# Patient Record
Sex: MALE | Race: WHITE | Employment: UNEMPLOYED | URBAN - METROPOLITAN AREA
[De-identification: names, ages, dates, MRNs, and addresses within clinical notes are randomized per-mention and may not be internally consistent; named-entity substitution may affect disease eponyms.]

---

## 2018-03-20 ENCOUNTER — OFFICE VISIT (OUTPATIENT)
Dept: INTERNAL MEDICINE CLINIC | Age: 63
End: 2018-03-20

## 2018-03-20 VITALS
WEIGHT: 146 LBS | HEART RATE: 68 BPM | RESPIRATION RATE: 19 BRPM | SYSTOLIC BLOOD PRESSURE: 107 MMHG | TEMPERATURE: 97.7 F | HEIGHT: 66 IN | BODY MASS INDEX: 23.46 KG/M2 | DIASTOLIC BLOOD PRESSURE: 63 MMHG | OXYGEN SATURATION: 95 %

## 2018-03-20 DIAGNOSIS — H91.93 HEARING PROBLEM OF BOTH EARS: ICD-10-CM

## 2018-03-20 DIAGNOSIS — F71 MODERATE MENTAL RETARDATION: Primary | ICD-10-CM

## 2018-03-20 DIAGNOSIS — Z76.89 ENCOUNTER TO ESTABLISH CARE WITH NEW DOCTOR: ICD-10-CM

## 2018-03-20 DIAGNOSIS — E78.5 HYPERLIPIDEMIA, UNSPECIFIED HYPERLIPIDEMIA TYPE: ICD-10-CM

## 2018-03-20 DIAGNOSIS — F81.89 DEVELOPMENTAL NON-VERBAL DISORDER: ICD-10-CM

## 2018-03-20 RX ORDER — ACETAMINOPHEN 500 MG
500 TABLET ORAL
COMMUNITY
End: 2018-09-26 | Stop reason: SDUPTHER

## 2018-03-20 RX ORDER — ALBUTEROL SULFATE 90 UG/1
2 AEROSOL, METERED RESPIRATORY (INHALATION)
COMMUNITY

## 2018-03-20 RX ORDER — AZELASTINE HYDROCHLORIDE 0.5 MG/ML
1 SOLUTION/ DROPS OPHTHALMIC 2 TIMES DAILY
COMMUNITY

## 2018-03-20 RX ORDER — FLUTICASONE PROPIONATE 110 UG/1
2 AEROSOL, METERED RESPIRATORY (INHALATION) EVERY 12 HOURS
COMMUNITY

## 2018-03-20 RX ORDER — SODIUM FLUORIDE 5 MG/G
PASTE, DENTIFRICE DENTAL
COMMUNITY
End: 2018-09-20 | Stop reason: SDUPTHER

## 2018-03-20 RX ORDER — CETIRIZINE HCL 10 MG
10 TABLET ORAL DAILY
COMMUNITY

## 2018-03-20 RX ORDER — LOPERAMIDE HYDROCHLORIDE 2 MG/1
2 CAPSULE ORAL
COMMUNITY
End: 2019-10-25 | Stop reason: SDUPTHER

## 2018-03-20 RX ORDER — BUPROPION HYDROCHLORIDE 300 MG/1
300 TABLET ORAL
COMMUNITY
End: 2018-08-27 | Stop reason: SDUPTHER

## 2018-03-20 RX ORDER — HYDROCORTISONE 25 MG/G
CREAM TOPICAL 2 TIMES DAILY
COMMUNITY
End: 2019-06-05 | Stop reason: SDUPTHER

## 2018-03-20 RX ORDER — OLANZAPINE 10 MG/1
10 TABLET ORAL
COMMUNITY
End: 2018-08-27 | Stop reason: SDUPTHER

## 2018-03-20 RX ORDER — FLUTICASONE PROPIONATE 50 MCG
2 SPRAY, SUSPENSION (ML) NASAL DAILY
COMMUNITY

## 2018-03-20 RX ORDER — MONTELUKAST SODIUM 10 MG/1
10 TABLET ORAL
COMMUNITY

## 2018-03-20 RX ORDER — ALBUTEROL SULFATE 2.5 MG/.5ML
2.5 SOLUTION RESPIRATORY (INHALATION)
COMMUNITY
End: 2019-10-25 | Stop reason: ALTCHOICE

## 2018-03-20 NOTE — MR AVS SNAPSHOT
[address] [place] [address] [address] [place] [address] 
[phone] Patient: Ashely Shetty MRN: [id number] [id number] Visit Information Date & Time Provider Department Dept. Phone Encounter #  
 3/20/2018  2:30 PM Conchis Gil MD [facility] Internal Medicine [phone] [id number] Follow-up Instructions Return for medicare and fasting blood work. Levorn Harper Upcoming Health Maintenance Date Due Hepatitis C Screening 1955 DTaP/Tdap/Td series (1 - Tdap) 2/14/1976 FOBT Q 1 YEAR AGE 50-75 2/14/2005 ZOSTER VACCINE AGE 60> 12/14/2014 Influenza Age 5 to Adult 8/1/2017 MEDICARE YEARLY EXAM 3/14/2018 Allergies as of 3/20/2018  Review Complete On: 3/20/2018 By: Conchis Gil MD  
 No Known Allergies Current Immunizations  Reviewed on 3/20/2018 No immunizations on file. Reviewed by Conchis Gil MD on 3/20/2018 at  3:04 PM  
You Were Diagnosed With   
  
 Codes Comments Moderate mental retardation    -  Primary ICD-10-CM: F71 
ICD-9-CM: 318.0 Developmental non-verbal disorder     ICD-10-CM: F81.89 ICD-9-CM: 315.2 Hyperlipidemia, unspecified hyperlipidemia type     ICD-10-CM: E78.5 ICD-9-CM: 272.4 Hearing problem of both ears     ICD-10-CM: H91.93 
ICD-9-CM: V41.2 Vitals BP Pulse Temp Resp Height(growth percentile) 107/63 (BP 1 Location: Left arm, BP Patient Position: Sitting) 68 97.7 °F (36.5 °C) (Temporal) 19 5' 6\" (1.676 m) Weight(growth percentile) SpO2 BMI Smoking Status 146 lb (66.2 kg) 95% 23.57 kg/m2 Never Smoker Vitals History BMI and BSA Data Body Mass Index Body Surface Area  
 23.57 kg/m 2 1.76 m 2 Preferred Pharmacy Pharmacy Name Phone [facility], [address] [phone] Your Updated Medication List  
  
   
This list is accurate as of 3/20/18  3:05 PM.  Always use your most recent med list.  
 acetaminophen 500 mg tablet Commonly known as:  TYLENOL Take 500 mg by mouth every six (6) hours as needed for Pain. Anti-Diarrheal (Loperamide) 2 mg capsule Generic drug:  loperamide Take 2 mg by mouth two (2) times daily as needed for Diarrhea. atorvastatin 10 mg tablet Commonly known as:  LIPITOR Take 10 mg by mouth daily (after dinner). azelastine 0.05 % ophthalmic solution Commonly known as:  OPTIVAR Administer 1 Drop to both eyes two (2) times a day. Use in affected eye(s) buPROPion  mg XL tablet Commonly known as:  Tod Mutters Take 300 mg by mouth every morning. DENTA 5000 PLUS 1.1 % Crea Generic drug:  fluoride (sodium) by Dental route every morning. finasteride 5 mg tablet Commonly known as:  PROSCAR Take 5 mg by mouth daily. FISH OIL 1,000 mg Cap Generic drug:  omega-3 fatty acids-vitamin e Take 1 Cap by mouth three (3) times daily. FLUoxetine 20 mg capsule Commonly known as:  PROzac Take 20 mg by mouth daily. * fluticasone 50 mcg/actuation nasal spray Commonly known as:  Robinson Lover 2 Sprays by Both Nostrils route daily. * FLOVENT  mcg/actuation inhaler Generic drug:  fluticasone Take 2 Puffs by inhalation every twelve (12) hours. hydrocortisone 2.5 % topical cream  
Commonly known as:  HYTONE Apply  to affected area two (2) times a day. use thin layer NEUTROGENA SENSITIVE SKN MOIST lotion Generic drug:  glycerin-dimethicone Apply  to affected area daily. * OLANZapine 10 mg tablet Commonly known as:  ZyPREXA Take 10 mg by mouth nightly. * ZyPREXA 10 mg tablet Generic drug:  OLANZapine Take 10 mg by mouth nightly. omeprazole 20 mg capsule Commonly known as:  PRILOSEC Take 20 mg by mouth daily. SINGULAIR 10 mg tablet Generic drug:  montelukast  
Take 10 mg by mouth nightly. THERA tablet Generic drug:  therapeutic multivitamin Take 1 Tab by mouth daily. * VENTOLIN HFA 90 mcg/actuation inhaler Generic drug:  albuterol Take 2 Puffs by inhalation every four (4) hours as needed for Wheezing. * albuterol sulfate 2.5 mg/0.5 mL Nebu nebulizer solution Commonly known as:  PROVENTIL;VENTOLIN  
2.5 mg by Nebulization route every four (4) hours as needed for Wheezing. ZyrTEC 10 mg tablet Generic drug:  cetirizine Take 10 mg by mouth daily. * Notice: This list has 6 medication(s) that are the same as other medications prescribed for you. Read the directions carefully, and ask your doctor or other care provider to review them with you. Follow-up Instructions Return for medicare and fasting blood work. Leonardo Orozco To-Do List   
 03/27/2018 12:15 PM  
  Appointment with [facility] MRI 2 at [facility] ([phone]) 1. Please bring a list or a bag of your current medications to your appointment 2. Please be sure to remove ALL hair clips, pins, extensions, etc., prior to arriving for your MRI procedure. 3. If you have any medical implants or devices, please bring associated medical card with you. 4. Bring any non [facility] films or CDs pertaining to the area being imaged with you on the day of appointment. 5. A written order with a valid diagnosis and Physicians  signature is required for all scheduled tests. 6. Check in at registration 30min before your appointment time unless you were instructed to do otherwise. Introducing [facility]! Kylie Simpson introduces [employer] patient portal. Now you can access parts of your medical record, email your doctor's office, and request medication refills online. 1. In your internet browser, go to [email or web address] 2. Click on the First Time User? Click Here link in the Sign In box. You will see the New Member Sign Up page. 3. Enter your [employer] Access Code exactly as it appears below. You will not need to use this code after youve completed the sign-up process. If you do not sign up before the expiration date, you must request a new code. · [employer] Access Code: [email or web address] Expires: 5/30/2018  1:50 PM 
 
4. Enter the last four digits of your Social Security Number (xxxx) and Date of Birth (mm/dd/yyyy) as indicated and click Submit. You will be taken to the next sign-up page. 5. Create a [employer] ID. This will be your [employer] login ID and cannot be changed, so think of one that is secure and easy to remember. 6. Create a [employer] password. You can change your password at any time. 7. Enter your Password Reset Question and Answer. This can be used at a later time if you forget your password. 8. Enter your e-mail address. You will receive e-mail notification when new information is available in [address]. 9. Click Sign Up. You can now view and download portions of your medical record. 10. Click the Download Summary menu link to download a portable copy of your medical information. If you have questions, please visit the Frequently Asked Questions section of the [employer] website. Remember, [employer] is NOT to be used for urgent needs. For medical emergencies, dial 911. Now available from your iPhone and Android! Please provide this summary of care documentation to your next provider. Your primary care clinician is listed as EDUIN Laguerre . If you have any questions after today's visit, please call [phone].

## 2018-03-20 NOTE — PROGRESS NOTES
Subjective:     Chief Complaint   Patient presents with   [facility].        He  is a 61y.o. year old male who presents with his group home staff member as a new patient to establish care. His past medical history is significant for moderate mental retardation, non verbal, HLD. 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 He has an appointment for MRI for his sudden loss of hearing. All history is gathered from the caregiver from group [place]. No other concerns toady. Doing well. .     Review of systems not obtained due to patient factors. Objective:     Vitals:    03/20/18 1436   BP: 107/63   Pulse: 68   Resp: 19   Temp: 97.7 °F (36.5 °C)   TempSrc: Temporal   SpO2: 95%   Weight: 146 lb (66.2 kg)   Height: 5' 6\" (1.676 m)       Physical Examination: General appearance - alert, well appearing, and in no distress, normal weight. Mental status - alert,  normal mood, behavior,  dress, motor activity, non verbal.  Patient was following my verbal direction.  Does not seems like he has any hearing difficulty during exam.  Ears - bilateral TM's and external ear canals normal  Nose - normal and patent, no erythema, discharge or polyps  Mouth - mucous membranes moist, pharynx normal without lesions  Neck - supple, no significant adenopathy  Chest - clear to auscultation, no wheezes, rales or rhonchi, symmetric air entry  Heart - normal rate, regular rhythm, normal S1, S2, no murmurs, rubs, clicks or gallops  Abdomen - soft,  nondistended, no masses or organomegaly  Extremities - peripheral pulses normal, no pedal edema, no clubbing or cyanosis    No Known Allergies   Social History     Social History    Marital status: UNKNOWN     Spouse name: N/A    Number of children: N/A    Years of education: N/A     Social History Main Topics    Smoking status: Never Smoker    Smokeless tobacco: Never Used    Alcohol use No    Drug use: No    Sexual activity: Not Asked     Other Topics Concern    None     Social History Narrative      No family history on file. Past Surgical History:   Procedure Laterality Date    COLONOSCOPY,DIAGNOSTIC  11/25/2014         [place] EGD TRANSORAL BIOPSY SINGLE/MULTIPLE  8/1/2013           Past Medical History:   Diagnosis Date    Other ill-defined conditions(799.89)     nonverbal,uses sign language some,mental retardation    Other ill-defined conditions(799.89)     elevated cholesterol    Psychiatric disorder     MR,depression anxiety      Current Outpatient Prescriptions   Medication Sig Dispense Refill    albuterol (VENTOLIN HFA) 90 mcg/actuation inhaler Take 2 Puffs by inhalation every four (4) hours as needed for Wheezing.  hydrocortisone (HYTONE) 2.5 % topical cream Apply  to affected area two (2) times a day. use thin layer      loperamide (ANTI-DIARRHEAL, LOPERAMIDE,) 2 mg capsule Take 2 mg by mouth two (2) times daily as needed for Diarrhea.  acetaminophen (TYLENOL) 500 mg tablet Take 500 mg by mouth every six (6) hours as needed for Pain.  albuterol sulfate (PROVENTIL;VENTOLIN) 2.5 mg/0.5 mL nebu nebulizer solution 2.5 mg by Nebulization route every four (4) hours as needed for Wheezing.  buPROPion XL (WELLBUTRIN XL) 300 mg XL tablet Take 300 mg by mouth every morning.  cetirizine (ZYRTEC) 10 mg tablet Take 10 mg by mouth daily.  fluticasone (FLONASE) 50 mcg/actuation nasal spray 2 Sprays by Both Nostrils route daily.  glycerin-dimethicone (NEUTROGENA SENSITIVE SKN MOIST) lotion Apply  to affected area daily.  azelastine (OPTIVAR) 0.05 % ophthalmic solution Administer 1 Drop to both eyes two (2) times a day. Use in affected eye(s)      fluticasone (FLOVENT HFA) 110 mcg/actuation inhaler Take 2 Puffs by inhalation every twelve (12) hours.  fluoride, sodium, (DENTA 5000 PLUS) 1.1 % crea by Dental route every morning.  OLANZapine (ZYPREXA) 10 mg tablet Take 10 mg by mouth nightly.       montelukast (SINGULAIR) 10 mg tablet Take 10 mg by mouth nightly.  omeprazole (PRILOSEC) 20 mg capsule Take 20 mg by mouth daily.  finasteride (PROSCAR) 5 mg tablet Take 5 mg by mouth daily.  FLUoxetine (PROZAC) 20 mg capsule Take 20 mg by mouth daily.  therapeutic multivitamin (THERA) tablet Take 1 Tab by mouth daily.  atorvastatin (LIPITOR) 10 mg tablet Take 10 mg by mouth daily (after dinner).  omega-3 fatty acids-vitamin e (FISH OIL) 1,000 mg cap Take 1 Cap by mouth three (3) times daily.  OLANZapine (ZYPREXA) 10 mg tablet Take 10 mg by mouth nightly. Assessment/ Plan:   Diagnoses and all orders for this visit:    1. Moderate mental retardation      - stable. Continue conservative management. 2. Developmental non-verbal disorder     - stable. 3. Hyperlipidemia, unspecified hyperlipidemia type       - continue lipittor 10 mg. Will do lab in next visit. 4. Hearing problem of both ears       - he was following my verbal direction in office today. Continue follow up with ENT. 5. Encounter to establish care with new doctor           Medication risks/benefits/costs/interactions/alternatives discussed with patient. Advised patient to call back or return to office if symptoms worsen/change/persist. If patient cannot reach us or should anything more severe/urgent arise he/she should proceed directly to the nearest emergency department. Discussed expected course/resolution/complications of diagnosis in detail with patient. Patient given a written after visit summary which includes her diagnoses, current medications and vitals. Patient expressed understanding with the diagnosis and plan. Follow-up Disposition:  Return for medicare and fasting blood work. Charisse Hopper

## 2018-03-26 RX ORDER — GLUCOSAM/CHONDRO/HERB 149/HYAL 750-100 MG
1 TABLET ORAL 3 TIMES DAILY
Qty: 90 CAP | Refills: 1 | Status: SHIPPED | OUTPATIENT
Start: 2018-03-26 | End: 2019-10-25 | Stop reason: ALTCHOICE

## 2018-03-27 ENCOUNTER — HOSPITAL ENCOUNTER (OUTPATIENT)
Dept: MRI IMAGING | Age: 63
Discharge: HOME | End: 2018-03-27
Attending: SPECIALIST
Payer: MEDICARE

## 2018-03-27 DIAGNOSIS — H90.3 SENSORINEURAL HEARING LOSS, BILATERAL: ICD-10-CM

## 2018-03-27 PROCEDURE — A9576 INJ PROHANCE MULTIPACK: HCPCS | Performed by: SPECIALIST

## 2018-03-27 PROCEDURE — 74011250636 HC RX REV CODE- 250/636: Performed by: SPECIALIST

## 2018-03-27 PROCEDURE — 70553 MRI BRAIN STEM W/O & W/DYE: CPT

## 2018-03-27 RX ADMIN — GADOTERIDOL 15 ML: 279.3 INJECTION, SOLUTION INTRAVENOUS at 13:19

## 2018-03-30 ENCOUNTER — LAB ONLY (OUTPATIENT)
Dept: INTERNAL MEDICINE CLINIC | Age: 63
End: 2018-03-30

## 2018-05-11 ENCOUNTER — OFFICE VISIT (OUTPATIENT)
Dept: INTERNAL MEDICINE CLINIC | Age: 63
End: 2018-05-11

## 2018-05-17 ENCOUNTER — HOSPITAL ENCOUNTER (OUTPATIENT)
Dept: CT IMAGING | Age: 63
Discharge: HOME | End: 2018-05-17
Attending: SPECIALIST
Payer: MEDICARE

## 2018-05-17 DIAGNOSIS — D33.3 BENIGN NEOPLASM OF CRANIAL NERVES (HCC): ICD-10-CM

## 2018-05-17 DIAGNOSIS — D18.09 HEMANGIOMA OF OTHER SITES: ICD-10-CM

## 2018-05-17 DIAGNOSIS — H90.3 SENSORINEURAL HEARING LOSS, BILATERAL: ICD-10-CM

## 2018-05-17 PROCEDURE — 74011636320 HC RX REV CODE- 636/320: Performed by: SPECIALIST

## 2018-05-17 PROCEDURE — 70481 CT ORBIT/EAR/FOSSA W/DYE: CPT

## 2018-05-17 PROCEDURE — 74011000258 HC RX REV CODE- 258: Performed by: SPECIALIST

## 2018-05-17 RX ORDER — SODIUM CHLORIDE 0.9 % (FLUSH) 0.9 %
10 SYRINGE (ML) INJECTION
Status: COMPLETED | OUTPATIENT
Start: 2018-05-17 | End: 2018-05-17

## 2018-05-17 RX ADMIN — IOPAMIDOL 100 ML: 612 INJECTION, SOLUTION INTRAVENOUS at 10:00

## 2018-05-17 RX ADMIN — Medication 10 ML: at 10:00

## 2018-05-17 RX ADMIN — SODIUM CHLORIDE 100 ML: 900 INJECTION, SOLUTION INTRAVENOUS at 10:00

## 2018-05-24 ENCOUNTER — OFFICE VISIT (OUTPATIENT)
Dept: INTERNAL MEDICINE CLINIC | Age: 63
End: 2018-05-24

## 2018-05-24 ENCOUNTER — HOSPITAL ENCOUNTER (OUTPATIENT)
Dept: LAB | Age: 63
Discharge: HOME | End: 2018-05-24
Payer: MEDICARE

## 2018-05-24 VITALS
HEIGHT: 66 IN | BODY MASS INDEX: 23.27 KG/M2 | RESPIRATION RATE: 18 BRPM | SYSTOLIC BLOOD PRESSURE: 135 MMHG | TEMPERATURE: 96.2 F | HEART RATE: 70 BPM | OXYGEN SATURATION: 99 % | WEIGHT: 144.8 LBS | DIASTOLIC BLOOD PRESSURE: 70 MMHG

## 2018-05-24 DIAGNOSIS — Z11.1 SCREENING-PULMONARY TB: ICD-10-CM

## 2018-05-24 DIAGNOSIS — E78.5 HYPERLIPIDEMIA, UNSPECIFIED HYPERLIPIDEMIA TYPE: ICD-10-CM

## 2018-05-24 DIAGNOSIS — F71 MODERATE MENTAL RETARDATION: ICD-10-CM

## 2018-05-24 DIAGNOSIS — Z12.11 SCREEN FOR COLON CANCER: ICD-10-CM

## 2018-05-24 DIAGNOSIS — F81.89 DEVELOPMENTAL NON-VERBAL DISORDER: ICD-10-CM

## 2018-05-24 DIAGNOSIS — Z12.5 SPECIAL SCREENING FOR MALIGNANT NEOPLASM OF PROSTATE: ICD-10-CM

## 2018-05-24 DIAGNOSIS — Z00.00 MEDICARE ANNUAL WELLNESS VISIT, SUBSEQUENT: Primary | ICD-10-CM

## 2018-05-24 PROCEDURE — 86480 TB TEST CELL IMMUN MEASURE: CPT

## 2018-05-24 PROCEDURE — 80053 COMPREHEN METABOLIC PANEL: CPT

## 2018-05-24 PROCEDURE — 86803 HEPATITIS C AB TEST: CPT

## 2018-05-24 PROCEDURE — 80061 LIPID PANEL: CPT

## 2018-05-24 PROCEDURE — 85025 COMPLETE CBC W/AUTO DIFF WBC: CPT

## 2018-05-24 PROCEDURE — 84443 ASSAY THYROID STIM HORMONE: CPT

## 2018-05-24 PROCEDURE — 83036 HEMOGLOBIN GLYCOSYLATED A1C: CPT

## 2018-05-24 NOTE — MR AVS SNAPSHOT
[address] [place] [address] Shea Mena 26 350 [facility] 
[phone] Patient: Raymond Fong MRN: [id number] KXP:[id number] Visit Information Date & Time Provider Department Dept. Phone Encounter #  
 5/24/2018 10:45 AM Duke Chairez MD [facility] Internal Medicine [phone] [id number] Follow-up Instructions Return in about 6 months (around 11/24/2018), or if symptoms worsen or fail to improve. Your Appointments 8/27/2018 10:00 AM  
New Patient with Louann Ritchie MD  
[facility] [address] [facility]) Appt Note: NP moderate mental retardation [facility] [address] [facility]ly [facility] [address]  
  
   
 [address] [place] [address] [address] [phone] Upcoming Health Maintenance Date Due Hepatitis C Screening 1955 DTaP/Tdap/Td series (1 - Tdap) 2/14/1976 FOBT Q 1 YEAR AGE 50-75 2/14/2005 ZOSTER VACCINE AGE 60> 12/14/2014 MEDICARE YEARLY EXAM 3/14/2018 Influenza Age 5 to Adult 8/1/2018 Allergies as of 5/24/2018  Review Complete On: 5/24/2018 By: Anupama Dillon MD  
 No Known Allergies Current Immunizations  Reviewed on 5/24/2018 No immunizations on file. Reviewed by Anupama Dillon MD on 5/24/2018 at 11:08 AM  
 Reviewed by Anupama Dillon MD on 5/24/2018 at 11:16 AM  
You Were Diagnosed With   
  
 Codes Comments Medicare annual wellness visit, subsequent    -  Primary ICD-10-CM: Z00.00 ICD-9-CM: V70.0 Moderate mental retardation     ICD-10-CM: F71 
ICD-9-CM: 318.0 Hyperlipidemia, unspecified hyperlipidemia type     ICD-10-CM: E78.5 ICD-9-CM: 272.4 Special screening for malignant neoplasm of prostate     ICD-10-CM: Z12.5 ICD-9-CM: V76.44 Developmental non-verbal disorder     ICD-10-CM: F81.89 ICD-9-CM: 315.2 Screening-pulmonary TB     ICD-10-CM: Z11.1 ICD-9-CM: V74.1 Screen for colon cancer     ICD-10-CM: Z12.11 ICD-9-CM: V76.51   
 Vitals BP Pulse Temp Resp Height(growth percentile) 135/70 (BP 1 Location: Left arm, BP Patient Position: Sitting) 70 96.2 °F (35.7 °C) (Temporal) 18 5' 6\" (1.676 m) Weight(growth percentile) SpO2 BMI Smoking Status 144 lb 12.8 oz (65.7 kg) 99% 23.37 kg/m2 Never Smoker Vitals History BMI and BSA Data Body Mass Index Body Surface Area  
 23.37 kg/m 2 1.75 m 2 Preferred Pharmacy Pharmacy Name Phone [facility], [place] [address] [phone] Your Updated Medication List  
  
   
This list is accurate as of 5/24/18 11:17 AM.  Always use your most recent med list.  
  
  
  
  
 acetaminophen 500 mg tablet Commonly known as:  TYLENOL Take 500 mg by mouth every six (6) hours as needed for Pain. Anti-Diarrheal (Loperamide) 2 mg capsule Generic drug:  loperamide Take 2 mg by mouth two (2) times daily as needed for Diarrhea. atorvastatin 10 mg tablet Commonly known as:  LIPITOR  
TAKE 1 TABLET BY MOUTH ONCE DAILY IN THE EVENING  
  
 azelastine 0.05 % ophthalmic solution Commonly known as:  OPTIVAR Administer 1 Drop to both eyes two (2) times a day. Use in affected eye(s) buPROPion  mg XL tablet Commonly known as:  Gavino Mon Take 300 mg by mouth every morning. DENTA 5000 PLUS 1.1 % Crea Generic drug:  fluoride (sodium) by Dental route every morning. finasteride 5 mg tablet Commonly known as:  PROSCAR  
TAKE 1 TABLET DAILY *SPECIAL HANDLING OF TABLETS* FISH OIL 1,000 mg Cap Generic drug:  omega-3 fatty acids-vitamin e Take 1 Cap by mouth three (3) times daily. FISH -1,000 mg capsule Generic drug:  fish oil-omega-3 fatty acids TAKE (1) CAPSULE BY MOUTH THREE TIMES DAILY. FLUoxetine 20 mg capsule Commonly known as:  PROzac Take 20 mg by mouth daily. * fluticasone 50 mcg/actuation nasal spray Commonly known as:  Nain Vargas 2 Sprays by Both Nostrils route daily. * FLOVENT  mcg/actuation inhaler Generic drug:  fluticasone Take 2 Puffs by inhalation every twelve (12) hours. hydrocortisone 2.5 % topical cream  
Commonly known as:  HYTONE Apply  to affected area two (2) times a day. use thin layer NEUTROGENA SENSITIVE SKN MOIST lotion Generic drug:  glycerin-dimethicone Apply  to affected area daily. * OLANZapine 10 mg tablet Commonly known as:  ZyPREXA Take 10 mg by mouth nightly. * ZyPREXA 10 mg tablet Generic drug:  OLANZapine Take 10 mg by mouth nightly. omega 3-DHA-EPA-fish oil 1,000 mg (120 mg-180 mg) capsule Commonly known as:  FISH OIL Take 1 Cap by mouth three (3) times daily. omeprazole 20 mg capsule Commonly known as:  PRILOSEC  
TAKE (1) CAPSULE DAILY 30-60 MINUTES BEFORE BREAKFAST. SINGULAIR 10 mg tablet Generic drug:  montelukast  
Take 10 mg by mouth nightly. therapeutic multivitamin tablet Commonly known as:  THERAGRAN  
TAKE 1 TABLET BY MOUTH DAILY * VENTOLIN HFA 90 mcg/actuation inhaler Generic drug:  albuterol Take 2 Puffs by inhalation every four (4) hours as needed for Wheezing. * albuterol sulfate 2.5 mg/0.5 mL Nebu nebulizer solution Commonly known as:  PROVENTIL;VENTOLIN  
2.5 mg by Nebulization route every four (4) hours as needed for Wheezing. ZyrTEC 10 mg tablet Generic drug:  cetirizine Take 10 mg by mouth daily. * Notice: This list has 6 medication(s) that are the same as other medications prescribed for you. Read the directions carefully, and ask your doctor or other care provider to review them with you. We Performed the Following CBC WITH AUTOMATED DIFF [90135 CPT(R)] COLOGUARD TEST (FECAL DNA COLORECTAL CANCER SCREENING) [56458 CPT(R)] HEMOGLOBIN A1C WITH EAG [37514 CPT(R)] HEPATITIS C AB [63506 CPT(R)] LIPID PANEL [35304 CPT(R)] METABOLIC PANEL, COMPREHENSIVE [55701 CPT(R)] QUANTIFERON TB GOLD [[id number] Custom] TSH AND FREE T4 [34851 CPT(R)] Follow-up Instructions Return in about 6 months (around 11/24/2018), or if symptoms worsen or fail to improve. To-Do List   
 05/24/2018 Lab:  PSA SCREENING (SCREENING) Patient Instructions Medicare Wellness Visit, Male The best way to live healthy is to have a lifestyle where you eat a well-balanced diet, exercise regularly, limit alcohol use, and quit all forms of tobacco/nicotine, if applicable. Regular preventive services are another way to keep healthy. Preventive services (vaccines, screening tests, monitoring & exams) can help personalize your care plan, which helps you manage your own care. Screening tests can find health problems at the earliest stages, when they are easiest to treat. [address] follows the current, evidence-based guidelines published by the [facility] Russ Prince ([facility]) when recommending preventive services for our patients. Because we follow these guidelines, sometimes recommendations change over time as research supports it. (For example, a prostate screening blood test is no longer routinely recommended for men with no symptoms.) Of course, you and your provider may decide to screen more often for some diseases, based on your risk and co-morbidities (chronic disease you are already diagnosed with). Preventive services for you include: - Medicare offers their members a free annual wellness visit, which is time for you and your primary care provider to discuss and plan for your preventive service needs. Take advantage of this benefit every year! 
 
-All people over age 72 should receive the recommended pneumonia vaccines. Current USPSTF guidelines recommend a series of two vaccines for the best pneumonia protection. -All adults should have a yearly flu vaccine and a tetanus vaccine every 10 years. All adults age 61 years should receive a shingles vaccine once in their lifetime.   
 
-All adults age 38-68 years who are overweight should have a diabetes screening test once every three years.  
 
-Other screening tests & preventive services for persons with diabetes include: an eye exam to screen for diabetic retinopathy, a kidney function test, a foot exam, and stricter control over your cholesterol.  
 
-Cardiovascular screening for adults with routine risk involves an electrocardiogram (ECG) at intervals determined by the provider.  
 
-Colorectal cancer screenings should be done for adults age 54-65 years with normal risk. There are a number of acceptable methods of screening for this type of cancer. Each test has its own benefits and drawbacks. Discuss with your provider what is most appropriate for you during your annual wellness visit. The different tests include: colonoscopy (considered the best screening method), a fecal occult blood test, a fecal DNA test, and sigmoidoscopy. 
 
-All adults born between [facility] should be screened once for Hepatitis C. 
 
-An Abdominal Aortic Aneurysm (AAA) Screening is recommended for men age 73-68 who has ever smoked in their lifetime. Here is a list of your current Health Maintenance items (your personalized list of preventive services) with a due date: 
Health Maintenance Due Topic Date Due  
 Hepatitis C Test  1955  DTaP/Tdap/Td  (1 - Tdap) 02/14/1976  Stool testing for trace blood  02/14/2005  Shingles Vaccine  12/14/2014 [facility] Annual Well Visit  03/14/2018 Well Visit, Men 48 to 72: Care Instructions Your Care Instructions Physical exams can help you stay healthy. Your doctor has checked your overall health and may have suggested ways to take good care of yourself. He or she also may have recommended tests.  At home, you can help prevent illness with healthy eating, regular exercise, and other steps. Follow-up care is a key part of your treatment and safety. Be sure to make and go to all appointments, and call your doctor if you are having problems. It's also a good idea to know your test results and keep a list of the medicines you take. How can you care for yourself at home? · Reach and stay at a healthy weight. This will lower your risk for many problems, such as obesity, diabetes, heart disease, and high blood pressure. · Get at least 30 minutes of exercise on most days of the week. Walking is a good choice. You also may want to do other activities, such as running, swimming, cycling, or playing tennis or team sports. · Do not smoke. Smoking can make health problems worse. If you need help quitting, talk to your doctor about stop-smoking programs and medicines. These can increase your chances of quitting for good. · Protect your skin from too much sun. When you're outdoors from 10 a.m. to 4 p.m., stay in the shade or cover up with clothing and a hat with a wide brim. Wear sunglasses that block UV rays. Even when it's cloudy, put broad-spectrum sunscreen (SPF 30 or higher) on any exposed skin. · See a dentist one or two times a year for checkups and to have your teeth cleaned. · Wear a seat belt in the car. · Limit alcohol to 2 drinks a day. Too much alcohol can cause health problems. Follow your doctor's advice about when to have certain tests. These tests can spot problems early. · Cholesterol. Your doctor will tell you how often to have this done based on your overall health and other things that can increase your risk for heart attack and stroke. · Blood pressure. Have your blood pressure checked during a routine doctor visit. Your doctor will tell you how often to check your blood pressure based on your age, your blood pressure results, and other factors.  
· Prostate exam. Talk to your doctor about whether you should have a blood test (called a PSA test) for prostate cancer. Experts disagree on whether men should have this test. Some experts recommend that you discuss the benefits and risks of the test with your doctor. · Diabetes. Ask your doctor whether you should have tests for diabetes. · Vision. Some experts recommend that you have yearly exams for glaucoma and other age-related eye problems starting at age 48. · Hearing. Tell your doctor if you notice any change in your hearing. You can have tests to find out how well you hear. · Colon cancer. You should begin tests for colon cancer at age 48. You may have one of several tests. Your doctor will tell you how often to have tests based on your age and risk. Risks include whether you already had a precancerous polyp removed from your colon or whether your parent, brother, sister, or child has had colon cancer. · Heart attack and stroke risk. At least every 4 to 6 years, you should have your risk for heart attack and stroke assessed. Your doctor uses factors such as your age, blood pressure, cholesterol, and whether you smoke or have diabetes to show what your risk for a heart attack or stroke is over the next 10 years. · Abdominal aortic aneurysm. Ask your doctor whether you should have a test to check for an aneurysm. You may need a test if you ever smoked or if your parent, brother, sister, or child has had an aneurysm. When should you call for help? Watch closely for changes in your health, and be sure to contact your doctor if you have any problems or symptoms that concern you. Where can you learn more? Go to http://niyah-corine.info/. Enter V786 in the search box to learn more about \"Well Visit, Men 48 to 72: Care Instructions. \" Current as of: May 12, 2017 Content Version: 11.4 © 2549-3590 [employer].  Care instructions adapted under license by [employer] (which disclaims liability or warranty for this information). If you have questions about a medical condition or this instruction, always ask your healthcare professional. [address] any warranty or liability for your use of this information. Introducing [facility] SERVICES! [employer] introduces [employer] patient portal. Now you can access parts of your medical record, email your doctor's office, and request medication refills online. 1. In your internet browser, go to [email or web address] 2. Click on the First Time User? Click Here link in the Sign In box. You will see the New Member Sign Up page. 3. Enter your [employer] Access Code exactly as it appears below. You will not need to use this code after youve completed the sign-up process. If you do not sign up before the expiration date, you must request a new code. · [employer] Access Code: [email or web address] Expires: 5/30/2018  1:50 PM 
 
4. Enter the last four digits of your Social Security Number (xxxx) and Date of Birth (mm/dd/yyyy) as indicated and click Submit. You will be taken to the next sign-up page. 5. Create a [employer] ID. This will be your [employer] login ID and cannot be changed, so think of one that is secure and easy to remember. 6. Create a [employer] password. You can change your password at any time. 7. Enter your Password Reset Question and Answer. This can be used at a later time if you forget your password. 8. Enter your e-mail address. You will receive e-mail notification when new information is available in [address] 9. Click Sign Up. You can now view and download portions of your medical record. 10. Click the Download Summary menu link to download a portable copy of your medical information. If you have questions, please visit the Frequently Asked Questions section of the [employer] website. Remember, [employer] is NOT to be used for urgent needs. For medical emergencies, dial 911. Now available from your iPhone and Android! Please provide this summary of care documentation to your next provider. Your primary care clinician is listed as Duke QIU Asad. If you have any questions after today's visit, please call [phone].

## 2018-05-24 NOTE — PATIENT INSTRUCTIONS
Medicare Wellness Visit, Male    The best way to live healthy is to have a lifestyle where you eat a well-balanced diet, exercise regularly, limit alcohol use, and quit all forms of tobacco/nicotine, if applicable. Regular preventive services are another way to keep healthy. Preventive services (vaccines, screening tests, monitoring & exams) can help personalize your care plan, which helps you manage your own care. Screening tests can find health problems at the earliest stages, when they are easiest to treat. [address] follows the current, evidence-based guidelines published by the [facility] Russ Prince ([facility]) when recommending preventive services for our patients. Because we follow these guidelines, sometimes recommendations change over time as research supports it. (For example, a prostate screening blood test is no longer routinely recommended for men with no symptoms.)    Of course, you and your provider may decide to screen more often for some diseases, based on your risk and co-morbidities (chronic disease you are already diagnosed with). Preventive services for you include:    - Medicare offers their members a free annual wellness visit, which is time for you and your primary care provider to discuss and plan for your preventive service needs. Take advantage of this benefit every year!    -All people over age 72 should receive the recommended pneumonia vaccines. Current USPSTF guidelines recommend a series of two vaccines for the best pneumonia protection.     -All adults should have a yearly flu vaccine and a tetanus vaccine every 10 years.  All adults age 61 years should receive a shingles vaccine once in their lifetime.      -All adults age 38-68 years who are overweight should have a diabetes screening test once every three years.     -Other screening tests & preventive services for persons with diabetes include: an eye exam to screen for diabetic retinopathy, a kidney function test, a foot exam, and stricter control over your cholesterol.     -Cardiovascular screening for adults with routine risk involves an electrocardiogram (ECG) at intervals determined by the provider.     -Colorectal cancer screenings should be done for adults age 54-65 years with normal risk. There are a number of acceptable methods of screening for this type of cancer. Each test has its own benefits and drawbacks. Discuss with your provider what is most appropriate for you during your annual wellness visit. The different tests include: colonoscopy (considered the best screening method), a fecal occult blood test, a fecal DNA test, and sigmoidoscopy.    -All adults born between [facility] should be screened once for Hepatitis C.    -An Abdominal Aortic Aneurysm (AAA) Screening is recommended for men age 73-68 who has ever smoked in their lifetime. Here is a list of your current Health Maintenance items (your personalized list of preventive services) with a due date:  Health Maintenance Due   Topic Date Due    Hepatitis C Test  1955    DTaP/Tdap/Td  (1 - Tdap) 02/14/1976    Stool testing for trace blood  02/14/2005    Shingles Vaccine  12/14/2014    Annual Well Visit  03/14/2018        Well Visit, Men 48 to 72: Care Instructions  Your Care Instructions    Physical exams can help you stay healthy. Your doctor has checked your overall health and may have suggested ways to take good care of yourself. He or she also may have recommended tests. At home, you can help prevent illness with healthy eating, regular exercise, and other steps. Follow-up care is a key part of your treatment and safety. Be sure to make and go to all appointments, and call your doctor if you are having problems. It's also a good idea to know your test results and keep a list of the medicines you take. How can you care for yourself at home? · Reach and stay at a healthy weight.  This will lower your risk for many problems, such as obesity, diabetes, heart disease, and high blood pressure. · Get at least 30 minutes of exercise on most days of the week. Walking is a good choice. You also may want to do other activities, such as running, swimming, cycling, or playing tennis or team sports. · Do not smoke. Smoking can make health problems worse. If you need help quitting, talk to your doctor about stop-smoking programs and medicines. These can increase your chances of quitting for good. · Protect your skin from too much sun. When you're outdoors from 10 a.m. to 4 p.m., stay in the shade or cover up with clothing and a hat with a wide brim. Wear sunglasses that block UV rays. Even when it's cloudy, put broad-spectrum sunscreen (SPF 30 or higher) on any exposed skin. · See a dentist one or two times a year for checkups and to have your teeth cleaned. · Wear a seat belt in the car. · Limit alcohol to 2 drinks a day. Too much alcohol can cause health problems. Follow your doctor's advice about when to have certain tests. These tests can spot problems early. · Cholesterol. Your doctor will tell you how often to have this done based on your overall health and other things that can increase your risk for heart attack and stroke. · Blood pressure. Have your blood pressure checked during a routine doctor visit. Your doctor will tell you how often to check your blood pressure based on your age, your blood pressure results, and other factors. · Prostate exam. Talk to your doctor about whether you should have a blood test (called a PSA test) for prostate cancer. Experts disagree on whether men should have this test. Some experts recommend that you discuss the benefits and risks of the test with your doctor. · Diabetes. Ask your doctor whether you should have tests for diabetes. · Vision. Some experts recommend that you have yearly exams for glaucoma and other age-related eye problems starting at age 48. · Hearing. Tell your doctor if you notice any change in your hearing. You can have tests to find out how well you hear. · Colon cancer. You should begin tests for colon cancer at age 48. You may have one of several tests. Your doctor will tell you how often to have tests based on your age and risk. Risks include whether you already had a precancerous polyp removed from your colon or whether your parent, brother, sister, or child has had colon cancer. · Heart attack and stroke risk. At least every 4 to 6 years, you should have your risk for heart attack and stroke assessed. Your doctor uses factors such as your age, blood pressure, cholesterol, and whether you smoke or have diabetes to show what your risk for a heart attack or stroke is over the next 10 years. · Abdominal aortic aneurysm. Ask your doctor whether you should have a test to check for an aneurysm. You may need a test if you ever smoked or if your parent, brother, sister, or child has had an aneurysm. When should you call for help? Watch closely for changes in your health, and be sure to contact your doctor if you have any problems or symptoms that concern you. Where can you learn more? Go to http://niyah-corine.info/. Enter J536 in the search box to learn more about \"Well Visit, Men 48 to 72: Care Instructions. \"  Current as of: May 12, 2017  Content Version: 11.4  © 1737-7688 [employer]. Care instructions adapted under license by [employer] (which disclaims liability or warranty for this information). If you have questions about a medical condition or this instruction, always ask your healthcare professional. [place] [address] any warranty or liability for your use of this information.

## 2018-05-24 NOTE — PROGRESS NOTES
This is the Subsequent Medicare Annual Wellness Exam, performed 12 months or more after the Initial AWV or the last Subsequent AWV    I have reviewed the patient's medical history in detail and updated the computerized patient record. He  is a 61y.o. year old male who presents with his group home staff member . His past medical history is significant for moderate mental retardation, non verbal, HLD, urinary incontinence. He is taking Lipitor 10 mg daily. Has been taking Finasteride. 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 Had MRI done in 3/3018 by ENT specialist. Wanted to know the result in detail. I reviewed the result with caregiver. Dicussed that there are age related change in his brain otherwise normal     MRI:   IMPRESSION   impression: Mild atrophy and periventricular white matter disease. No other  significant findings with special attention to the temporal bones.     All history is gathered from the caregiver from [facility].      No other concerns toady. Doing well. .      Review of systems not obtained due to patient factors.       History     Past Medical History:   Diagnosis Date    Other ill-defined conditions(799.89)     nonverbal,uses sign language some,mental retardation    Other ill-defined conditions(799.89)     elevated cholesterol    Psychiatric disorder     MR,depression anxiety      Past Surgical History:   Procedure Laterality Date    COLONOSCOPY,DIAGNOSTIC  11/25/2014         [place] EGD TRANSORAL BIOPSY SINGLE/MULTIPLE  8/1/2013          Current Outpatient Prescriptions   Medication Sig Dispense Refill    finasteride (PROSCAR) 5 mg tablet TAKE 1 TABLET DAILY *SPECIAL HANDLING OF TABLETS* 31 Tab 2    therapeutic multivitamin (THERAGRAN) tablet TAKE 1 TABLET BY MOUTH DAILY 31 Tab 3    atorvastatin (LIPITOR) 10 mg tablet TAKE 1 TABLET BY MOUTH ONCE DAILY IN THE EVENING 31 Tab 3    omeprazole (PRILOSEC) 20 mg capsule TAKE (1) CAPSULE DAILY 30-60 MINUTES BEFORE BREAKFAST. 31 Cap 3    FISH -1,000 mg capsule TAKE (1) CAPSULE BY MOUTH THREE TIMES DAILY. 93 Cap 1    omega 3-DHA-EPA-fish oil (FISH OIL) 1,000 mg (120 mg-180 mg) capsule Take 1 Cap by mouth three (3) times daily. 90 Cap 1    albuterol (VENTOLIN HFA) 90 mcg/actuation inhaler Take 2 Puffs by inhalation every four (4) hours as needed for Wheezing.  hydrocortisone (HYTONE) 2.5 % topical cream Apply  to affected area two (2) times a day. use thin layer      loperamide (ANTI-DIARRHEAL, LOPERAMIDE,) 2 mg capsule Take 2 mg by mouth two (2) times daily as needed for Diarrhea.  acetaminophen (TYLENOL) 500 mg tablet Take 500 mg by mouth every six (6) hours as needed for Pain.  albuterol sulfate (PROVENTIL;VENTOLIN) 2.5 mg/0.5 mL nebu nebulizer solution 2.5 mg by Nebulization route every four (4) hours as needed for Wheezing.  buPROPion XL (WELLBUTRIN XL) 300 mg XL tablet Take 300 mg by mouth every morning.  cetirizine (ZYRTEC) 10 mg tablet Take 10 mg by mouth daily.  fluticasone (FLONASE) 50 mcg/actuation nasal spray 2 Sprays by Both Nostrils route daily.  glycerin-dimethicone (NEUTROGENA SENSITIVE SKN MOIST) lotion Apply  to affected area daily.  azelastine (OPTIVAR) 0.05 % ophthalmic solution Administer 1 Drop to both eyes two (2) times a day. Use in affected eye(s)      fluticasone (FLOVENT HFA) 110 mcg/actuation inhaler Take 2 Puffs by inhalation every twelve (12) hours.  fluoride, sodium, (DENTA 5000 PLUS) 1.1 % crea by Dental route every morning.  OLANZapine (ZYPREXA) 10 mg tablet Take 10 mg by mouth nightly.  montelukast (SINGULAIR) 10 mg tablet Take 10 mg by mouth nightly.  FLUoxetine (PROZAC) 20 mg capsule Take 20 mg by mouth daily.  omega-3 fatty acids-vitamin e (FISH OIL) 1,000 mg cap Take 1 Cap by mouth three (3) times daily.  OLANZapine (ZYPREXA) 10 mg tablet Take 10 mg by mouth nightly.        No Known Allergies  Family History   Problem Relation Age of Onset    Family history unknown: Yes     Social History   Substance Use Topics    Smoking status: Never Smoker    Smokeless tobacco: Never Used    Alcohol use No     Patient Active Problem List   Diagnosis Code    Moderate mental retardation F71    Developmental non-verbal disorder F81.89    Hyperlipidemia E78.5       Depression Risk Factor Screening:   No flowsheet data found. Alcohol Risk Factor Screening: You do not drink alcohol . Functional Ability and Level of Safety:   Hearing Loss  Pt followed my command mostly. Activities of Daily Living  The home contains: lives in group home. Patient needs help with:  transportation, shopping, preparing meals, laundry, housework, managing medications, hygiene and bathroom needs    Fall Risk  No flowsheet data found. Abuse Screen  Patient is not abused    Cognitive Screening   Evaluation of Cognitive Function:  Has your family/caregiver stated any concerns about your memory: N/A  N/A. Physical Examination: General appearance - alert, well appearing, and in no distress, normal weight. Mental status - alert,  normal mood, behavior,  dress, motor activity, non verbal.  Patient was following my verbal direction.  Does not seems like he has any hearing difficulty during exam.  Ears - bilateral TM's and external ear canals normal  Nose - normal and patent, no erythema, discharge or polyps  Mouth - mucous membranes moist, pharynx normal without lesions  Neck - supple, no significant adenopathy  Chest - clear to auscultation, no wheezes, rales or rhonchi, symmetric air entry  Heart - normal rate, regular rhythm, normal S1, S2, no murmurs, rubs, clicks or gallops  Abdomen - soft,  nondistended, no masses or organomegaly  Extremities - peripheral pulses normal, no pedal edema, no clubbing or cyanosis    Patient Care Team   Patient Care Team:  Sheron Goodell, MD as PCP - General (Family Practice)  Joleen Copeland RN (Psychiatry)    Assessment/Plan   Education and counseling provided:  Are appropriate based on today's review and evaluation  Prostate cancer screening tests (PSA, covered annually)  Colorectal cancer screening tests    Diagnoses and all orders for this visit:    1. Medicare annual wellness visit, subsequent  -     HEPATITIS C AB  -     PSA Screening ([id number]); Future  -     CBC WITH AUTOMATED DIFF  -     LIPID PANEL  -     METABOLIC PANEL, COMPREHENSIVE  -     HEMOGLOBIN A1C WITH EAG  -     TSH AND FREE T4    2. Moderate mental retardation        - stable. Continue follow up with psychiatrist.   3. Hyperlipidemia, unspecified hyperlipidemia type  -    Continue current med. -    LIPID PANEL    4. Special screening for malignant neoplasm of prostate  -     PSA Screening ([id number]); Future    5. Developmental non-verbal disorder    6. Screening-pulmonary TB  -     QUANTIFERON TB GOLD    7.  Screen for colon cancer  -     COLOGUARD TEST (FECAL DNA COLORECTAL CANCER SCREENING)        Health Maintenance Due   Topic Date Due    Hepatitis C Screening  1955    DTaP/Tdap/Td series (1 - Tdap) 02/14/1976    FOBT Q 1 YEAR AGE 50-75  02/14/2005    ZOSTER VACCINE AGE 60>  12/14/2014    MEDICARE YEARLY EXAM  03/14/2018

## 2018-05-29 LAB
ALBUMIN SERPL-MCNC: 4.5 G/DL (ref 3.6–4.8)
ALBUMIN/GLOB SERPL: 1.8 {RATIO} (ref 1.2–2.2)
ALP SERPL-CCNC: 80 IU/L (ref 39–117)
ALT SERPL-CCNC: 15 IU/L (ref 0–44)
ANNOTATION COMMENT IMP: NORMAL
AST SERPL-CCNC: 16 IU/L (ref 0–40)
BASOPHILS # BLD AUTO: 0 X10E3/UL (ref 0–0.2)
BASOPHILS NFR BLD AUTO: 0 %
BILIRUB SERPL-MCNC: 0.4 MG/DL (ref 0–1.2)
BUN SERPL-MCNC: 14 MG/DL (ref 8–27)
BUN/CREAT SERPL: 16 (ref 10–24)
CALCIUM SERPL-MCNC: 9.5 MG/DL (ref 8.6–10.2)
CHLORIDE SERPL-SCNC: 102 MMOL/L (ref 96–106)
CHOLEST SERPL-MCNC: 125 MG/DL (ref 100–199)
CO2 SERPL-SCNC: 29 MMOL/L (ref 18–29)
CREAT SERPL-MCNC: 0.87 MG/DL (ref 0.76–1.27)
EOSINOPHIL # BLD AUTO: 0 X10E3/UL (ref 0–0.4)
EOSINOPHIL NFR BLD AUTO: 1 %
ERYTHROCYTE [DISTWIDTH] IN BLOOD BY AUTOMATED COUNT: 14.2 % (ref 12.3–15.4)
EST. AVERAGE GLUCOSE BLD GHB EST-MCNC: 103 MG/DL
GAMMA INTERFERON BACKGROUND BLD IA-ACNC: 0.04 IU/ML
GFR SERPLBLD CREATININE-BSD FMLA CKD-EPI: 106 ML/MIN/1.73
GFR SERPLBLD CREATININE-BSD FMLA CKD-EPI: 92 ML/MIN/1.73
GLOBULIN SER CALC-MCNC: 2.5 G/DL (ref 1.5–4.5)
GLUCOSE SERPL-MCNC: 101 MG/DL (ref 65–99)
HBA1C MFR BLD: 5.2 % (ref 4.8–5.6)
HCT VFR BLD AUTO: 45 % (ref 37.5–51)
HCV AB S/CO SERPL IA: <0.1 S/CO RATIO (ref 0–0.9)
HDLC SERPL-MCNC: 40 MG/DL
HGB BLD-MCNC: 15.1 G/DL (ref 13–17.7)
IMM GRANULOCYTES # BLD: 0 X10E3/UL (ref 0–0.1)
IMM GRANULOCYTES NFR BLD: 0 %
INTERPRETATION, 910389: NORMAL
LDLC SERPL CALC-MCNC: 66 MG/DL (ref 0–99)
LYMPHOCYTES # BLD AUTO: 1.9 X10E3/UL (ref 0.7–3.1)
LYMPHOCYTES NFR BLD AUTO: 34 %
M TB IFN-G BLD-IMP: NEGATIVE
M TB IFN-G CD4+ BCKGRND COR BLD-ACNC: 0.05 IU/ML
M TB IFN-G CD4+ T-CELLS BLD-ACNC: 0.09 IU/ML
MCH RBC QN AUTO: 31.8 PG (ref 26.6–33)
MCHC RBC AUTO-ENTMCNC: 33.6 G/DL (ref 31.5–35.7)
MCV RBC AUTO: 95 FL (ref 79–97)
MITOGEN IGNF BLD-ACNC: 6.7 IU/ML
MONOCYTES # BLD AUTO: 0.5 X10E3/UL (ref 0.1–0.9)
MONOCYTES NFR BLD AUTO: 8 %
NEUTROPHILS # BLD AUTO: 3.3 X10E3/UL (ref 1.4–7)
NEUTROPHILS NFR BLD AUTO: 57 %
PLATELET # BLD AUTO: 156 X10E3/UL (ref 150–379)
POTASSIUM SERPL-SCNC: 4.4 MMOL/L (ref 3.5–5.2)
PROT SERPL-MCNC: 7 G/DL (ref 6–8.5)
QUANTIFERON INCUBATION: NORMAL
RBC # BLD AUTO: 4.75 X10E6/UL (ref 4.14–5.8)
SERVICE CMNT-IMP: NORMAL
SODIUM SERPL-SCNC: 146 MMOL/L (ref 134–144)
T4 FREE SERPL-MCNC: 1.3 NG/DL (ref 0.82–1.77)
TRIGL SERPL-MCNC: 94 MG/DL (ref 0–149)
TSH SERPL DL<=0.005 MIU/L-ACNC: 1.07 UIU/ML (ref 0.45–4.5)
VLDLC SERPL CALC-MCNC: 19 MG/DL (ref 5–40)
WBC # BLD AUTO: 5.7 X10E3/UL (ref 3.4–10.8)

## 2018-05-29 NOTE — PROGRESS NOTES
Please mail letter:    1. Thyroid, cholesterol, kidney, liver, CBC level is normal.    2. No diabetes. 3. TB test came back negative. 4. Hep C is negative.

## 2018-07-11 RX ORDER — OMEGA-3 FATTY ACIDS/FISH OIL 340-1000MG
CAPSULE ORAL
Qty: 93 CAP | Status: SHIPPED | OUTPATIENT
Start: 2018-07-11 | End: 2018-08-27 | Stop reason: SDUPTHER

## 2018-08-27 ENCOUNTER — TELEPHONE (OUTPATIENT)
Dept: INTERNAL MEDICINE CLINIC | Age: 63
End: 2018-08-27

## 2018-08-27 RX ORDER — FLUOXETINE HYDROCHLORIDE 20 MG/1
20 CAPSULE ORAL DAILY
Qty: 30 CAP | Refills: 3 | Status: SHIPPED | OUTPATIENT
Start: 2018-08-27

## 2018-08-27 RX ORDER — OLANZAPINE 10 MG/1
10 TABLET ORAL
Qty: 30 TAB | Refills: 3 | Status: SHIPPED | OUTPATIENT
Start: 2018-08-27 | End: 2020-01-17 | Stop reason: ALTCHOICE

## 2018-08-27 RX ORDER — BUPROPION HYDROCHLORIDE 300 MG/1
300 TABLET ORAL
Qty: 30 TAB | Refills: 3 | Status: SHIPPED | OUTPATIENT
Start: 2018-08-27

## 2018-08-27 NOTE — TELEPHONE ENCOUNTER
Kristy woods. Stated that Mr. Toro old psych doctor is no longer in practice. He cannot be seen with his new psych until December. She wanted to know if Dr. Miguel Angel Valdez can refill his current medications for psych unti his appointment.  Please advise

## 2018-08-27 NOTE — TELEPHONE ENCOUNTER
Pt has a psych appointment on Sept 5, and pt will be out of meds on the 1st, would like to know if pt can get enough to last him until he see's that doctor.     Meds:  Bupropion hcl xl 300 mg tab  Fluoxetine hcl 20 mg capsul  Olanzapine 10 mg tab

## 2018-09-26 ENCOUNTER — OFFICE VISIT (OUTPATIENT)
Dept: PRIMARY CARE CLINIC | Age: 63
End: 2018-09-26

## 2018-09-26 VITALS
HEIGHT: 66 IN | OXYGEN SATURATION: 97 % | TEMPERATURE: 98.3 F | RESPIRATION RATE: 16 BRPM | BODY MASS INDEX: 25.58 KG/M2 | DIASTOLIC BLOOD PRESSURE: 83 MMHG | SYSTOLIC BLOOD PRESSURE: 121 MMHG | HEART RATE: 67 BPM | WEIGHT: 159.2 LBS

## 2018-09-26 DIAGNOSIS — R19.7 DIARRHEA, UNSPECIFIED TYPE: Primary | ICD-10-CM

## 2018-09-26 DIAGNOSIS — F81.89 DEVELOPMENTAL NON-VERBAL DISORDER: ICD-10-CM

## 2018-09-26 DIAGNOSIS — Z23 ENCOUNTER FOR IMMUNIZATION: ICD-10-CM

## 2018-09-26 RX ORDER — ACETAMINOPHEN 500 MG/1
TABLET, FILM COATED ORAL
Qty: 60 TAB | Refills: 11 | Status: SHIPPED | OUTPATIENT
Start: 2018-09-26 | End: 2020-03-11

## 2018-09-26 NOTE — PROGRESS NOTES
Subjective:     Chief Complaint   Patient presents with    GI Problem     upset stomach x3 days    Fatigue    Diarrhea        He  is a 61y.o. year old male  medical history is significant for moderate mental retardation, non verbal, HLD, urinary incontinence is here  with his group home staff member with a concern about upset stomach for the past three days. The staff member states that they have noticed him having loose stool 3-4 times/day for the past two days but no diarrhea  today. He looks tired and his appetite is decreased per staff member. Denies any fever, vomiting. No sick contacts, recent travel or eating restaurant. History obtained from staff member. Review of systems not obtained due to patient factors. Objective:     Vitals:    09/26/18 1416   BP: 121/83   Pulse: 67   Resp: 16   Temp: 98.3 °F (36.8 °C)   TempSrc: Oral   SpO2: 97%   Weight: 159 lb 3.2 oz (72.2 kg)   Height: 5' 6\" (1.676 m)       Physical Examination: General appearance - alert, well appearing, and in no distress and normal appearing weight  Mental status - alert,  normal mood, behavior, dress, motor activity, non verbal.  Mouth - mucous membranes moist, pharynx normal without lesions  Chest - clear to auscultation, no wheezes, rales or rhonchi, symmetric air entry  Heart - normal rate, regular rhythm, normal S1, S2, no murmurs, rubs, clicks or gallops  Abdomen - soft, nontender, nondistended, no masses or organomegaly. Pt is non verbal. Did not show any grimace or any signs of pain.      No Known Allergies   Social History     Social History    Marital status: UNKNOWN     Spouse name: N/A    Number of children: N/A    Years of education: N/A     Social History Main Topics    Smoking status: Never Smoker    Smokeless tobacco: Never Used    Alcohol use No    Drug use: No    Sexual activity: Not Asked     Other Topics Concern    None     Social History Narrative      Family History   Problem Relation Age of Onset  Family history unknown: Yes      Past Surgical History:   Procedure Laterality Date    COLONOSCOPY,DIAGNOSTIC  11/25/2014         [place] EGD TRANSORAL BIOPSY SINGLE/MULTIPLE  8/1/2013           Past Medical History:   Diagnosis Date    Other ill-defined conditions(799.89)     nonverbal,uses sign language some,mental retardation    Other ill-defined conditions(799.89)     elevated cholesterol    Psychiatric disorder     MR,depression anxiety      Current Outpatient Prescriptions   Medication Sig Dispense Refill    DENTA 5000 PLUS 1.1 % crea BRUSH TEETH TWICE A DAY DO NOT EAT OR DRINK FOR 30 MINUTES AFTER USING 102 g 1    therapeutic multivitamin (THERAGRAN) tablet TAKE 1 TABLET BY MOUTH DAILY 30 Tab 3    atorvastatin (LIPITOR) 10 mg tablet TAKE 1 TABLET BY MOUTH ONCE DAILY IN THE EVENING 90 Tab 1    omeprazole (PRILOSEC) 20 mg capsule Take 1 Cap by mouth daily as needed. 30 Cap 2    buPROPion XL (WELLBUTRIN XL) 300 mg XL tablet Take 1 Tab by mouth every morning. 30 Tab 3    FLUoxetine (PROZAC) 20 mg capsule Take 1 Cap by mouth daily. 30 Cap 3    OLANZapine (ZYPREXA) 10 mg tablet Take 1 Tab by mouth nightly. 30 Tab 3    finasteride (PROSCAR) 5 mg tablet TAKE 1 TABLET DAILY *SPECIAL HANDLING OF TABLETS* 31 Tab 6    omega 3-DHA-EPA-fish oil (FISH OIL) 1,000 mg (120 mg-180 mg) capsule Take 1 Cap by mouth three (3) times daily. 90 Cap 1    albuterol (VENTOLIN HFA) 90 mcg/actuation inhaler Take 2 Puffs by inhalation every four (4) hours as needed for Wheezing.  albuterol sulfate (PROVENTIL;VENTOLIN) 2.5 mg/0.5 mL nebu nebulizer solution 2.5 mg by Nebulization route every four (4) hours as needed for Wheezing (2.5/3ml).  cetirizine (ZYRTEC) 10 mg tablet Take 10 mg by mouth daily.  fluticasone (FLONASE) 50 mcg/actuation nasal spray 2 Sprays by Both Nostrils route daily.  glycerin-dimethicone (NEUTROGENA SENSITIVE SKN MOIST) lotion Apply  to affected area daily.       azelastine (OPTIVAR) 0.05 % ophthalmic solution Administer 1 Drop to both eyes two (2) times a day. Use in affected eye(s)      fluticasone (FLOVENT HFA) 110 mcg/actuation inhaler Take 2 Puffs by inhalation every twelve (12) hours.  montelukast (SINGULAIR) 10 mg tablet Take 10 mg by mouth nightly.  MAPAP EXTRA STRENGTH 500 mg tablet TAKE 1 TABLET EVERY 6 HOURS AS NEEDED FOR HEADACHE OR FEVER (NOT TO EXCEED 06 TABS/DAY) 60 Tab 11    hydrocortisone (HYTONE) 2.5 % topical cream Apply  to affected area two (2) times a day. use thin layer      loperamide (ANTI-DIARRHEAL, LOPERAMIDE,) 2 mg capsule Take 2 mg by mouth two (2) times daily as needed for Diarrhea. Assessment/ Plan:   Diagnoses and all orders for this visit:    1. Diarrhea, unspecified type   -   Likely viral aetiology. No diarrhea today. Advised for soft food for the next 24 hrs followed by regular diet as tolerated. avoid any fatty, fox food       Increase water intake. 2. Developmental non-verbal disorder    3. Encounter for immunization  -     INFLUENZA VIRUS VACCINE QUADRIVALENT, PRESERVATIVE FREE SYRINGE (35200)           Medication risks/benefits/costs/interactions/alternatives discussed with patient. Advised patient to call back or return to office if symptoms worsen/change/persist. If patient cannot reach us or should anything more severe/urgent arise he/she should proceed directly to the nearest emergency department. Discussed expected course/resolution/complications of diagnosis in detail with patient. Patient given a written after visit summary which includes her diagnoses, current medications and vitals. Patient expressed understanding with the diagnosis and plan.        Follow-up Disposition: Not on File

## 2018-09-26 NOTE — PROGRESS NOTES
Chief Complaint   Patient presents with    GI Problem     upset stomach x3 days    Fatigue    Diarrhea     1. Have you been to the ER, urgent care clinic since your last visit? Hospitalized since your last visit? No    2. Have you seen or consulted any other health care providers outside of the [facility] since your last visit? Include any pap smears or colon screening.  Allergist, dermatologist, psychiatrist.

## 2018-09-26 NOTE — MR AVS SNAPSHOT
[facility] 
 
 
 [address] [facility] [address] 
[phone] Patient: Kaitlin Lung MRN: [id number] CDR:[id number]/23/5789 Visit Information Date & Time Provider Department Dept. Phone Encounter #  
 9/26/2018  2:15 PM Duke Espinal MD [facility] [phone] [id number] Follow-up Instructions Return if symptoms worsen or fail to improve. Your Appointments 11/26/2018 10:30 AM  
ROUTINE CARE with Bina Jean Baptiste MD  
[facility] ([address] [place] [address]) Appt Note: follow up [address] [facility] [address]  
[phone] [place] [address]  
  
    
 12/18/2018  3:00 PM  
New Patient with Alejandro Hayward MD  
[address]) Appt Note: Group Home reschedule NP appt. told to arrive at 230pm 8/27/18 [address] [place] [address] Suite [facility] [phone]  
[address] [place] [address] Upcoming Health Maintenance Date Due DTaP/Tdap/Td series (1 - Tdap) 2/14/1976 Shingrix Vaccine Age 50> (1 of 2) 2/14/2005 Influenza Age 5 to Adult 8/1/2018 MEDICARE YEARLY EXAM 5/25/2019 Cologuard Q 3 Years 7/15/2021 Allergies as of 9/26/2018  Review Complete On: 9/26/2018 By: Bina Jean Baptiste MD  
 No Known Allergies Current Immunizations  Reviewed on 5/24/2018 Name Date Influenza Vaccine (Quad) PF  Incomplete Not reviewed this visit You Were Diagnosed With   
  
 Codes Comments Diarrhea, unspecified type    -  Primary ICD-10-CM: R19.7 ICD-9-CM: 787.91 Encounter for immunization     ICD-10-CM: H24 ICD-9-CM: V03.89 Vitals BP Pulse Temp Resp Height(growth percentile) Weight(growth percentile)  121/83 (BP 1 Location: Left arm, BP Patient Position: Sitting) 67 98.3 °F (36.8 °C) (Oral) 16 5' 6\" (1.676 m) 159 lb 3.2 oz (72.2 kg) SpO2 BMI Smoking Status 97% 25.7 kg/m2 Never Smoker Vitals History BMI and BSA Data Body Mass Index Body Surface Area 25.7 kg/m 2 1.83 m 2 Preferred Pharmacy Pharmacy Name Phone [facility], [place] [address] [phone] Your Updated Medication List  
  
   
This list is accurate as of 9/26/18  2:40 PM.  Always use your most recent med list.  
  
  
  
  
 Anti-Diarrheal (Loperamide) 2 mg capsule Generic drug:  loperamide Take 2 mg by mouth two (2) times daily as needed for Diarrhea. atorvastatin 10 mg tablet Commonly known as:  LIPITOR  
TAKE 1 TABLET BY MOUTH ONCE DAILY IN THE EVENING  
  
 azelastine 0.05 % ophthalmic solution Commonly known as:  OPTIVAR Administer 1 Drop to both eyes two (2) times a day. Use in affected eye(s) buPROPion  mg XL tablet Commonly known as:  Johnice Hughes Take 1 Tab by mouth every morning. DENTA 5000 PLUS 1.1 % Crea Generic drug:  fluoride (sodium) BRUSH TEETH TWICE A DAY DO NOT EAT OR DRINK FOR 30 MINUTES AFTER USING  
  
 finasteride 5 mg tablet Commonly known as:  PROSCAR  
TAKE 1 TABLET DAILY *SPECIAL HANDLING OF TABLETS* FLUoxetine 20 mg capsule Commonly known as:  PROzac Take 1 Cap by mouth daily. * fluticasone 50 mcg/actuation nasal spray Commonly known as:  Caitlyn Jumper 2 Sprays by Both Nostrils route daily. * FLOVENT  mcg/actuation inhaler Generic drug:  fluticasone Take 2 Puffs by inhalation every twelve (12) hours. hydrocortisone 2.5 % topical cream  
Commonly known as:  HYTONE Apply  to affected area two (2) times a day. use thin layer MAPAP EXTRA STRENGTH 500 mg tablet Generic drug:  acetaminophen TAKE 1 TABLET EVERY 6 HOURS AS NEEDED FOR HEADACHE OR FEVER (NOT TO EXCEED 06 TABS/DAY) NEUTROGENA SENSITIVE SKN MOIST lotion Generic drug:  glycerin-dimethicone Apply  to affected area daily. OLANZapine 10 mg tablet Commonly known as:  ZyPREXA Take 1 Tab by mouth nightly. omega 3-DHA-EPA-fish oil 1,000 mg (120 mg-180 mg) capsule Commonly known as:  FISH OIL Take 1 Cap by mouth three (3) times daily. omeprazole 20 mg capsule Commonly known as:  PRILOSEC Take 1 Cap by mouth daily as needed. SINGULAIR 10 mg tablet Generic drug:  montelukast  
Take 10 mg by mouth nightly. therapeutic multivitamin tablet Commonly known as:  THERAGRAN  
TAKE 1 TABLET BY MOUTH DAILY * VENTOLIN HFA 90 mcg/actuation inhaler Generic drug:  albuterol Take 2 Puffs by inhalation every four (4) hours as needed for Wheezing. * albuterol sulfate 2.5 mg/0.5 mL Nebu nebulizer solution Commonly known as:  PROVENTIL;VENTOLIN  
2.5 mg by Nebulization route every four (4) hours as needed for Wheezing (2.5/3ml). ZyrTEC 10 mg tablet Generic drug:  cetirizine Take 10 mg by mouth daily. * Notice: This list has 4 medication(s) that are the same as other medications prescribed for you. Read the directions carefully, and ask your doctor or other care provider to review them with you. We Performed the Following INFLUENZA VIRUS VAC QUAD,SPLIT,PRESV FREE SYRINGE IM [id number] CPT(R)] Follow-up Instructions Return if symptoms worsen or fail to improve. Introducing [facility]! [employer] introduces [employer] patient portal. Now you can access parts of your medical record, email your doctor's office, and request medication refills online. 1. In your internet browser, go to [email or web address] 2. Click on the First Time User? Click Here link in the Sign In box. You will see the New Member Sign Up page. 3. Enter your Yot[employer] Access Code exactly as it appears below.  You will not need to use this code after youve completed the sign-up process. If you do not sign up before the expiration date, you must request a new code. · [employer] Access Code: [email or web address] Expires: 12/25/2018  2:25 PM 
 
4. Enter the last four digits of your Social Security Number (xxxx) and Date of Birth (mm/dd/yyyy) as indicated and click Submit. You will be taken to the next sign-up page. 5. Create a [employer] ID. This will be your [employer] login ID and cannot be changed, so think of one that is secure and easy to remember. 6. Create a [employer] password. You can change your password at any time. 7. Enter your Password Reset Question and Answer. This can be used at a later time if you forget your password. 8. Enter your e-mail address. You will receive e-mail notification when new information is available in [address] 9. Click Sign Up. You can now view and download portions of your medical record. 10. Click the Download Summary menu link to download a portable copy of your medical information. If you have questions, please visit the Frequently Asked Questions section of the [employer] website. Remember, [employer] is NOT to be used for urgent needs. For medical emergencies, dial 911. Now available from your iPhone and Android! Please provide this summary of care documentation to your next provider. Your primary care clinician is listed as Duke Lloyd. If you have any questions after today's visit, please call [phone].

## 2018-10-03 RX ORDER — OMEPRAZOLE 20 MG/1
20 CAPSULE, DELAYED RELEASE ORAL
Qty: 30 CAP | Refills: 2 | Status: CANCELLED | OUTPATIENT
Start: 2018-10-03

## 2018-10-03 NOTE — TELEPHONE ENCOUNTER
I just spoke with Honey Urban( caregiver) and clarified the Omeprazole prescription. Advised that since he has been taking the omeprazole daily for years  he have a chance of having side efefct such as bone loss, C. difff according to research. Advised to monitor his symptoms, avoid triggering factors and provide med as needed.

## 2018-10-03 NOTE — TELEPHONE ENCOUNTER
Deepa Dago' provider is requesting us to send another prescription for him that is for daily, not PRN. This is for his omeprazole. She states it was never PRN.

## 2018-11-20 ENCOUNTER — HOSPITAL ENCOUNTER (OUTPATIENT)
Dept: CT IMAGING | Age: 63
Discharge: HOME | End: 2018-11-20
Attending: SPECIALIST
Payer: MEDICARE

## 2018-11-20 DIAGNOSIS — H65.23 BILATERAL CHRONIC SEROUS OTITIS MEDIA: ICD-10-CM

## 2018-11-20 DIAGNOSIS — H90.3 SENSORY HEARING LOSS, BILATERAL: ICD-10-CM

## 2018-11-20 PROCEDURE — 70480 CT ORBIT/EAR/FOSSA W/O DYE: CPT

## 2018-11-26 ENCOUNTER — OFFICE VISIT (OUTPATIENT)
Dept: PRIMARY CARE CLINIC | Age: 63
End: 2018-11-26

## 2018-11-26 VITALS
HEIGHT: 66 IN | HEART RATE: 63 BPM | SYSTOLIC BLOOD PRESSURE: 120 MMHG | DIASTOLIC BLOOD PRESSURE: 82 MMHG | BODY MASS INDEX: 25.23 KG/M2 | WEIGHT: 157 LBS | RESPIRATION RATE: 17 BRPM | OXYGEN SATURATION: 95 % | TEMPERATURE: 98.4 F

## 2018-11-26 DIAGNOSIS — E78.5 HYPERLIPIDEMIA, UNSPECIFIED HYPERLIPIDEMIA TYPE: Primary | ICD-10-CM

## 2018-11-26 DIAGNOSIS — J45.20 MILD INTERMITTENT ASTHMA WITHOUT COMPLICATION: ICD-10-CM

## 2018-11-26 DIAGNOSIS — F81.89 DEVELOPMENTAL NON-VERBAL DISORDER: ICD-10-CM

## 2018-11-26 DIAGNOSIS — Z23 NEED FOR DIPHTHERIA-TETANUS-PERTUSSIS (TDAP) VACCINE: ICD-10-CM

## 2018-11-26 DIAGNOSIS — F71 MODERATE MENTAL RETARDATION: ICD-10-CM

## 2018-11-26 NOTE — PROGRESS NOTES
Subjective:     Chief Complaint   Patient presents with    Other     6 month f/u        He  is a 61y.o. year old male  who presents with his group home staff member . His past medical history is significant for moderate mental retardation, non verbal, HLD, asthma, urinary incontinence. No new concerns today. Doing well.      HLD; He is taking Lipitor 10 mg daily.     BPH related urinary incontinence: Has been taking Finasteride for urinary incontinence. Asthma: well controlled with Flovent. Has been following with ENT for ? Hearing loss. Review of systems not obtained due to patient factors. Objective:     Vitals:    11/26/18 1020   BP: 120/82   Pulse: 63   Resp: 17   Temp: 98.4 °F (36.9 °C)   TempSrc: Oral   SpO2: 95%   Weight: 157 lb (71.2 kg)   Height: 5' 6\" (1.676 m)       Physical Examination: General appearance - alert, well appearing, and in no distress, oriented to person,  and normal appearing weight  Mental status - alert, oriented to person, normal mood, behavior,  dress, motor activity, non verbal, pleasant.   Ears - bilateral TM's and external ear canals normal  Mouth - mucous membranes moist, pharynx normal without lesions  Neck - supple, no significant adenopathy  Chest - clear to auscultation, no wheezes, rales or rhonchi, symmetric air entry  Heart - normal rate, regular rhythm, normal S1, S2, no murmurs, rubs, clicks or gallops  Extremities - no pedal edema noted    No Known Allergies   Social History     Socioeconomic History    Marital status: UNKNOWN     Spouse name: Not on file    Number of children: Not on file    Years of education: Not on file    Highest education level: Not on file   Tobacco Use    Smoking status: Never Smoker    Smokeless tobacco: Never Used   Substance and Sexual Activity    Alcohol use: No    Drug use: No      Family History   Family history unknown: Yes      Past Surgical History:   Procedure Laterality Date    COLONOSCOPY,DIAGNOSTIC  11/25/2014  [place] EGD TRANSORAL BIOPSY SINGLE/MULTIPLE  8/1/2013           Past Medical History:   Diagnosis Date    Other ill-defined conditions(799.89)     nonverbal,uses sign language some,mental retardation    Other ill-defined conditions(799.89)     elevated cholesterol    Psychiatric disorder     MR,depression anxiety      Current Outpatient Medications   Medication Sig Dispense Refill    MAPAP EXTRA STRENGTH 500 mg tablet TAKE 1 TABLET EVERY 6 HOURS AS NEEDED FOR HEADACHE OR FEVER (NOT TO EXCEED 06 TABS/DAY) 60 Tab 11    DENTA 5000 PLUS 1.1 % crea BRUSH TEETH TWICE A DAY DO NOT EAT OR DRINK FOR 30 MINUTES AFTER USING 102 g 1    therapeutic multivitamin (THERAGRAN) tablet TAKE 1 TABLET BY MOUTH DAILY 30 Tab 3    atorvastatin (LIPITOR) 10 mg tablet TAKE 1 TABLET BY MOUTH ONCE DAILY IN THE EVENING 90 Tab 1    omeprazole (PRILOSEC) 20 mg capsule Take 1 Cap by mouth daily as needed. 30 Cap 2    buPROPion XL (WELLBUTRIN XL) 300 mg XL tablet Take 1 Tab by mouth every morning. 30 Tab 3    FLUoxetine (PROZAC) 20 mg capsule Take 1 Cap by mouth daily. 30 Cap 3    OLANZapine (ZYPREXA) 10 mg tablet Take 1 Tab by mouth nightly. 30 Tab 3    finasteride (PROSCAR) 5 mg tablet TAKE 1 TABLET DAILY *SPECIAL HANDLING OF TABLETS* 31 Tab 6    omega 3-DHA-EPA-fish oil (FISH OIL) 1,000 mg (120 mg-180 mg) capsule Take 1 Cap by mouth three (3) times daily. 90 Cap 1    albuterol (VENTOLIN HFA) 90 mcg/actuation inhaler Take 2 Puffs by inhalation every four (4) hours as needed for Wheezing.  hydrocortisone (HYTONE) 2.5 % topical cream Apply  to affected area two (2) times a day. use thin layer      loperamide (ANTI-DIARRHEAL, LOPERAMIDE,) 2 mg capsule Take 2 mg by mouth two (2) times daily as needed for Diarrhea.  albuterol sulfate (PROVENTIL;VENTOLIN) 2.5 mg/0.5 mL nebu nebulizer solution 2.5 mg by Nebulization route every four (4) hours as needed for Wheezing (2.5/3ml).       cetirizine (ZYRTEC) 10 mg tablet Take 10 mg by mouth daily.  fluticasone (FLONASE) 50 mcg/actuation nasal spray 2 Sprays by Both Nostrils route daily.  glycerin-dimethicone (NEUTROGENA SENSITIVE SKN MOIST) lotion Apply  to affected area daily.  azelastine (OPTIVAR) 0.05 % ophthalmic solution Administer 1 Drop to both eyes two (2) times a day. Use in affected eye(s)      fluticasone (FLOVENT HFA) 110 mcg/actuation inhaler Take 2 Puffs by inhalation every twelve (12) hours.  montelukast (SINGULAIR) 10 mg tablet Take 10 mg by mouth nightly. Assessment/ Plan:   Diagnoses and all orders for this visit:    1. Hyperlipidemia, unspecified hyperlipidemia type  -     METABOLIC PANEL, COMPREHENSIVE  -     Continue Lipitor 10 mg. Lab Results   Component Value Date/Time    Cholesterol, total 125 05/24/2018 11:17 AM    HDL Cholesterol 40 05/24/2018 11:17 AM    LDL, calculated 66 05/24/2018 11:17 AM    VLDL, calculated 19 05/24/2018 11:17 AM    Triglyceride 94 05/24/2018 11:17 AM       2. Need for diphtheria-tetanus-pertussis (Tdap) vaccine  -     TETANUS, DIPHTHERIA TOXOIDS AND ACELLULAR PERTUSSIS VACCINE (TDAP), IN INDIVIDS. >=7, IM    3. Moderate mental retardation      - stable. 4. Mild intermittent asthma without complication      - well controlled with Flovent, Singulair. 5. Developmental non-verbal disorder           Medication risks/benefits/costs/interactions/alternatives discussed with patient. Advised patient to call back or return to office if symptoms worsen/change/persist. If patient cannot reach us or should anything more severe/urgent arise he/she should proceed directly to the nearest emergency department. Discussed expected course/resolution/complications of diagnosis in detail with patient. Patient given a written after visit summary which includes her diagnoses, current medications and vitals. Patient expressed understanding with the diagnosis and plan.        Follow-up Disposition:  Return in about 6 months (around 5/26/2019) for medicare and fasting blood work. Linda Mejias

## 2018-11-27 LAB
ALBUMIN SERPL-MCNC: 4.5 G/DL (ref 3.6–4.8)
ALBUMIN/GLOB SERPL: 1.8 {RATIO} (ref 1.2–2.2)
ALP SERPL-CCNC: 87 IU/L (ref 39–117)
ALT SERPL-CCNC: 16 IU/L (ref 0–44)
AST SERPL-CCNC: 21 IU/L (ref 0–40)
BILIRUB SERPL-MCNC: 0.6 MG/DL (ref 0–1.2)
BUN SERPL-MCNC: 21 MG/DL (ref 8–27)
BUN/CREAT SERPL: 16 (ref 10–24)
CALCIUM SERPL-MCNC: 9.1 MG/DL (ref 8.6–10.2)
CHLORIDE SERPL-SCNC: 101 MMOL/L (ref 96–106)
CO2 SERPL-SCNC: 27 MMOL/L (ref 20–29)
CREAT SERPL-MCNC: 1.29 MG/DL (ref 0.76–1.27)
GLOBULIN SER CALC-MCNC: 2.5 G/DL (ref 1.5–4.5)
GLUCOSE SERPL-MCNC: 84 MG/DL (ref 65–99)
POTASSIUM SERPL-SCNC: 4.4 MMOL/L (ref 3.5–5.2)
PROT SERPL-MCNC: 7 G/DL (ref 6–8.5)
SODIUM SERPL-SCNC: 142 MMOL/L (ref 134–144)

## 2018-11-28 NOTE — PROGRESS NOTES
Please call patient:    1. There are some abnormality with his kidney function. Not very concerning. Need a follow up BMP in two months. Increase water intake.

## 2019-04-03 RX ORDER — SODIUM FLUORIDE 1.1 G/100G
GEL, DENTIFRICE ORAL
Qty: 102 G | Refills: 0 | Status: SHIPPED | OUTPATIENT
Start: 2019-04-03 | End: 2019-11-06 | Stop reason: SDUPTHER

## 2019-05-20 ENCOUNTER — TELEPHONE (OUTPATIENT)
Dept: PRIMARY CARE CLINIC | Age: 64
End: 2019-05-20

## 2019-05-20 NOTE — TELEPHONE ENCOUNTER
Advised that we did not receive any order form for incontinence supplies. Fax number provided. They will resend and expect form to be completed and faxed back today.

## 2019-05-20 NOTE — TELEPHONE ENCOUNTER
[facility] called to make sure an order was received for pt.  Please follow up [phone]

## 2019-05-21 NOTE — TELEPHONE ENCOUNTER
[facility] called and wanted to see if we received incontinence form, would like a call back

## 2019-05-30 ENCOUNTER — OFFICE VISIT (OUTPATIENT)
Dept: PRIMARY CARE CLINIC | Age: 64
End: 2019-05-30

## 2019-05-30 VITALS
HEIGHT: 66 IN | BODY MASS INDEX: 26.36 KG/M2 | TEMPERATURE: 98.8 F | RESPIRATION RATE: 17 BRPM | OXYGEN SATURATION: 95 % | HEART RATE: 69 BPM | WEIGHT: 164 LBS | DIASTOLIC BLOOD PRESSURE: 80 MMHG | SYSTOLIC BLOOD PRESSURE: 124 MMHG

## 2019-05-30 DIAGNOSIS — Z00.00 MEDICARE ANNUAL WELLNESS VISIT, SUBSEQUENT: Primary | ICD-10-CM

## 2019-05-30 DIAGNOSIS — Z11.1 SCREENING-PULMONARY TB: ICD-10-CM

## 2019-05-30 DIAGNOSIS — Z13.39 SCREENING FOR ALCOHOLISM: ICD-10-CM

## 2019-05-30 DIAGNOSIS — N39.41 BENIGN PROSTATIC HYPERPLASIA (BPH) WITH URINARY URGE INCONTINENCE: ICD-10-CM

## 2019-05-30 DIAGNOSIS — Z12.5 SCREENING FOR PROSTATE CANCER: ICD-10-CM

## 2019-05-30 DIAGNOSIS — N40.1 BENIGN PROSTATIC HYPERPLASIA (BPH) WITH URINARY URGE INCONTINENCE: ICD-10-CM

## 2019-05-30 NOTE — PROGRESS NOTES
Chief Complaint   Patient presents with   [facility] Annual Wellness Visit     3 most recent PHQ Screens 5/30/2019   Little interest or pleasure in doing things Not at all   Feeling down, depressed, irritable, or hopeless Not at all   Total Score PHQ 2 0     Fall Risk Assessment, last 12 mths 5/30/2019   Able to walk? Yes   Fall in past 12 months? No     Abuse Screening Questionnaire 5/30/2019   Do you ever feel afraid of your partner? N   Are you in a relationship with someone who physically or mentally threatens you? N   Is it safe for you to go home?  Ayaka Bhagato

## 2019-05-30 NOTE — PATIENT INSTRUCTIONS
Medicare Wellness Visit, Male    The best way to live healthy is to have a lifestyle where you eat a well-balanced diet, exercise regularly, limit alcohol use, and quit all forms of tobacco/nicotine, if applicable. Regular preventive services are another way to keep healthy. Preventive services (vaccines, screening tests, monitoring & exams) can help personalize your care plan, which helps you manage your own care. Screening tests can find health problems at the earliest stages, when they are easiest to treat. [facility] follows the current, evidence-based guidelines published by the [facility] ([facility]) when recommending preventive services for our patients. Because we follow these guidelines, sometimes recommendations change over time as research supports it. (For example, a prostate screening blood test is no longer routinely recommended for men with no symptoms.)  Of course, you and your doctor may decide to screen more often for some diseases, based on your risk and co-morbidities (chronic disease you are already diagnosed with). Preventive services for you include:  - Medicare offers their members a free annual wellness visit, which is time for you and your primary care provider to discuss and plan for your preventive service needs. Take advantage of this benefit every year!  -All adults over age 72 should receive the recommended pneumonia vaccines. Current USPSTF guidelines recommend a series of two vaccines for the best pneumonia protection.   -All adults should have a flu vaccine yearly and an ECG.  All adults age 61 and older should receive a shingles vaccine once in their lifetime.    -All adults age 38-68 who are overweight should have a diabetes screening test once every three years.   -Other screening tests & preventive services for persons with diabetes include: an eye exam to screen for diabetic retinopathy, a kidney function test, a foot exam, and stricter control over your cholesterol.   -Cardiovascular screening for adults with routine risk involves an electrocardiogram (ECG) at intervals determined by the provider.   -Colorectal cancer screening should be done for adults age 54-65 with no increased risk factors for colorectal cancer. There are a number of acceptable methods of screening for this type of cancer. Each test has its own benefits and drawbacks. Discuss with your provider what is most appropriate for you during your annual wellness visit. The different tests include: colonoscopy (considered the best screening method), a fecal occult blood test, a fecal DNA test, and sigmoidoscopy.  -All adults born between [facility] should be screened once for Hepatitis C.  -An Abdominal Aortic Aneurysm (AAA) Screening is recommended for men age 73-68 who has ever smoked in their lifetime. Here is a list of your current Health Maintenance items (your personalized list of preventive services) with a due date:  Health Maintenance Due   Topic Date Due    Pneumococcal Vaccine (1 of 1 - PPSV23) 02[birth year]14/1961    Shingles Vaccine (1 of 2) 02/14/2005    Annual Well Visit  05/25/2019        Well Visit, Men 48 to 72: Care Instructions  Your Care Instructions    Physical exams can help you stay healthy. Your doctor has checked your overall health and may have suggested ways to take good care of yourself. He or she also may have recommended tests. At home, you can help prevent illness with healthy eating, regular exercise, and other steps. Follow-up care is a key part of your treatment and safety. Be sure to make and go to all appointments, and call your doctor if you are having problems. It's also a good idea to know your test results and keep a list of the medicines you take. How can you care for yourself at home? · Reach and stay at a healthy weight.  This will lower your risk for many problems, such as obesity, diabetes, heart disease, and high blood pressure. · Get at least 30 minutes of exercise on most days of the week. Walking is a good choice. You also may want to do other activities, such as running, swimming, cycling, or playing tennis or team sports. · Do not smoke. Smoking can make health problems worse. If you need help quitting, talk to your doctor about stop-smoking programs and medicines. These can increase your chances of quitting for good. · Protect your skin from too much sun. When you're outdoors from 10 a.m. to 4 p.m., stay in the shade or cover up with clothing and a hat with a wide brim. Wear sunglasses that block UV rays. Even when it's cloudy, put broad-spectrum sunscreen (SPF 30 or higher) on any exposed skin. · See a dentist one or two times a year for checkups and to have your teeth cleaned. · Wear a seat belt in the car. · Limit alcohol to 2 drinks a day. Too much alcohol can cause health problems. Follow your doctor's advice about when to have certain tests. These tests can spot problems early. · Cholesterol. Your doctor will tell you how often to have this done based on your overall health and other things that can increase your risk for heart attack and stroke. · Blood pressure. Have your blood pressure checked during a routine doctor visit. Your doctor will tell you how often to check your blood pressure based on your age, your blood pressure results, and other factors. · Prostate exam. Talk to your doctor about whether you should have a blood test (called a PSA test) for prostate cancer. Experts disagree on whether men should have this test. Some experts recommend that you discuss the benefits and risks of the test with your doctor. · Diabetes. Ask your doctor whether you should have tests for diabetes. · Vision. Some experts recommend that you have yearly exams for glaucoma and other age-related eye problems starting at age 48. · Hearing. Tell your doctor if you notice any change in your hearing.  You can have tests to find out how well you hear. · Colon cancer. You should begin tests for colon cancer at age 48. You may have one of several tests. Your doctor will tell you how often to have tests based on your age and risk. Risks include whether you already had a precancerous polyp removed from your colon or whether your parent, brother, sister, or child has had colon cancer. · Heart attack and stroke risk. At least every 4 to 6 years, you should have your risk for heart attack and stroke assessed. Your doctor uses factors such as your age, blood pressure, cholesterol, and whether you smoke or have diabetes to show what your risk for a heart attack or stroke is over the next 10 years. · Abdominal aortic aneurysm. Ask your doctor whether you should have a test to check for an aneurysm. You may need a test if you ever smoked or if your parent, brother, sister, or child has had an aneurysm. When should you call for help? Watch closely for changes in your health, and be sure to contact your doctor if you have any problems or symptoms that concern you. Where can you learn more? Go to http://niyah-corine.info/. Enter O834 in the search box to learn more about \"Well Visit, Men 48 to 72: Care Instructions. \"  Current as of: March 28, 2018  Content Version: 11.9  © 3761-3134 [employer]. Care instructions adapted under license by [employer] (which disclaims liability or warranty for this information). If you have questions about a medical condition or this instruction, always ask your healthcare professional. [place] [address] any warranty or liability for your use of this information.

## 2019-05-30 NOTE — PROGRESS NOTES
This is the Subsequent Medicare Annual Wellness Exam, performed 12 months or more after the Initial AWV or the last Subsequent AWV    I have reviewed the patient's medical history in detail and updated the computerized patient record. He  is a 59y.o. year old male  who presents with his group home staff member .  His past medical history is significant for moderate mental retardation, non verbal, HLD, asthma, urinary incontinence. No new concerns today. Doing well.      HLD; He is taking Lipitor 10 mg daily.     BPH related urinary incontinence: Has been taking Finasteride for urinary incontinence.     Asthma: Well controlled with Flovent. ROS: Unable to obtain dud to patient factor.          History     Past Medical History:   Diagnosis Date    Other ill-defined conditions(799.89)     nonverbal,uses sign language some,mental retardation    Other ill-defined conditions(799.89)     elevated cholesterol    Psychiatric disorder     MR,depression anxiety      Past Surgical History:   Procedure Laterality Date    COLONOSCOPY,DIAGNOSTIC  11/25/2014         [place] TRANSORAL BIOPSY SINGLE/MULTIPLE  8/1/2013          Current Outpatient Medications   Medication Sig Dispense Refill    therapeutic multivitamin (THERAGRAN) tablet TAKE 1 TABLET BY MOUTH DAILY 31 Tab 2    finasteride (PROSCAR) 5 mg tablet TAKE 1 TABLET DAILY *SPECIAL HANDLING OF TABLETS* 30 Tab 6    DENTA 5000 PLUS 1.1 % crea BRUSH TEETH TWICE A DAY DO NOT EAT OR DRINK FOR 30 MINUTES AFTER USING 102 g 0    atorvastatin (LIPITOR) 10 mg tablet TAKE 1 TABLET BY MOUTH ONCE DAILY IN THE EVENING 31 Tab 3    buPROPion XL (WELLBUTRIN XL) 300 mg XL tablet Take 1 Tab by mouth every morning. 30 Tab 3    FLUoxetine (PROZAC) 20 mg capsule Take 1 Cap by mouth daily. 30 Cap 3    OLANZapine (ZYPREXA) 10 mg tablet Take 1 Tab by mouth nightly.  30 Tab 3    omega 3-DHA-EPA-fish oil (FISH OIL) 1,000 mg (120 mg-180 mg) capsule Take 1 Cap by mouth three (3) times daily. 90 Cap 1    cetirizine (ZYRTEC) 10 mg tablet Take 10 mg by mouth daily.  azelastine (OPTIVAR) 0.05 % ophthalmic solution Administer 1 Drop to both eyes two (2) times a day. Use in affected eye(s)      fluticasone (FLOVENT HFA) 110 mcg/actuation inhaler Take 2 Puffs by inhalation every twelve (12) hours.  montelukast (SINGULAIR) 10 mg tablet Take 10 mg by mouth nightly.  MAPAP EXTRA STRENGTH 500 mg tablet TAKE 1 TABLET EVERY 6 HOURS AS NEEDED FOR HEADACHE OR FEVER (NOT TO EXCEED 06 TABS/DAY) 60 Tab 11    omeprazole (PRILOSEC) 20 mg capsule Take 1 Cap by mouth daily as needed. 30 Cap 2    albuterol (VENTOLIN HFA) 90 mcg/actuation inhaler Take 2 Puffs by inhalation every four (4) hours as needed for Wheezing.  hydrocortisone (HYTONE) 2.5 % topical cream Apply  to affected area two (2) times a day. use thin layer      loperamide (ANTI-DIARRHEAL, LOPERAMIDE,) 2 mg capsule Take 2 mg by mouth two (2) times daily as needed for Diarrhea.  albuterol sulfate (PROVENTIL;VENTOLIN) 2.5 mg/0.5 mL nebu nebulizer solution 2.5 mg by Nebulization route every four (4) hours as needed for Wheezing (2.5/3ml).  fluticasone (FLONASE) 50 mcg/actuation nasal spray 2 Sprays by Both Nostrils route daily.  glycerin-dimethicone (NEUTROGENA SENSITIVE SKN MOIST) lotion Apply  to affected area daily.        No Known Allergies  Family History   Family history unknown: Yes     Social History     Tobacco Use    Smoking status: Never Smoker    Smokeless tobacco: Never Used   Substance Use Topics    Alcohol use: No     Patient Active Problem List   Diagnosis Code    Moderate mental retardation F71    Developmental non-verbal disorder F81.89    Hyperlipidemia E78.5    Mild intermittent asthma without complication Q52.24       Depression Risk Factor Screening:     3 most recent PHQ Screens 5/30/2019   Little interest or pleasure in doing things Not at all   Feeling down, depressed, irritable, or hopeless Not at all   Total Score PHQ 2 0     Alcohol Risk Factor Screening: You do not drink alcohol or very rarely. Functional Ability and Level of Safety:   Hearing Loss  He was responding to my questions with gesture. Activities of Daily Living  The home contains: grab bars and rugs  Patient needs help with:  phone, transportation, shopping, preparing meals, laundry, housework, managing medications and managing money    Fall Risk  Fall Risk Assessment, last 12 mths 5/30/2019   Able to walk? Yes   Fall in past 12 months? No       Abuse Screen  Patient is not abused     Physical Examination: General appearance - alert, well appearing, and in no distress, oriented to person,  and normal appearing weight  Mental status - alert, oriented to person, normal mood, behavior,  dress, motor activity, non verbal, pleasant. Ears - bilateral TM's and external ear canals normal  Mouth - mucous membranes moist, pharynx normal without lesions  Neck - supple, no significant adenopathy  Chest - clear to auscultation, no wheezes, rales or rhonchi, symmetric air entry  Heart - normal rate, regular rhythm, normal S1, S2, no murmurs, rubs, clicks or gallops  Extremities - no pedal edema noted      Cognitive Screening   Evaluation of Cognitive Function:  Has your family/caregiver stated any concerns about your memory: NA  Abnormal    Patient Care Team   Patient Care Team:  Shanye Moya MD as PCP - General (Family Practice)  Teodora Royal RN (Psychiatry)    Assessment/Plan   Education and counseling provided:  Are appropriate based on today's review and evaluation  Pneumococcal Vaccine  Prostate cancer screening tests (PSA, covered annually)  Diabetes screening test    Diagnoses and all orders for this visit:    1.  Medicare annual wellness visit, subsequent  -     [place] ANNUAL ALCOHOL SCREEN 15 MIN  -     LIPID PANEL  -     METABOLIC PANEL, COMPREHENSIVE  -     CBC WITH AUTOMATED DIFF  -     HEMOGLOBIN A1C WITH EAG  -     PSA W/ REFLX FREE PSA    2. Benign prostatic hyperplasia (BPH) with urinary urge incontinence        -  Stable. Continue current med.        3. Screening for alcoholism  -     [place] ANNUAL ALCOHOL SCREEN 15 MIN    4. Screening-pulmonary TB  -     QUANTIFERON-TB GOLD PLUS    5. Screening for prostate cancer  -     PSA W/ REFLX FREE PSA      Health Maintenance Due   Topic Date Due    Pneumococcal 0-64 years (1 of 1 - PPSV23) [birth year]14/1961    Shingrix Vaccine Age 50> (1 of 2) 02/14/2005

## 2019-05-31 LAB
ALBUMIN SERPL-MCNC: 4.5 G/DL (ref 3.6–4.8)
ALBUMIN/GLOB SERPL: 1.7 {RATIO} (ref 1.2–2.2)
ALP SERPL-CCNC: 96 IU/L (ref 39–117)
ALT SERPL-CCNC: 18 IU/L (ref 0–44)
AST SERPL-CCNC: 18 IU/L (ref 0–40)
BASOPHILS # BLD AUTO: 0 X10E3/UL (ref 0–0.2)
BASOPHILS NFR BLD AUTO: 0 %
BILIRUB SERPL-MCNC: 0.5 MG/DL (ref 0–1.2)
BUN SERPL-MCNC: 17 MG/DL (ref 8–27)
BUN/CREAT SERPL: 18 (ref 10–24)
CALCIUM SERPL-MCNC: 9.5 MG/DL (ref 8.6–10.2)
CHLORIDE SERPL-SCNC: 105 MMOL/L (ref 96–106)
CHOLEST SERPL-MCNC: 122 MG/DL (ref 100–199)
CO2 SERPL-SCNC: 24 MMOL/L (ref 20–29)
CREAT SERPL-MCNC: 0.92 MG/DL (ref 0.76–1.27)
EOSINOPHIL # BLD AUTO: 0.1 X10E3/UL (ref 0–0.4)
EOSINOPHIL NFR BLD AUTO: 1 %
ERYTHROCYTE [DISTWIDTH] IN BLOOD BY AUTOMATED COUNT: 14 % (ref 12.3–15.4)
EST. AVERAGE GLUCOSE BLD GHB EST-MCNC: 105 MG/DL
GLOBULIN SER CALC-MCNC: 2.7 G/DL (ref 1.5–4.5)
GLUCOSE SERPL-MCNC: 82 MG/DL (ref 65–99)
HBA1C MFR BLD: 5.3 % (ref 4.8–5.6)
HCT VFR BLD AUTO: 46.1 % (ref 37.5–51)
HDLC SERPL-MCNC: 42 MG/DL
HGB BLD-MCNC: 15.2 G/DL (ref 13–17.7)
IMM GRANULOCYTES # BLD AUTO: 0 X10E3/UL (ref 0–0.1)
IMM GRANULOCYTES NFR BLD AUTO: 0 %
LDLC SERPL CALC-MCNC: 60 MG/DL (ref 0–99)
LYMPHOCYTES # BLD AUTO: 2.4 X10E3/UL (ref 0.7–3.1)
LYMPHOCYTES NFR BLD AUTO: 37 %
MCH RBC QN AUTO: 31.5 PG (ref 26.6–33)
MCHC RBC AUTO-ENTMCNC: 33 G/DL (ref 31.5–35.7)
MCV RBC AUTO: 96 FL (ref 79–97)
MONOCYTES # BLD AUTO: 0.5 X10E3/UL (ref 0.1–0.9)
MONOCYTES NFR BLD AUTO: 7 %
NEUTROPHILS # BLD AUTO: 3.6 X10E3/UL (ref 1.4–7)
NEUTROPHILS NFR BLD AUTO: 55 %
PLATELET # BLD AUTO: 178 X10E3/UL (ref 150–450)
POTASSIUM SERPL-SCNC: 4.3 MMOL/L (ref 3.5–5.2)
PROT SERPL-MCNC: 7.2 G/DL (ref 6–8.5)
PSA SERPL-MCNC: 0.3 NG/ML (ref 0–4)
RBC # BLD AUTO: 4.82 X10E6/UL (ref 4.14–5.8)
REFLEX CRITERIA: NORMAL
SODIUM SERPL-SCNC: 141 MMOL/L (ref 134–144)
TRIGL SERPL-MCNC: 101 MG/DL (ref 0–149)
VLDLC SERPL CALC-MCNC: 20 MG/DL (ref 5–40)
WBC # BLD AUTO: 6.6 X10E3/UL (ref 3.4–10.8)

## 2019-05-31 NOTE — PROGRESS NOTES
Please mail letter:    CBC, kidney, liver, cholesterol level is good. Continue current med. Prostate enzyme level is normal.  No diabetes.

## 2019-06-04 ENCOUNTER — TELEPHONE (OUTPATIENT)
Dept: PRIMARY CARE CLINIC | Age: 64
End: 2019-06-04

## 2019-06-04 LAB
GAMMA INTERFERON BACKGROUND BLD IA-ACNC: 0.04 IU/ML
M TB IFN-G BLD-IMP: NEGATIVE
M TB IFN-G CD4+ BCKGRND COR BLD-ACNC: 0.05 IU/ML
MITOGEN IGNF BLD-ACNC: >10 IU/ML
QUANTIFERON INCUBATION, QF1T: NORMAL
QUANTIFERON TB2 AG: 0.05 IU/ML
SERVICE CMNT-IMP: NORMAL

## 2019-06-04 NOTE — TELEPHONE ENCOUNTER
Results faxed as requested. Spoke with [employer] with Gilmer Tavares and advised that tb test usually takes about 1 wk. As soon as we get them, we will let them know. She verbalized her understanding.

## 2019-06-04 NOTE — TELEPHONE ENCOUNTER
Sunny Cerda needs lab results faxed to: [phone].     They would also like a call in regards to the tb test. [phone]

## 2019-08-30 ENCOUNTER — TELEPHONE (OUTPATIENT)
Dept: PRIMARY CARE CLINIC | Age: 64
End: 2019-08-30

## 2019-08-30 RX ORDER — CALCIUM CARBONATE 500(1250)
TABLET ORAL
Qty: 12 TAB | Refills: 0 | Status: SHIPPED | OUTPATIENT
Start: 2019-08-30 | End: 2020-08-05

## 2019-08-30 NOTE — TELEPHONE ENCOUNTER
Anti-diarrheal 2mg tab was prescribed by dr Maya oPnd former PCP and patient is out.  if you would like to continue patient on med would need a new script Tracee Mckay [phone] [facility]

## 2019-10-25 ENCOUNTER — OFFICE VISIT (OUTPATIENT)
Dept: PRIMARY CARE CLINIC | Age: 64
End: 2019-10-25

## 2019-10-25 VITALS
SYSTOLIC BLOOD PRESSURE: 120 MMHG | WEIGHT: 163.6 LBS | OXYGEN SATURATION: 96 % | HEIGHT: 66 IN | RESPIRATION RATE: 16 BRPM | BODY MASS INDEX: 26.29 KG/M2 | TEMPERATURE: 98.4 F | DIASTOLIC BLOOD PRESSURE: 82 MMHG | HEART RATE: 71 BPM

## 2019-10-25 DIAGNOSIS — H61.21 IMPACTED CERUMEN OF RIGHT EAR: ICD-10-CM

## 2019-10-25 DIAGNOSIS — R26.81 UNSTEADY GAIT: Primary | ICD-10-CM

## 2019-10-25 DIAGNOSIS — F71 MODERATE INTELLECTUAL DISABILITY: ICD-10-CM

## 2019-10-25 LAB
BILIRUB UR QL STRIP: NEGATIVE
GLUCOSE UR-MCNC: NEGATIVE MG/DL
KETONES P FAST UR STRIP-MCNC: NEGATIVE MG/DL
PH UR STRIP: 7 [PH] (ref 4.6–8)
PROT UR QL STRIP: NEGATIVE
SP GR UR STRIP: 1.01 (ref 1–1.03)
UA UROBILINOGEN AMB POC: NORMAL (ref 0.2–1)
URINALYSIS CLARITY POC: CLEAR
URINALYSIS COLOR POC: YELLOW
URINE BLOOD POC: NEGATIVE
URINE LEUKOCYTES POC: NEGATIVE
URINE NITRITES POC: NEGATIVE

## 2019-10-25 NOTE — PROGRESS NOTES
Subjective:     Chief Complaint   Patient presents with    Dizziness     x3 mos, once a month, currently on day 2 of episode after he fell yesterday- not known if he hit his head. He  is a 59y.o. year old male with  medical history is significant for moderate mental retardation, non verbal, HLD, asthma, urinary incontinence, baseline unsteady gait is here with his group home attendance with a concern about possibly having dizziness. Reports that yesterday the staff member notice him walking unsteady more than his baseline. He almost fell down yesterday but never hit his head according to the caregiver knowledge. No facial asymmetry or any extremity week ness noted. No other abnormality noted by the caregiver. Patient is non verbal , unable to obtain ROS. Review of systems not obtained due to patient factors. Objective:     Vitals:    10/25/19 1147   BP: 120/82   Pulse: 71   Resp: 16   Temp: 98.4 °F (36.9 °C)   TempSrc: Oral   SpO2: 96%   Weight: 163 lb 9.6 oz (74.2 kg)   Height: 5' 6\" (1.676 m)       Physical Examination: General appearance - alert, well appearing, and in no distress, oriented to person and overweight  Mental status - alert, oriented to person,   normal mood, behavior, non verbal. Nonrmal dress. Eyes - pupils equal and reactive, extraocular eye movements intact  Ears - left ear with tube. No infection noted. Right ear is deeply impacted with wax . Mouth - mucous membranes moist, pharynx normal without lesions  Neck - supple, no significant adenopathy  Chest - clear to auscultation, no wheezes, rales or rhonchi, symmetric air entry  Heart - normal rate, regular rhythm, normal S1, S2, no murmurs, rubs, clicks or gallops  Abdomen - soft, nontender, nondistended, no masses or organomegaly  Neurological - alert, oriented, non verbal.  No facial asymmetry  Unsteady gait when he gets up from chair and then once he start walking gait is okay. Normal reflexes.  Unable to perform  the rest exam due to patient factor. No Known Allergies   Social History     Socioeconomic History    Marital status: UNKNOWN     Spouse name: Not on file    Number of children: Not on file    Years of education: Not on file    Highest education level: Not on file   Tobacco Use    Smoking status: Never Smoker    Smokeless tobacco: Never Used   Substance and Sexual Activity    Alcohol use: No    Drug use: No      Family History   Family history unknown: Yes      Past Surgical History:   Procedure Laterality Date    COLONOSCOPY,DIAGNOSTIC  11/25/2014         [place] EGD TRANSORAL BIOPSY SINGLE/MULTIPLE  8/1/2013           Past Medical History:   Diagnosis Date    Other ill-defined conditions(799.89)     nonverbal,uses sign language some,mental retardation    Other ill-defined conditions(799.89)     elevated cholesterol    Psychiatric disorder     MR,depression anxiety      Current Outpatient Medications   Medication Sig Dispense Refill    ANTI-DIARRHEAL, LOPERAMIDE, 2 mg tablet TAKE (1) TABLET BY MOUTH TWICE A DAY AS NEEDED FOR DIARRHEA 12 Tab 0    atorvastatin (LIPITOR) 10 mg tablet TAKE 1 TABLET BY MOUTH ONCE DAILY IN THE EVENING 30 Tab 3    therapeutic multivitamin (THERAGRAN) tablet TAKE 1 TABLET BY MOUTH DAILY 31 Tab 11    hydrocortisone (HYTONE) 2.5 % ointment APPLY TO AFFECTED AREA ON FACE NIGHTLY AS NEEDED FOR SEBORRHEIC DERMATITIS FLARE UPS 28.35 g 2    finasteride (PROSCAR) 5 mg tablet TAKE 1 TABLET DAILY *SPECIAL HANDLING OF TABLETS* 30 Tab 6    DENTA 5000 PLUS 1.1 % crea BRUSH TEETH TWICE A DAY DO NOT EAT OR DRINK FOR 30 MINUTES AFTER USING 102 g 0    MAPAP EXTRA STRENGTH 500 mg tablet TAKE 1 TABLET EVERY 6 HOURS AS NEEDED FOR HEADACHE OR FEVER (NOT TO EXCEED 06 TABS/DAY) 60 Tab 11    omeprazole (PRILOSEC) 20 mg capsule Take 1 Cap by mouth daily as needed. 30 Cap 2    buPROPion XL (WELLBUTRIN XL) 300 mg XL tablet Take 1 Tab by mouth every morning.  30 Tab 3    FLUoxetine (PROZAC) 20 mg capsule Take 1 Cap by mouth daily. 30 Cap 3    OLANZapine (ZYPREXA) 10 mg tablet Take 1 Tab by mouth nightly. 30 Tab 3    albuterol (VENTOLIN HFA) 90 mcg/actuation inhaler Take 2 Puffs by inhalation every four (4) hours as needed for Wheezing.  cetirizine (ZYRTEC) 10 mg tablet Take 10 mg by mouth daily.  fluticasone (FLONASE) 50 mcg/actuation nasal spray 2 Sprays by Both Nostrils route daily.  glycerin-dimethicone (NEUTROGENA SENSITIVE SKN MOIST) lotion Apply  to affected area daily.  azelastine (OPTIVAR) 0.05 % ophthalmic solution Administer 1 Drop to both eyes two (2) times a day. Use in affected eye(s)      fluticasone (FLOVENT HFA) 110 mcg/actuation inhaler Take 2 Puffs by inhalation every twelve (12) hours.  montelukast (SINGULAIR) 10 mg tablet Take 10 mg by mouth nightly. Assessment/ Plan:   Diagnoses and all orders for this visit:    1. Unsteady gait  -     AMB POC URINALYSIS DIP STICK AUTO W/O MICRO is clear. Patient is non verbal and hard to get the full picture of his health condition. Patient has baseline unsteady gait but its worse for the past two days. Impacted wax likely  be the cause of the worsening unsteady gait. Stroke is unlikely according to exam and history but possible. Will be going to ER if they are unable to make appointment with ENT today. 2. Impacted cerumen of right ear        Unable to clean the ear with water spray because of the ear tube he has . Advised to make an appointment with ENT. 3. Moderate intellectual disability           Medication risks/benefits/costs/interactions/alternatives discussed with patient. Advised patient to call back or return to office if symptoms worsen/change/persist. If patient cannot reach us or should anything more severe/urgent arise he/she should proceed directly to the nearest emergency department.   Discussed expected course/resolution/complications of diagnosis in detail with patient. Patient given a written after visit summary which includes her diagnoses, current medications and vitals. Patient expressed understanding with the diagnosis and plan.

## 2019-10-25 NOTE — PROGRESS NOTES
Brittni Proctor is a 59 y.o. male    Chief Complaint   Patient presents with    Dizziness     x3 mos, once a month, currently on day 2 of episode after he fell yesterday- not known if he hit his head. 1. Have you been to the ER, urgent care clinic since your last visit? Hospitalized since your last visit? No    2. Have you seen or consulted any other health care providers outside of the [address] [place] [address] since your last visit? Include any pap smears or colon screening. No    No flowsheet data found.      Health Maintenance Due   Topic Date Due    Pneumococcal 0-64 years (1 of 1 - PPSV23) 02/14/1961    Shingrix Vaccine Age 50> (1 of 2) 02/14/2005    Influenza Age 5 to Adult  08/01/2019

## 2019-11-07 RX ORDER — SODIUM FLUORIDE 1.1 G/100G
GEL, DENTIFRICE ORAL
Qty: 102 G | Refills: 5 | Status: SHIPPED | OUTPATIENT
Start: 2019-11-07 | End: 2020-11-11

## 2019-11-14 ENCOUNTER — HOSPITAL ENCOUNTER (OUTPATIENT)
Dept: CT IMAGING | Age: 64
Discharge: HOME | End: 2019-11-14
Attending: SPECIALIST
Payer: MEDICARE

## 2019-11-14 DIAGNOSIS — F84.0 AUTISTIC DISORDER, RESIDUAL STATE: ICD-10-CM

## 2019-11-14 PROCEDURE — 70480 CT ORBIT/EAR/FOSSA W/O DYE: CPT

## 2019-12-16 RX ORDER — ATORVASTATIN CALCIUM 10 MG/1
TABLET, FILM COATED ORAL
Qty: 30 TAB | Refills: 0 | Status: SHIPPED | OUTPATIENT
Start: 2019-12-16 | End: 2020-01-15 | Stop reason: SDUPTHER

## 2019-12-16 RX ORDER — FINASTERIDE 5 MG/1
TABLET, FILM COATED ORAL
Qty: 30 TAB | Refills: 0 | Status: SHIPPED | OUTPATIENT
Start: 2019-12-16 | End: 2020-01-15 | Stop reason: SDUPTHER

## 2020-01-15 RX ORDER — ATORVASTATIN CALCIUM 10 MG/1
TABLET, FILM COATED ORAL
Qty: 30 TAB | Refills: 3 | Status: SHIPPED | OUTPATIENT
Start: 2020-01-15 | End: 2020-05-15

## 2020-01-15 RX ORDER — FINASTERIDE 5 MG/1
TABLET, FILM COATED ORAL
Qty: 30 TAB | Refills: 5 | Status: SHIPPED | OUTPATIENT
Start: 2020-01-15 | End: 2020-07-14

## 2020-01-15 NOTE — TELEPHONE ENCOUNTER
Pharmacy is requesting refill of Atorvastatin and Finasteride. Please send new RX to [facility].

## 2020-01-17 ENCOUNTER — OFFICE VISIT (OUTPATIENT)
Dept: PRIMARY CARE CLINIC | Age: 65
End: 2020-01-17

## 2020-01-17 VITALS
OXYGEN SATURATION: 95 % | RESPIRATION RATE: 17 BRPM | WEIGHT: 163 LBS | TEMPERATURE: 98.4 F | HEART RATE: 67 BPM | HEIGHT: 66 IN | DIASTOLIC BLOOD PRESSURE: 80 MMHG | SYSTOLIC BLOOD PRESSURE: 120 MMHG | BODY MASS INDEX: 26.2 KG/M2

## 2020-01-17 DIAGNOSIS — F81.89 DEVELOPMENTAL NON-VERBAL DISORDER: ICD-10-CM

## 2020-01-17 DIAGNOSIS — Z79.899 HIGH RISK MEDICATION USE: ICD-10-CM

## 2020-01-17 DIAGNOSIS — N39.41 BENIGN PROSTATIC HYPERPLASIA (BPH) WITH URINARY URGE INCONTINENCE: ICD-10-CM

## 2020-01-17 DIAGNOSIS — N40.1 BENIGN PROSTATIC HYPERPLASIA (BPH) WITH URINARY URGE INCONTINENCE: ICD-10-CM

## 2020-01-17 DIAGNOSIS — E78.5 HYPERLIPIDEMIA, UNSPECIFIED HYPERLIPIDEMIA TYPE: Primary | ICD-10-CM

## 2020-01-17 DIAGNOSIS — Z23 ENCOUNTER FOR IMMUNIZATION: ICD-10-CM

## 2020-01-17 DIAGNOSIS — F71 MODERATE INTELLECTUAL DISABILITY: ICD-10-CM

## 2020-01-17 RX ORDER — OLANZAPINE 5 MG/1
5 TABLET ORAL
COMMUNITY

## 2020-01-17 NOTE — PROGRESS NOTES
Subjective:     Chief Complaint   Patient presents with    Cholesterol Problem     6 mos f/u, fasting        He  is a 59y.o. year old male is significant for moderate mental retardation, non verbal, HLD, asthma, urinary incontinence.   No new concerns today. Doing well.      HLD;  He is taking Lipitor 10 mg daily.     BPH related urinary incontinence: Has been taking Finasteride for urinary incontinence.     Asthma: Well controlled with Flovent. Has been going to ENT for balance and hearing. His gait is better. Reports that psych decreased the dose of  Olanzapine which is helping with his gait. Review of systems not obtained due to patient factors. Objective:     Vitals:    01/17/20 1016   BP: 145/88   Pulse: 67   Resp: 17   Temp: 98.4 °F (36.9 °C)   TempSrc: Oral   SpO2: 95%   Weight: 163 lb (73.9 kg)   Height: 5' 6\" (1.676 m)       Physical Examination: General appearance - alert, well appearing, and in no distress, oriented to person,  and normal appearing weight  Mental status - alert, oriented to person, normal mood, behavior, non verbal, normal dress, motor activity.   Ears - bilateral TM's and external ear canals normal  Mouth - mucous membranes moist, pharynx normal without lesions  Neck - supple, no significant adenopathy  Chest - clear to auscultation, no wheezes, rales or rhonchi, symmetric air entry  Heart - normal rate, regular rhythm, normal S1, S2, no murmurs, rubs, clicks or gallops    No Known Allergies   Social History     Socioeconomic History    Marital status: UNKNOWN     Spouse name: Not on file    Number of children: Not on file    Years of education: Not on file    Highest education level: Not on file   Tobacco Use    Smoking status: Never Smoker    Smokeless tobacco: Never Used   Substance and Sexual Activity    Alcohol use: No    Drug use: No      Family History   Family history unknown: Yes      Past Surgical History:   Procedure Laterality Date    COLONOSCOPY,DIAGNOSTIC 11/25/2014         [place] EGD TRANSORAL BIOPSY SINGLE/MULTIPLE  8/1/2013           Past Medical History:   Diagnosis Date    Other ill-defined conditions(799.89)     nonverbal,uses sign language some,mental retardation    Other ill-defined conditions(799.89)     elevated cholesterol    Psychiatric disorder     MR,depression anxiety      Current Outpatient Medications   Medication Sig Dispense Refill    OLANZapine (ZYPREXA) 5 mg tablet Take 5 mg by mouth nightly.  atorvastatin (LIPITOR) 10 mg tablet TAKE 1 TABLET BY MOUTH ONCE DAILY IN THE EVENING 30 Tab 3    finasteride (PROSCAR) 5 mg tablet TAKE 1 TABLET DAILY 30 Tab 5    DENTA 5000 PLUS 1.1 % crea BRUSH TEETH TWICE A DAY DO NOT EAT OR DRINK FOR 30 MINUTES AFTER USING 102 g 5    ANTI-DIARRHEAL, LOPERAMIDE, 2 mg tablet TAKE (1) TABLET BY MOUTH TWICE A DAY AS NEEDED FOR DIARRHEA 12 Tab 0    therapeutic multivitamin (THERAGRAN) tablet TAKE 1 TABLET BY MOUTH DAILY 31 Tab 11    hydrocortisone (HYTONE) 2.5 % ointment APPLY TO AFFECTED AREA ON FACE NIGHTLY AS NEEDED FOR SEBORRHEIC DERMATITIS FLARE UPS 28.35 g 2    MAPAP EXTRA STRENGTH 500 mg tablet TAKE 1 TABLET EVERY 6 HOURS AS NEEDED FOR HEADACHE OR FEVER (NOT TO EXCEED 06 TABS/DAY) 60 Tab 11    omeprazole (PRILOSEC) 20 mg capsule Take 1 Cap by mouth daily as needed. 30 Cap 2    buPROPion XL (WELLBUTRIN XL) 300 mg XL tablet Take 1 Tab by mouth every morning. 30 Tab 3    FLUoxetine (PROZAC) 20 mg capsule Take 1 Cap by mouth daily. 30 Cap 3    albuterol (VENTOLIN HFA) 90 mcg/actuation inhaler Take 2 Puffs by inhalation every four (4) hours as needed for Wheezing.  cetirizine (ZYRTEC) 10 mg tablet Take 10 mg by mouth daily.  fluticasone (FLONASE) 50 mcg/actuation nasal spray 2 Sprays by Both Nostrils route daily.  glycerin-dimethicone (NEUTROGENA SENSITIVE SKN MOIST) lotion Apply  to affected area daily.       azelastine (OPTIVAR) 0.05 % ophthalmic solution Administer 1 Drop to both eyes two (2) times a day. Use in affected eye(s)      fluticasone (FLOVENT HFA) 110 mcg/actuation inhaler Take 2 Puffs by inhalation every twelve (12) hours.  montelukast (SINGULAIR) 10 mg tablet Take 10 mg by mouth nightly. Assessment/ Plan:   Diagnoses and all orders for this visit:    1. Hyperlipidemia, unspecified hyperlipidemia type  -     LIPID PANEL  -     METABOLIC PANEL, COMPREHENSIVE              Continue Lipitor. 2. Moderate intellectual disability       Stable. 3. Developmental non-verbal disorder      Stable. 4. Benign prostatic hyperplasia (BPH) with urinary urge incontinence      Well controlled with Proscar. 5. High risk medication use  -     METABOLIC PANEL, COMPREHENSIVE    6. Encounter for immunization  -     INFLUENZA VACCINE INACTIVATED (IIV), SUBUNIT, ADJUVANTED, IM           Medication risks/benefits/costs/interactions/alternatives discussed with patient. Advised patient to call back or return to office if symptoms worsen/change/persist. If patient cannot reach us or should anything more severe/urgent arise he/she should proceed directly to the nearest emergency department. Discussed expected course/resolution/complications of diagnosis in detail with patient. Patient given a written after visit summary which includes her diagnoses, current medications and vitals. Patient expressed understanding with the diagnosis and plan. Follow-up and Dispositions    · Return in about 4 months (around 6/1/2020), or if symptoms worsen or fail to improve, for medicare well ness. Maeve Ivey

## 2020-01-17 NOTE — PROGRESS NOTES
Alina Berrios is a 59 y.o. male    Chief Complaint   Patient presents with    Cholesterol Problem     6 mos f/u, fasting       1. Have you been to the ER, urgent care clinic since your last visit? Hospitalized since your last visit? No    2. Have you seen or consulted any other health care providers outside of the [address] [place] [address] since your last visit? Include any pap smears or colon screening. No    No flowsheet data found.      Health Maintenance Due   Topic Date Due    Pneumococcal 0-64 years (1 of 1 - PPSV23) 02/14/1961    Shingrix Vaccine Age 50> (1 of 2) 02/14/2005    Influenza Age 5 to Adult  08/01/2019

## 2020-01-18 LAB
ALBUMIN SERPL-MCNC: 4.6 G/DL (ref 3.6–4.8)
ALBUMIN/GLOB SERPL: 1.8 {RATIO} (ref 1.2–2.2)
ALP SERPL-CCNC: 96 IU/L (ref 39–117)
ALT SERPL-CCNC: 11 IU/L (ref 0–44)
AST SERPL-CCNC: 14 IU/L (ref 0–40)
BILIRUB SERPL-MCNC: 0.4 MG/DL (ref 0–1.2)
BUN SERPL-MCNC: 13 MG/DL (ref 8–27)
BUN/CREAT SERPL: 12 (ref 10–24)
CALCIUM SERPL-MCNC: 9.1 MG/DL (ref 8.6–10.2)
CHLORIDE SERPL-SCNC: 103 MMOL/L (ref 96–106)
CHOLEST SERPL-MCNC: 129 MG/DL (ref 100–199)
CO2 SERPL-SCNC: 25 MMOL/L (ref 20–29)
CREAT SERPL-MCNC: 1.08 MG/DL (ref 0.76–1.27)
GLOBULIN SER CALC-MCNC: 2.6 G/DL (ref 1.5–4.5)
GLUCOSE SERPL-MCNC: 87 MG/DL (ref 65–99)
HDLC SERPL-MCNC: 39 MG/DL
LDLC SERPL CALC-MCNC: 73 MG/DL (ref 0–99)
POTASSIUM SERPL-SCNC: 4.4 MMOL/L (ref 3.5–5.2)
PROT SERPL-MCNC: 7.2 G/DL (ref 6–8.5)
SODIUM SERPL-SCNC: 142 MMOL/L (ref 134–144)
TRIGL SERPL-MCNC: 84 MG/DL (ref 0–149)
VLDLC SERPL CALC-MCNC: 17 MG/DL (ref 5–40)

## 2020-07-14 RX ORDER — FINASTERIDE 5 MG/1
TABLET, FILM COATED ORAL
Qty: 31 TAB | Refills: 11 | Status: SHIPPED | OUTPATIENT
Start: 2020-07-14 | End: 2021-06-15

## 2020-07-14 RX ORDER — THERA TABS 400 MCG
TAB ORAL
Qty: 31 TAB | Refills: 11 | Status: SHIPPED | OUTPATIENT
Start: 2020-07-14 | End: 2021-06-15

## 2020-07-27 ENCOUNTER — OFFICE VISIT (OUTPATIENT)
Dept: PRIMARY CARE CLINIC | Age: 65
End: 2020-07-27

## 2020-07-27 VITALS
HEIGHT: 66 IN | TEMPERATURE: 97.1 F | DIASTOLIC BLOOD PRESSURE: 89 MMHG | HEART RATE: 70 BPM | OXYGEN SATURATION: 96 % | WEIGHT: 152 LBS | RESPIRATION RATE: 16 BRPM | BODY MASS INDEX: 24.43 KG/M2 | SYSTOLIC BLOOD PRESSURE: 155 MMHG

## 2020-07-27 DIAGNOSIS — F71 MODERATE INTELLECTUAL DISABILITY: ICD-10-CM

## 2020-07-27 DIAGNOSIS — Z79.899 HIGH RISK MEDICATION USE: ICD-10-CM

## 2020-07-27 DIAGNOSIS — Z00.00 MEDICARE ANNUAL WELLNESS VISIT, SUBSEQUENT: Primary | ICD-10-CM

## 2020-07-27 DIAGNOSIS — E78.5 HYPERLIPIDEMIA, UNSPECIFIED HYPERLIPIDEMIA TYPE: ICD-10-CM

## 2020-07-27 DIAGNOSIS — Z11.1 SCREENING-PULMONARY TB: ICD-10-CM

## 2020-07-27 DIAGNOSIS — Z12.5 SPECIAL SCREENING FOR MALIGNANT NEOPLASM OF PROSTATE: ICD-10-CM

## 2020-07-27 NOTE — PROGRESS NOTES
Chief Complaint   Patient presents with   [facility] Annual Wellness Visit     Patient presents for his annual wellness visit

## 2020-07-27 NOTE — PROGRESS NOTES
This is the Subsequent Medicare Annual Wellness Exam, performed 12 months or more after the Initial AWV or the last Subsequent AWV    I have reviewed the patient's medical history in detail and updated the computerized patient record. He  is a 72y.o. year old male is significant for moderate mental retardation, non verbal, HLD, asthma, urinary incontinence. He is here for medicare well ness and follow up. No new concerns today. Doing well.      HLD:  He is taking Lipitor 10 mg daily. Complaint.      BPH related urinary incontinence: Has been taking Finasteride for urinary incontinence. Well controlled.     Asthma: Well controlled with Flovent, singulair. Has been following up with allergy specialist.    Psych: Managed by psychiatrist.        Review of systems not obtained due to patient factors.     History     Patient Active Problem List   Diagnosis Code    Moderate intellectual disability F71    Developmental non-verbal disorder F81.89    Hyperlipidemia E78.5    Mild intermittent asthma without complication C02.82    Benign prostatic hyperplasia (BPH) with urinary urge incontinence N40.1, N39.41    Unsteady gait R26.81     Past Medical History:   Diagnosis Date    Other ill-defined conditions(799.89)     nonverbal,uses sign language some,mental retardation    Other ill-defined conditions(799.89)     elevated cholesterol    Psychiatric disorder     MR,depression anxiety      Past Surgical History:   Procedure Laterality Date    COLONOSCOPY,DIAGNOSTIC  11/25/2014         [place] EGD TRANSORAL BIOPSY SINGLE/MULTIPLE  8/1/2013          Current Outpatient Medications   Medication Sig Dispense Refill    therapeutic multivitamin (THERAGRAN) tablet TAKE 1 TABLET BY MOUTH DAILY 31 Tab 11    finasteride (PROSCAR) 5 mg tablet TAKE 1 TABLET DAILY *SPECIAL HANDLING OF TABLETS* 31 Tab 11    Mapap Extra Strength 500 mg tablet TAKE 1 TABLET EVERY 6 HOURS AS NEEDED FOR HEADACHE OR FEVER (NOT TO EXCEED 06 TABS/DAY) 60 Tab 2    DENTA 5000 PLUS 1.1 % crea BRUSH TEETH TWICE A DAY DO NOT EAT OR DRINK FOR 30 MINUTES AFTER USING 102 g 5    ANTI-DIARRHEAL, LOPERAMIDE, 2 mg tablet TAKE (1) TABLET BY MOUTH TWICE A DAY AS NEEDED FOR DIARRHEA 12 Tab 0    hydrocortisone (HYTONE) 2.5 % ointment APPLY TO AFFECTED AREA ON FACE NIGHTLY AS NEEDED FOR SEBORRHEIC DERMATITIS FLARE UPS 28.35 g 2    omeprazole (PRILOSEC) 20 mg capsule Take 1 Cap by mouth daily as needed. 30 Cap 2    buPROPion XL (WELLBUTRIN XL) 300 mg XL tablet Take 1 Tab by mouth every morning. 30 Tab 3    FLUoxetine (PROZAC) 20 mg capsule Take 1 Cap by mouth daily. 30 Cap 3    albuterol (VENTOLIN HFA) 90 mcg/actuation inhaler Take 2 Puffs by inhalation every four (4) hours as needed for Wheezing.  cetirizine (ZYRTEC) 10 mg tablet Take 10 mg by mouth daily.  azelastine (OPTIVAR) 0.05 % ophthalmic solution Administer 1 Drop to both eyes two (2) times a day. Use in affected eye(s)      fluticasone (FLOVENT HFA) 110 mcg/actuation inhaler Take 2 Puffs by inhalation every twelve (12) hours.  atorvastatin (LIPITOR) 10 mg tablet TAKE 1 TABLET BY MOUTH ONCE DAILY IN THE EVENING 31 Tab 2    Neutrogena Sensitive Skn Moist lotion APPLY TO FACE DAILY 118 mL 3    OLANZapine (ZYPREXA) 5 mg tablet Take 5 mg by mouth nightly.  fluticasone (FLONASE) 50 mcg/actuation nasal spray 2 Sprays by Both Nostrils route daily.  montelukast (SINGULAIR) 10 mg tablet Take 10 mg by mouth nightly.        No Known Allergies    Family History   Family history unknown: Yes     Social History     Tobacco Use    Smoking status: Never Smoker    Smokeless tobacco: Never Used   Substance Use Topics    Alcohol use: No       Depression Risk Factor Screening:     3 most recent PHQ Screens 7/27/2020   Little interest or pleasure in doing things Not at all   Feeling down, depressed, irritable, or hopeless Not at all   Total Score PHQ 2 0       Alcohol Risk Factor Screening (MALE > 65): Do you average more 1 drink per night or more than 7 drinks a week: No    In the past three months have you have had more than 4 drinks containing alcohol on one occasion: No      Functional Ability and Level of Safety:   Hearing: Hearing is good. Patient responds to command. Activities of Daily Living: The home contains: grab bars and rugs  Patient needs help with:  transportation, shopping, preparing meals, laundry, housework, managing medications and managing money     Ambulation: with no difficulty     Fall Risk:  Fall Risk Assessment, last 12 mths 5/30/2019   Able to walk? Yes   Fall in past 12 months? No     Abuse Screen:  unable to review. no signs of abuse. General: stated age, well developed, well nourished and in NAD. Neck: supple, symmetrical, trachea midline, no adenopathy and thyroid: not enlarged, symmetric, no tenderness/mass/nodules  Lungs:  clear to auscultation w/o rales, rhonchi, wheezes w/normal effort and no use of accessory muscles of respiration. Ear: ear tube in left ear. Heart: regular rate and rhythm, S1, S2 normal, no murmur, click, rub or gallop  Abdomen: soft,  no masses, BS normal. Mild tenderness noted around epigastric area. Advised caregiver to follow up if needed. Ext:  No edema noted.    Lymph: no cervical adenopathy appreciated  Skin:  Normal. and no rash or abnormalities   Psych: alert and oriented to person,  Speech: non verbal.      Cognitive Screening   Has your family/caregiver stated any concerns about your memory: N/A     Cognitive Screening: Abnormal -     Patient Care Team   Patient Care Team:  Gini Rivera MD as PCP - General (Family Medicine)  Gini Rivera MD as PCP - [address] [place] [address]  [facility] Provider  Shanna Rain RN (Psychiatry)  Adriano Eugene MD (Allergy)    Assessment/Plan   Education and counseling provided:  Are appropriate based on today's review and evaluation  End-of-Life planning (with patient's consent)- advised care giver  Prostate cancer screening tests (PSA, covered annually)  Screening for glaucoma- yearly follow up advised. Diagnoses and all orders for this visit:    1. Medicare annual wellness visit, subsequent    2. Hyperlipidemia, unspecified hyperlipidemia type  -     LIPID PANEL  -     METABOLIC PANEL, COMPREHENSIVE               Continue Lipitor. 3. Moderate intellectual disability      Stable. Per psychiatrist.  4. High risk medication use  -     CBC WITH AUTOMATED DIFF    5. Special screening for malignant neoplasm of prostate  -     PSA SCREENING (SCREENING)    6. Screening-pulmonary TB  -     QUANTIFERON-TB GOLD PLUS    BP is slightly elevated in office. Continue Monitor. Health Maintenance Due   Topic Date Due    Shingrix Vaccine Age 49> (1 of 2) 02/14/2005    GLAUCOMA SCREENING Q2Y  02/14/2020    Pneumococcal 65+ years (1 of 1 - PPSV23) 02/14/2020     Follow-up and Dispositions    · Return in about 6 months (around 1/27/2021), or if symptoms worsen or fail to improve, for chronic condition. Triin Isabel

## 2020-07-27 NOTE — PATIENT INSTRUCTIONS
Medicare Wellness Visit, Male The best way to live healthy is to have a lifestyle where you eat a well-balanced diet, exercise regularly, limit alcohol use, and quit all forms of tobacco/nicotine, if applicable. Regular preventive services are another way to keep healthy. Preventive services (vaccines, screening tests, monitoring & exams) can help personalize your care plan, which helps you manage your own care. Screening tests can find health problems at the earliest stages, when they are easiest to treat. [facility] follows the current, evidence-based guidelines published by the [facility] Russ Prince ([facility]) when recommending preventive services for our patients. Because we follow these guidelines, sometimes recommendations change over time as research supports it. (For example, a prostate screening blood test is no longer routinely recommended for men with no symptoms). Of course, you and your doctor may decide to screen more often for some diseases, based on your risk and co-morbidities (chronic disease you are already diagnosed with). Preventive services for you include: - Medicare offers their members a free annual wellness visit, which is time for you and your primary care provider to discuss and plan for your preventive service needs. Take advantage of this benefit every year! 
-All adults over age 72 should receive the recommended pneumonia vaccines. Current USPSTF guidelines recommend a series of two vaccines for the best pneumonia protection.  
-All adults should have a flu vaccine yearly and tetanus vaccine every 10 years. 
-All adults age 48 and older should receive the shingles vaccines (series of two vaccines).       
-All adults age 38-68 who are overweight should have a diabetes screening test once every three years.  
-Other screening tests & preventive services for persons with diabetes include: an eye exam to screen for diabetic retinopathy, a kidney function test, a foot exam, and stricter control over your cholesterol.  
-Cardiovascular screening for adults with routine risk involves an electrocardiogram (ECG) at intervals determined by the provider.  
-Colorectal cancer screening should be done for adults age 54-65 with no increased risk factors for colorectal cancer. There are a number of acceptable methods of screening for this type of cancer. Each test has its own benefits and drawbacks. Discuss with your provider what is most appropriate for you during your annual wellness visit. The different tests include: colonoscopy (considered the best screening method), a fecal occult blood test, a fecal DNA test, and sigmoidoscopy. 
-All adults born between [facility] should be screened once for Hepatitis C. 
-An Abdominal Aortic Aneurysm (AAA) Screening is recommended for men age 73-68 who has ever smoked in their lifetime. Here is a list of your current Health Maintenance items (your personalized list of preventive services) with a due date: 
Health Maintenance Due Topic Date Due  Shingles Vaccine (1 of 2) 02/14/2005  Glaucoma Screening   02/14/2020  Pneumococcal Vaccine (1 of 1 - PPSV23) 02/14/2020 [facility] Annual Well Visit  05/30/2020

## 2020-07-30 LAB
ALBUMIN SERPL-MCNC: 4.5 G/DL (ref 3.8–4.8)
ALBUMIN/GLOB SERPL: 1.8 {RATIO} (ref 1.2–2.2)
ALP SERPL-CCNC: 90 IU/L (ref 39–117)
ALT SERPL-CCNC: 15 IU/L (ref 0–44)
AST SERPL-CCNC: 17 IU/L (ref 0–40)
BASOPHILS # BLD AUTO: 0 X10E3/UL (ref 0–0.2)
BASOPHILS NFR BLD AUTO: 0 %
BILIRUB SERPL-MCNC: 0.4 MG/DL (ref 0–1.2)
BUN SERPL-MCNC: 17 MG/DL (ref 8–27)
BUN/CREAT SERPL: 17 (ref 10–24)
CALCIUM SERPL-MCNC: 9.4 MG/DL (ref 8.6–10.2)
CHLORIDE SERPL-SCNC: 104 MMOL/L (ref 96–106)
CHOLEST SERPL-MCNC: 124 MG/DL (ref 100–199)
CO2 SERPL-SCNC: 25 MMOL/L (ref 20–29)
CREAT SERPL-MCNC: 1.01 MG/DL (ref 0.76–1.27)
EOSINOPHIL # BLD AUTO: 0.1 X10E3/UL (ref 0–0.4)
EOSINOPHIL NFR BLD AUTO: 1 %
ERYTHROCYTE [DISTWIDTH] IN BLOOD BY AUTOMATED COUNT: 12.8 % (ref 11.6–15.4)
GAMMA INTERFERON BACKGROUND BLD IA-ACNC: 0.12 IU/ML
GLOBULIN SER CALC-MCNC: 2.5 G/DL (ref 1.5–4.5)
GLUCOSE SERPL-MCNC: 77 MG/DL (ref 65–99)
HCT VFR BLD AUTO: 45.4 % (ref 37.5–51)
HDLC SERPL-MCNC: 42 MG/DL
HGB BLD-MCNC: 15 G/DL (ref 13–17.7)
IMM GRANULOCYTES # BLD AUTO: 0 X10E3/UL (ref 0–0.1)
IMM GRANULOCYTES NFR BLD AUTO: 0 %
LDLC SERPL CALC-MCNC: 64 MG/DL (ref 0–99)
LYMPHOCYTES # BLD AUTO: 1.9 X10E3/UL (ref 0.7–3.1)
LYMPHOCYTES NFR BLD AUTO: 28 %
M TB IFN-G BLD-IMP: NEGATIVE
M TB IFN-G CD4+ BCKGRND COR BLD-ACNC: 0.02 IU/ML
MCH RBC QN AUTO: 31.4 PG (ref 26.6–33)
MCHC RBC AUTO-ENTMCNC: 33 G/DL (ref 31.5–35.7)
MCV RBC AUTO: 95 FL (ref 79–97)
MITOGEN IGNF BLD-ACNC: >10 IU/ML
MONOCYTES # BLD AUTO: 0.5 X10E3/UL (ref 0.1–0.9)
MONOCYTES NFR BLD AUTO: 8 %
NEUTROPHILS # BLD AUTO: 4.3 X10E3/UL (ref 1.4–7)
NEUTROPHILS NFR BLD AUTO: 63 %
PLATELET # BLD AUTO: 157 X10E3/UL (ref 150–450)
POTASSIUM SERPL-SCNC: 4.1 MMOL/L (ref 3.5–5.2)
PROT SERPL-MCNC: 7 G/DL (ref 6–8.5)
PSA SERPL-MCNC: 0.2 NG/ML (ref 0–4)
QUANTIFERON INCUBATION, QF1T: NORMAL
QUANTIFERON TB2 AG: 0.02 IU/ML
RBC # BLD AUTO: 4.78 X10E6/UL (ref 4.14–5.8)
SERVICE CMNT-IMP: NORMAL
SODIUM SERPL-SCNC: 145 MMOL/L (ref 134–144)
TRIGL SERPL-MCNC: 92 MG/DL (ref 0–149)
VLDLC SERPL CALC-MCNC: 18 MG/DL (ref 5–40)
WBC # BLD AUTO: 6.8 X10E3/UL (ref 3.4–10.8)

## 2020-07-30 NOTE — PROGRESS NOTES
Please mail letter:    CBC, kidney, liver, PSA level, cholesterol level is in normal range. TB test is negative.

## 2020-08-05 RX ORDER — CALCIUM CARBONATE 500(1250)
TABLET ORAL
Qty: 12 TAB | Refills: 0 | Status: SHIPPED | OUTPATIENT
Start: 2020-08-05 | End: 2021-03-11

## 2020-09-09 RX ORDER — OMEPRAZOLE 20 MG/1
CAPSULE, DELAYED RELEASE ORAL
Qty: 30 CAP | Status: SHIPPED | OUTPATIENT
Start: 2020-09-09 | End: 2022-08-08 | Stop reason: SDUPTHER

## 2020-09-15 RX ORDER — ATORVASTATIN CALCIUM 10 MG/1
TABLET, FILM COATED ORAL
Qty: 30 TAB | Refills: 11 | Status: SHIPPED | OUTPATIENT
Start: 2020-09-15 | End: 2021-07-31 | Stop reason: SDUPTHER

## 2020-11-11 RX ORDER — SODIUM FLUORIDE 1.1 G/100G
GEL, DENTIFRICE ORAL
Qty: 51 G | Refills: 11 | Status: SHIPPED | OUTPATIENT
Start: 2020-11-11 | End: 2021-11-24

## 2020-11-13 ENCOUNTER — TELEPHONE (OUTPATIENT)
Dept: PRIMARY CARE CLINIC | Age: 65
End: 2020-11-13

## 2020-11-13 NOTE — TELEPHONE ENCOUNTER
Consulted with Carmencita Damon NP who advised that patient get flu shot now then rather wait til next year.  Returned call, no answer, left message

## 2020-11-13 NOTE — TELEPHONE ENCOUNTER
----- Message from Tammy Thomas sent at 11/13/2020  2:52 PM EST -----  Regarding: Dr. John Major Message/Vendor Calls    Caller's first and last name: John Ibrahim      Reason for call: Questions      Callback required yes/no and why: yes to confirm      Best contact number(s): [phone]       Details to clarify the request: pt had flu shot at the beginning of the year and they would like to know if he should get a 2nd flu shot or wait until February.       Tammy Thomas

## 2020-12-02 NOTE — PERIOP NOTES
PATIENT'S CARE-TAKER, PIPE GRUBER,  CALLED AND MADE AWARE OF COVID-19 TESTING REQUIRED TO BE DONE WITHIN 96 HOURS OF SURGERY. COVID-19 TESTING APPOINTMENT MADE FOR PATIENT. PIPE INSTRUCTED ON SELF QUARANTINE BETWEEN TESTING AND ARRIVAL TIME DAY OF SURGERY. INSTRUCTED NOT TO USE NASAL SPRAY OR NASAL OINTMENTS DAY OF TESTING, PRIOR TO TEST. LOCATION OF TESTING DISCUSSED WITH PIPE. PATIENT RESIDES IN A GROUP [facility].

## 2020-12-08 ENCOUNTER — HOSPITAL ENCOUNTER (OUTPATIENT)
Dept: PREADMISSION TESTING | Age: 65
Discharge: HOME | End: 2020-12-08
Payer: MEDICARE

## 2020-12-08 ENCOUNTER — HOSPITAL ENCOUNTER (OUTPATIENT)
Dept: GENERAL RADIOLOGY | Age: 65
Discharge: HOME | End: 2020-12-08
Attending: SPECIALIST
Payer: MEDICARE

## 2020-12-08 VITALS
WEIGHT: 148.15 LBS | HEART RATE: 76 BPM | RESPIRATION RATE: 18 BRPM | TEMPERATURE: 98 F | HEIGHT: 68 IN | BODY MASS INDEX: 22.45 KG/M2 | SYSTOLIC BLOOD PRESSURE: 118 MMHG | DIASTOLIC BLOOD PRESSURE: 83 MMHG

## 2020-12-08 LAB
ALBUMIN SERPL-MCNC: 3.7 G/DL (ref 3.5–5)
ALBUMIN/GLOB SERPL: 1.1 {RATIO} (ref 1.1–2.2)
ALP SERPL-CCNC: 104 U/L (ref 45–117)
ALT SERPL-CCNC: 17 U/L (ref 12–78)
ANION GAP SERPL CALC-SCNC: 3 MMOL/L (ref 5–15)
AST SERPL-CCNC: 12 U/L (ref 15–37)
BASOPHILS # BLD: 0 K/UL (ref 0–0.1)
BASOPHILS NFR BLD: 0 % (ref 0–1)
BILIRUB SERPL-MCNC: 0.5 MG/DL (ref 0.2–1)
BUN SERPL-MCNC: 15 MG/DL (ref 6–20)
BUN/CREAT SERPL: 15 (ref 12–20)
CALCIUM SERPL-MCNC: 8.8 MG/DL (ref 8.5–10.1)
CHLORIDE SERPL-SCNC: 108 MMOL/L (ref 97–108)
CO2 SERPL-SCNC: 31 MMOL/L (ref 21–32)
CREAT SERPL-MCNC: 1.03 MG/DL (ref 0.7–1.3)
DIFFERENTIAL METHOD BLD: NORMAL
EOSINOPHIL # BLD: 0.1 K/UL (ref 0–0.4)
EOSINOPHIL NFR BLD: 2 % (ref 0–7)
ERYTHROCYTE [DISTWIDTH] IN BLOOD BY AUTOMATED COUNT: 12.2 % (ref 11.5–14.5)
GLOBULIN SER CALC-MCNC: 3.4 G/DL (ref 2–4)
GLUCOSE SERPL-MCNC: 88 MG/DL (ref 65–100)
HCT VFR BLD AUTO: 41.8 % (ref 36.6–50.3)
HGB BLD-MCNC: 14.3 G/DL (ref 12.1–17)
IMM GRANULOCYTES # BLD AUTO: 0 K/UL (ref 0–0.04)
IMM GRANULOCYTES NFR BLD AUTO: 0 % (ref 0–0.5)
LYMPHOCYTES # BLD: 1.9 K/UL (ref 0.8–3.5)
LYMPHOCYTES NFR BLD: 37 % (ref 12–49)
MCH RBC QN AUTO: 31.6 PG (ref 26–34)
MCHC RBC AUTO-ENTMCNC: 34.2 G/DL (ref 30–36.5)
MCV RBC AUTO: 92.5 FL (ref 80–99)
MONOCYTES # BLD: 0.5 K/UL (ref 0–1)
MONOCYTES NFR BLD: 9 % (ref 5–13)
NEUTS SEG # BLD: 2.7 K/UL (ref 1.8–8)
NEUTS SEG NFR BLD: 52 % (ref 32–75)
NRBC # BLD: 0 K/UL (ref 0–0.01)
NRBC BLD-RTO: 0 PER 100 WBC
PLATELET # BLD AUTO: 180 K/UL (ref 150–400)
PMV BLD AUTO: 11.3 FL (ref 8.9–12.9)
POTASSIUM SERPL-SCNC: 4.2 MMOL/L (ref 3.5–5.1)
PROT SERPL-MCNC: 7.1 G/DL (ref 6.4–8.2)
RBC # BLD AUTO: 4.52 M/UL (ref 4.1–5.7)
SODIUM SERPL-SCNC: 142 MMOL/L (ref 136–145)
WBC # BLD AUTO: 5.3 K/UL (ref 4.1–11.1)

## 2020-12-08 PROCEDURE — 93005 ELECTROCARDIOGRAM TRACING: CPT

## 2020-12-08 PROCEDURE — 36415 COLL VENOUS BLD VENIPUNCTURE: CPT

## 2020-12-08 PROCEDURE — 71046 X-RAY EXAM CHEST 2 VIEWS: CPT

## 2020-12-08 PROCEDURE — 80053 COMPREHEN METABOLIC PANEL: CPT

## 2020-12-08 PROCEDURE — 85025 COMPLETE CBC W/AUTO DIFF WBC: CPT

## 2020-12-08 NOTE — PERIOP NOTES
PT ARRIVED IN [facility] WITH CARE GIVER FROM [place] [address], SHE MANAGES THE HOUSE THE PT LIVES IN. SHE PROVIDED INFO ON PT'S MAR AND A \"CLIENT INFORMATION SHEET\" WITH SOME BASIC INFORMATION. PT'S PCP IS IN CC. CAREGIVER STATES THAT PT'S CONSENT FORM FOR SURGERY WAS ALREADY SIGNED AND THAT THE SURGEON'S OFFICE HAS THAT. SHE WAS ASKED TO CHECK WITH THEM TO MAKE SURE THAT ALL PAPER WORK IS FILLED OUT AND PRESENT DOS. PRE OP DIRECTIONS GIVEN TO CARE GIVER.  2 BOTTLES CHG SOAP AND DIRECTIONS GIVEN TO PT'S CARE GIVER. DIRECTIONS TO HAVE CXR COMPLETED GIVEN TO HER AS WELL.

## 2020-12-09 LAB
ATRIAL RATE: 69 BPM
CALCULATED P AXIS, ECG09: 61 DEGREES
CALCULATED R AXIS, ECG10: 67 DEGREES
CALCULATED T AXIS, ECG11: 58 DEGREES
DIAGNOSIS, 93000: NORMAL
P-R INTERVAL, ECG05: 150 MS
Q-T INTERVAL, ECG07: 394 MS
QRS DURATION, ECG06: 86 MS
QTC CALCULATION (BEZET), ECG08: 422 MS
VENTRICULAR RATE, ECG03: 69 BPM

## 2020-12-11 ENCOUNTER — TRANSCRIBE ORDER (OUTPATIENT)
Dept: REGISTRATION | Age: 65
End: 2020-12-11

## 2020-12-11 ENCOUNTER — HOSPITAL ENCOUNTER (OUTPATIENT)
Dept: PREADMISSION TESTING | Age: 65
Discharge: HOME | End: 2020-12-11
Payer: MEDICARE

## 2020-12-11 DIAGNOSIS — Z01.812 PRE-PROCEDURE LAB EXAM: ICD-10-CM

## 2020-12-11 DIAGNOSIS — Z01.812 PRE-PROCEDURE LAB EXAM: Primary | ICD-10-CM

## 2020-12-11 PROCEDURE — 87635 SARS-COV-2 COVID-19 AMP PRB: CPT

## 2020-12-13 LAB — SARS-COV-2, COV2NT: NOT DETECTED

## 2020-12-15 ENCOUNTER — ANESTHESIA (OUTPATIENT)
Dept: SURGERY | Age: 65
End: 2020-12-15
Payer: MEDICARE

## 2020-12-15 ENCOUNTER — HOSPITAL ENCOUNTER (OUTPATIENT)
Age: 65
Setting detail: OUTPATIENT SURGERY
Discharge: HOME | End: 2020-12-15
Attending: SPECIALIST | Admitting: SPECIALIST
Payer: MEDICARE

## 2020-12-15 ENCOUNTER — ANESTHESIA EVENT (OUTPATIENT)
Dept: SURGERY | Age: 65
End: 2020-12-15
Payer: MEDICARE

## 2020-12-15 VITALS
HEART RATE: 75 BPM | HEIGHT: 68 IN | BODY MASS INDEX: 22.45 KG/M2 | SYSTOLIC BLOOD PRESSURE: 119 MMHG | TEMPERATURE: 98.8 F | WEIGHT: 148.13 LBS | RESPIRATION RATE: 18 BRPM | DIASTOLIC BLOOD PRESSURE: 62 MMHG | OXYGEN SATURATION: 94 %

## 2020-12-15 DIAGNOSIS — L76.82 PAIN AT SURGICAL INCISION: Primary | ICD-10-CM

## 2020-12-15 PROCEDURE — L8613 OSSICULAR IMPLANT: HCPCS | Performed by: SPECIALIST

## 2020-12-15 PROCEDURE — 77030031139 HC SUT VCRL2 J&J -A: Performed by: SPECIALIST

## 2020-12-15 PROCEDURE — 77030002888 HC SUT CHRMC J&J -A: Performed by: SPECIALIST

## 2020-12-15 PROCEDURE — 77030006689 HC BLD OPHTH BVR BD -A: Performed by: SPECIALIST

## 2020-12-15 PROCEDURE — 77030040361 HC SLV COMPR DVT MDII -B: Performed by: SPECIALIST

## 2020-12-15 PROCEDURE — 74011250636 HC RX REV CODE- 250/636: Performed by: ANESTHESIOLOGY

## 2020-12-15 PROCEDURE — 77030006932 HC BLD TYMP BVR BD -B: Performed by: SPECIALIST

## 2020-12-15 PROCEDURE — 76210000006 HC OR PH I REC 0.5 TO 1 HR: Performed by: SPECIALIST

## 2020-12-15 PROCEDURE — 77030040922 HC BLNKT HYPOTHRM STRY -A

## 2020-12-15 PROCEDURE — 74011250637 HC RX REV CODE- 250/637: Performed by: ANESTHESIOLOGY

## 2020-12-15 PROCEDURE — 77030014006 HC SPNG HEMSTAT J&J -A: Performed by: SPECIALIST

## 2020-12-15 PROCEDURE — 74011000250 HC RX REV CODE- 250: Performed by: NURSE ANESTHETIST, CERTIFIED REGISTERED

## 2020-12-15 PROCEDURE — 77030026438 HC STYL ET INTUB CARD -A: Performed by: ANESTHESIOLOGY

## 2020-12-15 PROCEDURE — 74011250636 HC RX REV CODE- 250/636: Performed by: NURSE ANESTHETIST, CERTIFIED REGISTERED

## 2020-12-15 PROCEDURE — 77030008570 HC TBNG SUC IRR GRAC -B: Performed by: SPECIALIST

## 2020-12-15 PROCEDURE — 76060000036 HC ANESTHESIA 2.5 TO 3 HR: Performed by: SPECIALIST

## 2020-12-15 PROCEDURE — 76010000132 HC OR TIME 2.5 TO 3 HR: Performed by: SPECIALIST

## 2020-12-15 PROCEDURE — 74011250637 HC RX REV CODE- 250/637

## 2020-12-15 PROCEDURE — 74011000250 HC RX REV CODE- 250: Performed by: SPECIALIST

## 2020-12-15 PROCEDURE — 77030019615 HC ELCTRD EMG NDL MEDT -B: Performed by: SPECIALIST

## 2020-12-15 PROCEDURE — 2709999900 HC NON-CHARGEABLE SUPPLY: Performed by: SPECIALIST

## 2020-12-15 PROCEDURE — 77030008684 HC TU ET CUF COVD -B: Performed by: ANESTHESIOLOGY

## 2020-12-15 PROCEDURE — 74011250636 HC RX REV CODE- 250/636: Performed by: SPECIALIST

## 2020-12-15 PROCEDURE — 74011250637 HC RX REV CODE- 250/637: Performed by: NURSE ANESTHETIST, CERTIFIED REGISTERED

## 2020-12-15 PROCEDURE — 77030002974 HC SUT PLN J&J -A: Performed by: SPECIALIST

## 2020-12-15 PROCEDURE — 77030040356 HC CORD BPLR FRCP COVD -A: Performed by: SPECIALIST

## 2020-12-15 RX ORDER — DIPHENHYDRAMINE HYDROCHLORIDE 50 MG/ML
12.5 INJECTION, SOLUTION INTRAMUSCULAR; INTRAVENOUS AS NEEDED
Status: DISCONTINUED | OUTPATIENT
Start: 2020-12-15 | End: 2020-12-15 | Stop reason: HOSPADM

## 2020-12-15 RX ORDER — EPINEPHRINE 1 MG/ML
INJECTION, SOLUTION, CONCENTRATE INTRAVENOUS AS NEEDED
Status: DISCONTINUED | OUTPATIENT
Start: 2020-12-15 | End: 2020-12-15 | Stop reason: HOSPADM

## 2020-12-15 RX ORDER — MIDAZOLAM HYDROCHLORIDE 1 MG/ML
1 INJECTION, SOLUTION INTRAMUSCULAR; INTRAVENOUS AS NEEDED
Status: DISCONTINUED | OUTPATIENT
Start: 2020-12-15 | End: 2020-12-15 | Stop reason: HOSPADM

## 2020-12-15 RX ORDER — SODIUM CHLORIDE 0.9 % (FLUSH) 0.9 %
5-40 SYRINGE (ML) INJECTION AS NEEDED
Status: DISCONTINUED | OUTPATIENT
Start: 2020-12-15 | End: 2020-12-15 | Stop reason: HOSPADM

## 2020-12-15 RX ORDER — MORPHINE SULFATE 10 MG/ML
2 INJECTION, SOLUTION INTRAMUSCULAR; INTRAVENOUS
Status: DISCONTINUED | OUTPATIENT
Start: 2020-12-15 | End: 2020-12-15 | Stop reason: HOSPADM

## 2020-12-15 RX ORDER — PROPOFOL 10 MG/ML
INJECTION, EMULSION INTRAVENOUS AS NEEDED
Status: DISCONTINUED | OUTPATIENT
Start: 2020-12-15 | End: 2020-12-15 | Stop reason: HOSPADM

## 2020-12-15 RX ORDER — BACITRACIN 500 [USP'U]/G
OINTMENT TOPICAL AS NEEDED
Status: DISCONTINUED | OUTPATIENT
Start: 2020-12-15 | End: 2020-12-15 | Stop reason: HOSPADM

## 2020-12-15 RX ORDER — HYDROCODONE BITARTRATE AND ACETAMINOPHEN 5; 325 MG/1; MG/1
1-2 TABLET ORAL
Qty: 20 TAB | Refills: 0 | Status: SHIPPED | OUTPATIENT
Start: 2020-12-15 | End: 2020-12-20

## 2020-12-15 RX ORDER — HYDROCODONE BITARTRATE AND ACETAMINOPHEN 5; 325 MG/1; MG/1
TABLET ORAL
Status: COMPLETED
Start: 2020-12-15 | End: 2020-12-15

## 2020-12-15 RX ORDER — ROCURONIUM BROMIDE 10 MG/ML
INJECTION, SOLUTION INTRAVENOUS AS NEEDED
Status: DISCONTINUED | OUTPATIENT
Start: 2020-12-15 | End: 2020-12-15 | Stop reason: HOSPADM

## 2020-12-15 RX ORDER — DEXAMETHASONE SODIUM PHOSPHATE 10 MG/ML
10 INJECTION INTRAMUSCULAR; INTRAVENOUS ONCE
Status: DISCONTINUED | OUTPATIENT
Start: 2020-12-15 | End: 2020-12-15 | Stop reason: HOSPADM

## 2020-12-15 RX ORDER — FENTANYL CITRATE 50 UG/ML
50 INJECTION, SOLUTION INTRAMUSCULAR; INTRAVENOUS AS NEEDED
Status: DISCONTINUED | OUTPATIENT
Start: 2020-12-15 | End: 2020-12-15 | Stop reason: HOSPADM

## 2020-12-15 RX ORDER — LIDOCAINE HYDROCHLORIDE 10 MG/ML
0.1 INJECTION, SOLUTION EPIDURAL; INFILTRATION; INTRACAUDAL; PERINEURAL AS NEEDED
Status: DISCONTINUED | OUTPATIENT
Start: 2020-12-15 | End: 2020-12-15 | Stop reason: HOSPADM

## 2020-12-15 RX ORDER — SODIUM CHLORIDE, SODIUM LACTATE, POTASSIUM CHLORIDE, CALCIUM CHLORIDE 600; 310; 30; 20 MG/100ML; MG/100ML; MG/100ML; MG/100ML
75 INJECTION, SOLUTION INTRAVENOUS CONTINUOUS
Status: DISCONTINUED | OUTPATIENT
Start: 2020-12-15 | End: 2020-12-15 | Stop reason: HOSPADM

## 2020-12-15 RX ORDER — BACITRACIN 500 [USP'U]/G
OINTMENT TOPICAL
Status: DISCONTINUED | OUTPATIENT
Start: 2020-12-15 | End: 2020-12-15 | Stop reason: HOSPADM

## 2020-12-15 RX ORDER — HYDROMORPHONE HYDROCHLORIDE 1 MG/ML
0.2 INJECTION, SOLUTION INTRAMUSCULAR; INTRAVENOUS; SUBCUTANEOUS
Status: DISCONTINUED | OUTPATIENT
Start: 2020-12-15 | End: 2020-12-15 | Stop reason: HOSPADM

## 2020-12-15 RX ORDER — SODIUM CHLORIDE 0.9 % (FLUSH) 0.9 %
5-40 SYRINGE (ML) INJECTION EVERY 8 HOURS
Status: DISCONTINUED | OUTPATIENT
Start: 2020-12-15 | End: 2020-12-15 | Stop reason: HOSPADM

## 2020-12-15 RX ORDER — FENTANYL CITRATE 50 UG/ML
25 INJECTION, SOLUTION INTRAMUSCULAR; INTRAVENOUS
Status: DISCONTINUED | OUTPATIENT
Start: 2020-12-15 | End: 2020-12-15 | Stop reason: HOSPADM

## 2020-12-15 RX ORDER — FENTANYL CITRATE 50 UG/ML
INJECTION, SOLUTION INTRAMUSCULAR; INTRAVENOUS AS NEEDED
Status: DISCONTINUED | OUTPATIENT
Start: 2020-12-15 | End: 2020-12-15 | Stop reason: HOSPADM

## 2020-12-15 RX ORDER — SODIUM CHLORIDE 9 MG/ML
25 INJECTION, SOLUTION INTRAVENOUS CONTINUOUS
Status: DISCONTINUED | OUTPATIENT
Start: 2020-12-15 | End: 2020-12-15 | Stop reason: HOSPADM

## 2020-12-15 RX ORDER — MIDAZOLAM HYDROCHLORIDE 1 MG/ML
0.5 INJECTION, SOLUTION INTRAMUSCULAR; INTRAVENOUS
Status: DISCONTINUED | OUTPATIENT
Start: 2020-12-15 | End: 2020-12-15 | Stop reason: HOSPADM

## 2020-12-15 RX ORDER — SCOLOPAMINE TRANSDERMAL SYSTEM 1 MG/1
PATCH, EXTENDED RELEASE TRANSDERMAL AS NEEDED
Status: DISCONTINUED | OUTPATIENT
Start: 2020-12-15 | End: 2020-12-15 | Stop reason: HOSPADM

## 2020-12-15 RX ORDER — ONDANSETRON 2 MG/ML
INJECTION INTRAMUSCULAR; INTRAVENOUS AS NEEDED
Status: DISCONTINUED | OUTPATIENT
Start: 2020-12-15 | End: 2020-12-15 | Stop reason: HOSPADM

## 2020-12-15 RX ORDER — LIDOCAINE HYDROCHLORIDE 20 MG/ML
INJECTION, SOLUTION EPIDURAL; INFILTRATION; INTRACAUDAL; PERINEURAL AS NEEDED
Status: DISCONTINUED | OUTPATIENT
Start: 2020-12-15 | End: 2020-12-15 | Stop reason: HOSPADM

## 2020-12-15 RX ORDER — SODIUM CHLORIDE, SODIUM LACTATE, POTASSIUM CHLORIDE, CALCIUM CHLORIDE 600; 310; 30; 20 MG/100ML; MG/100ML; MG/100ML; MG/100ML
125 INJECTION, SOLUTION INTRAVENOUS CONTINUOUS
Status: DISCONTINUED | OUTPATIENT
Start: 2020-12-15 | End: 2020-12-15 | Stop reason: HOSPADM

## 2020-12-15 RX ORDER — PHENYLEPHRINE HCL IN 0.9% NACL 0.4MG/10ML
SYRINGE (ML) INTRAVENOUS AS NEEDED
Status: DISCONTINUED | OUTPATIENT
Start: 2020-12-15 | End: 2020-12-15 | Stop reason: HOSPADM

## 2020-12-15 RX ORDER — ROPIVACAINE HYDROCHLORIDE 5 MG/ML
30 INJECTION, SOLUTION EPIDURAL; INFILTRATION; PERINEURAL ONCE
Status: DISCONTINUED | OUTPATIENT
Start: 2020-12-15 | End: 2020-12-15 | Stop reason: HOSPADM

## 2020-12-15 RX ORDER — MIDAZOLAM HYDROCHLORIDE 1 MG/ML
INJECTION, SOLUTION INTRAMUSCULAR; INTRAVENOUS AS NEEDED
Status: DISCONTINUED | OUTPATIENT
Start: 2020-12-15 | End: 2020-12-15 | Stop reason: HOSPADM

## 2020-12-15 RX ORDER — ACETAMINOPHEN 325 MG/1
650 TABLET ORAL ONCE
Status: COMPLETED | OUTPATIENT
Start: 2020-12-15 | End: 2020-12-15

## 2020-12-15 RX ORDER — SUCCINYLCHOLINE CHLORIDE 20 MG/ML
INJECTION INTRAMUSCULAR; INTRAVENOUS AS NEEDED
Status: DISCONTINUED | OUTPATIENT
Start: 2020-12-15 | End: 2020-12-15 | Stop reason: HOSPADM

## 2020-12-15 RX ORDER — AMOXICILLIN AND CLAVULANATE POTASSIUM 875; 125 MG/1; MG/1
1 TABLET, FILM COATED ORAL 2 TIMES DAILY
Qty: 10 TAB | Refills: 0 | Status: SHIPPED | OUTPATIENT
Start: 2020-12-15 | End: 2020-12-20

## 2020-12-15 RX ORDER — ONDANSETRON 2 MG/ML
4 INJECTION INTRAMUSCULAR; INTRAVENOUS AS NEEDED
Status: DISCONTINUED | OUTPATIENT
Start: 2020-12-15 | End: 2020-12-15 | Stop reason: HOSPADM

## 2020-12-15 RX ORDER — DEXAMETHASONE SODIUM PHOSPHATE 4 MG/ML
INJECTION, SOLUTION INTRA-ARTICULAR; INTRALESIONAL; INTRAMUSCULAR; INTRAVENOUS; SOFT TISSUE AS NEEDED
Status: DISCONTINUED | OUTPATIENT
Start: 2020-12-15 | End: 2020-12-15 | Stop reason: HOSPADM

## 2020-12-15 RX ORDER — HYDROCODONE BITARTRATE AND ACETAMINOPHEN 5; 325 MG/1; MG/1
1 TABLET ORAL ONCE
Status: COMPLETED | OUTPATIENT
Start: 2020-12-15 | End: 2020-12-15

## 2020-12-15 RX ORDER — LIDOCAINE HYDROCHLORIDE AND EPINEPHRINE 10; 10 MG/ML; UG/ML
1.5 INJECTION, SOLUTION INFILTRATION; PERINEURAL ONCE
Status: COMPLETED | OUTPATIENT
Start: 2020-12-15 | End: 2020-12-15

## 2020-12-15 RX ADMIN — PHENYLEPHRINE HYDROCHLORIDE 60 MCG/MIN: 10 INJECTION INTRAVENOUS at 08:27

## 2020-12-15 RX ADMIN — HYDROCODONE BITARTRATE AND ACETAMINOPHEN 1 TABLET: 5; 325 TABLET ORAL at 11:51

## 2020-12-15 RX ADMIN — PROPOFOL 50 MG: 10 INJECTION, EMULSION INTRAVENOUS at 08:36

## 2020-12-15 RX ADMIN — SODIUM CHLORIDE, POTASSIUM CHLORIDE, SODIUM LACTATE AND CALCIUM CHLORIDE 125 ML/HR: 600; 310; 30; 20 INJECTION, SOLUTION INTRAVENOUS at 07:00

## 2020-12-15 RX ADMIN — LIDOCAINE HYDROCHLORIDE 50 MG: 20 INJECTION, SOLUTION EPIDURAL; INFILTRATION; INTRACAUDAL; PERINEURAL at 08:19

## 2020-12-15 RX ADMIN — Medication 120 MCG: at 08:27

## 2020-12-15 RX ADMIN — ONDANSETRON 4 MG: 2 INJECTION INTRAMUSCULAR; INTRAVENOUS at 11:51

## 2020-12-15 RX ADMIN — Medication 80 MCG: at 08:32

## 2020-12-15 RX ADMIN — WATER 2 G: 1 INJECTION INTRAMUSCULAR; INTRAVENOUS; SUBCUTANEOUS at 08:28

## 2020-12-15 RX ADMIN — Medication 80 MCG: at 08:30

## 2020-12-15 RX ADMIN — ACETAMINOPHEN 650 MG: 325 TABLET ORAL at 07:05

## 2020-12-15 RX ADMIN — Medication 80 MCG: at 08:23

## 2020-12-15 RX ADMIN — MIDAZOLAM 2 MG: 1 INJECTION INTRAMUSCULAR; INTRAVENOUS at 08:02

## 2020-12-15 RX ADMIN — FENTANYL CITRATE 50 MCG: 50 INJECTION, SOLUTION INTRAMUSCULAR; INTRAVENOUS at 08:19

## 2020-12-15 RX ADMIN — SUCCINYLCHOLINE CHLORIDE 140 MG: 20 INJECTION, SOLUTION INTRAMUSCULAR; INTRAVENOUS at 08:20

## 2020-12-15 RX ADMIN — SCOPALAMINE 1 PATCH: 1 PATCH, EXTENDED RELEASE TRANSDERMAL at 10:29

## 2020-12-15 RX ADMIN — DEXAMETHASONE SODIUM PHOSPHATE 4 MG: 4 INJECTION, SOLUTION INTRAMUSCULAR; INTRAVENOUS at 08:31

## 2020-12-15 RX ADMIN — Medication 80 MCG: at 08:24

## 2020-12-15 RX ADMIN — ONDANSETRON HYDROCHLORIDE 4 MG: 2 INJECTION, SOLUTION INTRAMUSCULAR; INTRAVENOUS at 10:28

## 2020-12-15 RX ADMIN — SODIUM CHLORIDE, POTASSIUM CHLORIDE, SODIUM LACTATE AND CALCIUM CHLORIDE: 600; 310; 30; 20 INJECTION, SOLUTION INTRAVENOUS at 10:00

## 2020-12-15 RX ADMIN — PROPOFOL 150 MG: 10 INJECTION, EMULSION INTRAVENOUS at 08:19

## 2020-12-15 RX ADMIN — ROCURONIUM BROMIDE 5 MG: 10 SOLUTION INTRAVENOUS at 08:19

## 2020-12-15 NOTE — ROUTINE PROCESS
Patient: Lily Lopez MRN: [id number]  SSN: [id number]   YOB: 1955  Age: 72 y.o. Sex: male     Patient is status post Procedure(s):  RIGHT EXPLORATORY TYMPANOPLASTY WITH OSSICULAR CHAIN RECONSTRUCTION POSSIBLE STAPES PLACEMENT OF FACIAL NERVE MONITORING ELECTRODES.     Surgeon(s) and Role:     * Balbina Quintero MD - Primary     * Monica Denton MD - Resident - Assisting    Local/Dose/Irrigation:  1% lidocaine w epine 10ml right ear                  Peripheral IV 12/15/20 Left Arm (Active)            Airway - Endotracheal Tube 12/15/20 Oral (Active)                   Dressing/Packing:

## 2020-12-15 NOTE — BRIEF OP NOTE
Brief Postoperative Note    Patient: Carson Sunshine  YOB: 1955  MRN: [id number]    Date of Procedure: 12/15/2020     Pre-Op Diagnosis: Autistic disorder, residual state [F84.0]  Conductive hearing loss, bilateral [H90.0]  Sensory hearing loss, bilateral [H90.3]    Post-Op Diagnosis: Same as preoperative diagnosis. Procedure(s):  RIGHT EXPLORATOMY TYMPANOPLASTY STAPEDECTOMY, PLACEMENT OF FACIAL NERVE MONITORING ELECTRODES    Surgeon(s):  MD Hodan Armenta MD    Surgical Assistant: None    Anesthesia: General     Estimated Blood Loss (mL): Minimal    Complications: none    Specimens: * No specimens in log *     Implants:   Implant Name Type Inv.  Item Serial No.  Lot No. LRB No. Used Action   smart malleus piston   [id number]  [id number] Right 1 Implanted       Drains: * No LDAs found *    Findings: see op note     Electronically Signed by Cielo Hartley MD on 12/15/2020 at 10:28 AM

## 2020-12-15 NOTE — DISCHARGE INSTRUCTIONS
[facility]  POSTOPERATIVE INSTRUCTIONS  FOR PATIENTS UNDERGOING  CHRONIC EAR, MIDDLE EAR OR STAPES SURGERY    1. Do not blow your nose for three weeks following surgery. If you sneeze or cough do so with your mouth open. 2. Avoid any heavy lifting (over 10 lbs.), straining or bending for three weeks following surgery. 3. Keep your head elevated as much as possible x 48 hours . Sleep and rest on two to three pillows if possible. 4. Do not get water in your ear. If showering or washing your hair place a piece of cotton coated in Vaseline in the ear canal to seal it. If there is a separate incision keep this dry x 48 hours. 5. If you wear glasses either remove the arm on the operated side or make certain that it does not rest on the incision behind your ear for one week. 6. Beginning one day after surgery try to leave the cotton out of our ear as much as possible unless there is significant drainage. 7. Some drainage from your ear canal may occur after surgery. If there is a separate incision some drainage may occur from this area also. If the drainage is profuse or develops a foul odor please call. 8. Popping sounds, a plugged sensation, ringing or fluctuating hearing may be noticed in the ear during the healing. 9. Avoid travel by air for three weeks following surgery. 10. If you should notice any swelling, redness or excessive pain please call. 11. Some dizziness may occur after surgery. If it becomes severe or is associated with nausea or vomiting please call. 12. Please call The [address] [place] [address] [place]. to make an appointment to be seen 7-10 days after the time of your surgery unless stated otherwise by your physician. I understand and acknowledge receipt of the instructions indicated above.                                                                                                                                            Physician's or R.N.'s Signature Date/Time                                                                                                                                           Patient or Representative Signature                                                          Date/Time          [address]  [phone]        ______________________________________________________________________    Anesthesia Discharge Instructions    After general anesthesia or intervenous sedation, for 24 hours or while taking prescription Narcotics:  · Limit your activities  · Do not drive or operate hazardous machinery  · If you have not urinated within 8 hours after discharge, please contact your surgeon on call. · Do not make important personal or business decisions  · Do not drink alcoholic beverages    Report the following to your surgeon:  · Excessive pain, swelling, redness or odor of or around the surgical area  · Temperature over 100.5 degrees  · Nausea and vomiting lasting longer than 4 hours or if unable to take medication  · Any signs of decreased circulation or nerve impairment to extremity:  Change in color, persistent numbness, tingling, coldness or increased pain.   · Any questions

## 2020-12-15 NOTE — ANESTHESIA PREPROCEDURE EVALUATION
Anesthetic History   No history of anesthetic complications            Review of Systems / Medical History  Patient summary reviewed, nursing notes reviewed and pertinent labs reviewed    Pulmonary            Asthma : well controlled       Neuro/Psych         Psychiatric history    Comments: Nonverbal  Mental Retardation  Depression Cardiovascular  Within defined limits                Exercise tolerance: >4 METS     GI/Hepatic/Renal  Within defined limits              Endo/Other  Within defined limits           Other Findings   Comments: Non verbal           Physical Exam    Airway  Mallampati: III  TM Distance: > 6 cm  Neck ROM: normal range of motion   Mouth opening: Normal     Cardiovascular  Regular rate and rhythm,  S1 and S2 normal,  no murmur, click, rub, or gallop             Dental  No notable dental hx       Pulmonary  Breath sounds clear to auscultation               Abdominal  GI exam deferred       Other Findings            Anesthetic Plan    ASA: 2  Anesthesia type: general          Induction: Intravenous  Anesthetic plan and risks discussed with: Patient

## 2020-12-15 NOTE — OP NOTES
[address] [facility]  OPERATIVE REPORT    Name:  Jennifer Castillo  MR#:  [id number]  :  1955  ACCOUNT #:  [de-identified]  DATE OF SERVICE:  12/15/2020      PREOPERATIVE DIAGNOSIS:  Right conductive hearing loss. POSTOPERATIVE DIAGNOSIS:  Right conductive hearing loss. PROCEDURE PERFORMED:  1. Right laser stapedectomy. 2.  Fascial graft through separate incision and separate site. 3.  Facial nerve monitoring x1 hour. 4.  Placement of facial nerve electrodes. 5.  Microdissection. SURGEON:  Carlos Rogers MD    ASSISTANT:  none. ANESTHESIA:  General.    COMPLICATIONS:  None. SPECIMENS REMOVED:  None. IMPLANTS:  Malleus to oval window Smart prosthesis, serial #[id number] and lot #[id number] from the [address] [place] [address]. ESTIMATED BLOOD LOSS:  5 mL. PROCEDURE IN DETAIL:  After the patient's caregiver received informed consent, the patient was taken to the operating room. The patient was kept in the supine position, dressed and draped in the usual fashion. After administration of general anesthesia, the table was turned to 180 degrees away from neutral position. Facial nerve electrodes were placed in the right orbicularis oris and right orbicularis oculi muscles. Continuous facial nerve monitoring took place for the entire case. The baseline facial muscular activity was less than 10 milliamps. There was no aberrant facial nerve stimulation throughout the entire case. The electrode impedances were checked preoperatively and found to be less than 1.0 kiloohms. The operation was begun on the right side. 10 mL of 1% lidocaine with 1:100,000 epinephrine was injected into the right pre and postauricular sites. FINDINGS:  1. Normal tympanic membrane. 2.  Scar tissue surrounding malleus, incus and stapes. 3.  Necrotic long process of the incus in lateral chain fixation:  Incus removed and malleus head removed. 4.  Fixed stapes.   5.  Malleus to oval window prosthesis placed. Using the operating microscope for the entire case, a speculum was placed in the external auditory canal.  Tympanomeatal flap was constructed, raised and middle ear was entered. Upon entering the middle ear, there appeared to be scar tissue surrounding the ossicular chain. This was removed delicately. The ossicular chain was palpated and found to be immobile. There appeared to be lateral chain fixation. The incudostapedial joint was  and there was stapes fixation as well. At this point, the incus was then removed and the malleus head was snipped. The stapes was re-palpated and found to be immobile. Next, using a Diode laser, which was brought to the table on multiple settings, the anterior and posterior kade were then lysed. The stapes bone was then removed. The footplate was palpated and found to be mobile. Next, using the laser, the stapes footplate was then opened further. Measurements were then taken and appeared that a 6.0-mm malleus to oval window prosthesis would be sufficient. This prosthesis was brought to the table, slipped in through the opening of the stapes footplate and secured to the malleus. The tympanomeatal flap was reapproximated. Prior to reapproximating the tympanomeatal flap, a separate incision was made in the temporalis region. Deeper tissues were incised and 1 x 1 cm piece of temporalis fascia was harvested, placed on Butch block, and cut into multiple small pieces. This incision was closed with a fast-absorbing stitch. This fascia graft which was harvested from the postauricular region was then placed in multiple pieces against the fistula that was created with laser to close the hole around the footplate. The tympanomeatal flaps were approximated. The external auditory canal was packed with Gelfoam and bacitracin. All counts were correct at the end of the case.   The patient tolerated the procedure well and went to PACU in stable condition.       MD JENARO [email or web address]  D:  12/15/2020 10:44  T:  12/15/2020 16:36  JOB #:  [id number]

## 2020-12-15 NOTE — H&P
Otolaryngology  History and Physical    Subjective:      Merrill Forde is a 72 y.o. male who presents for Possible Right stapedectomy    Past Medical History:   Diagnosis Date    Asthma     PT ON DAILY INHALERS AND HAS A RESCUE INHALER    Ill-defined condition     MILD MR, AUTISM    Other ill-defined conditions(799.89)     nonverbal,uses sign language some,mental retardation    Other ill-defined conditions(799.89)     elevated cholesterol    Psychiatric disorder     MR,depression anxiety     Past Surgical History:   Procedure Laterality Date    COLONOSCOPY,DIAGNOSTIC  11/25/2014         UT EGD TRANSORAL BIOPSY SINGLE/MULTIPLE  8/1/2013           Family History   Family history unknown: Yes     Social History     Tobacco Use    Smoking status: Never Smoker    Smokeless tobacco: Never Used   Substance Use Topics    Alcohol use: No      Prior to Admission medications    Medication Sig Start Date End Date Taking? Authorizing Provider   Denta 5000 Plus 1.1 % crea BRUSH TEETH TWICE A DAY DO NOT EAT OR DRINK FOR 30 MINUTES AFTER USING 11/11/20  Yes Duke Lloyd MD   atorvastatin (LIPITOR) 10 mg tablet TAKE 1 TABLET BY MOUTH ONCE DAILY IN THE EVENING 9/15/20  Yes Duke Lloyd MD   omeprazole (PRILOSEC) 20 mg capsule TAKE 1 CAPSULE BY MOUTH DAILY AS NEEDED 9/9/20  Yes Duke Lloyd MD   therapeutic multivitamin (THERAGRAN) tablet TAKE 1 TABLET BY MOUTH DAILY 7/14/20  Yes Duke Lloyd MD   Neutrogena Sensitive Skn Moist lotion APPLY TO FACE DAILY 3/24/20  Yes Duke Lloyd MD   OLANZapine (ZYPREXA) 5 mg tablet Take 5 mg by mouth nightly. Yes Provider, Historical   buPROPion XL (WELLBUTRIN XL) 300 mg XL tablet Take 1 Tab by mouth every morning. 8/27/18  Yes Duke Lloyd MD   FLUoxetine (PROZAC) 20 mg capsule Take 1 Cap by mouth daily. 8/27/18  Yes Duke Lloyd MD   cetirizine (ZYRTEC) 10 mg tablet Take 10 mg by mouth daily.    Yes Provider, Historical   fluticasone (FLONASE) 50 mcg/actuation nasal spray 2 Sprays by Both Nostrils route daily. Yes Provider, Historical   azelastine (OPTIVAR) 0.05 % ophthalmic solution Administer 1 Drop to both eyes two (2) times a day. Use in affected eye(s)   Yes Provider, Historical   fluticasone (FLOVENT HFA) 110 mcg/actuation inhaler Take 2 Puffs by inhalation every twelve (12) hours. Yes Provider, Historical   montelukast (SINGULAIR) 10 mg tablet Take 10 mg by mouth nightly. Yes Provider, Historical   Anti-Diarrheal, Loperamide, 2 mg tablet TAKE (1) TABLET BY MOUTH TWICE A DAY AS NEEDED FOR DIARRHEA 20   Duke Lloyd MD   finasteride (PROSCAR) 5 mg tablet TAKE 1 TABLET DAILY *SPECIAL HANDLING OF TABLETS* 20   Duke Lloyd MD   Mapap Extra Strength 500 mg tablet TAKE 1 TABLET EVERY 6 HOURS AS NEEDED FOR HEADACHE OR FEVER (NOT TO EXCEED 06 TABS/DAY) 3/11/20   Duke Lloyd MD   hydrocortisone (HYTONE) 2.5 % ointment APPLY TO AFFECTED AREA ON FACE NIGHTLY AS NEEDED FOR SEBORRHEIC DERMATITIS FLARE UPS 19   Duke Lloyd MD   albuterol (VENTOLIN HFA) 90 mcg/actuation inhaler Take 2 Puffs by inhalation every four (4) hours as needed for Wheezing. Provider, Historical      No Known Allergies        Objective:        Patient Vitals for the past 8 hrs:   BP Temp Resp SpO2 Height Weight   12/15/20 0653 139/70 98 °F (36.7 °C) 16 95 % 5' 8\" (1.727 m) 67.2 kg (148 lb 2 oz)       Temp (24hrs), Av °F (36.7 °C), Min:98 °F (36.7 °C), Max:98 °F (36.7 °C)      Physical Exam:  GENERAL: alert, cooperative, no distress, appears stated age, LUNG: clear to auscultation bilaterally, HEART: regular rate and rhythm, S1, S2 normal, no murmur, click, rub or gallop  AU clear no infection normal TM     Assessment:     Mixed Hearing loss and possible stapedectomy    Plan:     Discussed the risk of surgery including facial paralysis, decreased hearing, total loss of hearing , aborted operation, infection, scarring , tinnitus formation,  and the risks of general anesthetic.   The patient understands the risks; any and all questions were answered to the patient's satisfaction.     Signed By: Mario Obando MD     December 15, 2020

## 2020-12-15 NOTE — ANESTHESIA POSTPROCEDURE EVALUATION
Post-Anesthesia Evaluation and Assessment    Patient: Nicole Sanchez MRN: [id number]  SSN: [id number]    YOB: 1955  Age: 72 y.o. Sex: male      I have evaluated the patient and they are stable and ready for discharge from the PACU. Cardiovascular Function/Vital Signs  Visit Vitals  /62   Pulse 75   Temp 36.8 °C (98.2 °F)   Resp 18   Ht 5' 8\" (1.727 m)   Wt 67.2 kg (148 lb 2 oz)   SpO2 94%   BMI 22.52 kg/m²       Patient is status post General anesthesia for Procedure(s):  RIGHT EXPLORATOMY TYMPANOPLASTY STAPEDECTOMY, PLACEMENT OF FACIAL NERVE MONITORING ELECTRODES. Nausea/Vomiting: None    Postoperative hydration reviewed and adequate. Pain:  Pain Scale 1: Visual (12/15/20 1056)  Pain Intensity 1: (pt shook head \"no\" to pain inquiry; resting comfortably) (12/15/20 1056)   Managed    Neurological Status:   Neuro (WDL): Exceptions to WDL(developmentally delayed; non-verbal) (12/15/20 1056)  Neuro  LUE Motor Response: Purposeful (12/15/20 1056)  LLE Motor Response: Purposeful (12/15/20 1056)  RUE Motor Response: Purposeful (12/15/20 1056)  RLE Motor Response: Purposeful (12/15/20 1056)   At baseline    Mental Status, Level of Consciousness: Alert and  oriented to person, place, and time    Pulmonary Status:   O2 Device: Nasal cannula (12/15/20 1056)   Adequate oxygenation and airway patent    Complications related to anesthesia: None    Post-anesthesia assessment completed. No concerns    Signed By: Azael Dodson MD     December 15, 2020              Procedure(s):  RIGHT EXPLORATOMY TYMPANOPLASTY STAPEDECTOMY, PLACEMENT OF FACIAL NERVE MONITORING ELECTRODES. general    <BSHSIANPOST>    INITIAL Post-op Vital signs:   Vitals Value Taken Time   /74 12/15/2020 11:45 AM   Temp 36.8 °C (98.2 °F) 12/15/2020 10:56 AM   Pulse 78 12/15/2020 11:51 AM   Resp 15 12/15/2020 11:51 AM   SpO2 90 % 12/15/2020 11:51 AM   Vitals shown include unvalidated device data.
No

## 2021-01-19 RX ORDER — HYDROCORTISONE 25 MG/G
OINTMENT TOPICAL
Qty: 28.35 G | Refills: 0 | Status: SHIPPED | OUTPATIENT
Start: 2021-01-19 | End: 2021-04-22

## 2021-05-25 ENCOUNTER — OFFICE VISIT (OUTPATIENT)
Dept: PRIMARY CARE CLINIC | Age: 66
End: 2021-05-25
Payer: MEDICARE

## 2021-05-25 ENCOUNTER — HOSPITAL ENCOUNTER (OUTPATIENT)
Dept: GENERAL RADIOLOGY | Age: 66
Discharge: HOME | End: 2021-05-25
Payer: MEDICARE

## 2021-05-25 VITALS
BODY MASS INDEX: 22.28 KG/M2 | RESPIRATION RATE: 16 BRPM | HEART RATE: 65 BPM | TEMPERATURE: 97.2 F | OXYGEN SATURATION: 96 % | WEIGHT: 147 LBS | DIASTOLIC BLOOD PRESSURE: 76 MMHG | HEIGHT: 68 IN | SYSTOLIC BLOOD PRESSURE: 121 MMHG

## 2021-05-25 DIAGNOSIS — M25.562 ACUTE PAIN OF LEFT KNEE: ICD-10-CM

## 2021-05-25 DIAGNOSIS — M79.604 RIGHT LEG PAIN: ICD-10-CM

## 2021-05-25 DIAGNOSIS — M79.661 RIGHT CALF PAIN: ICD-10-CM

## 2021-05-25 DIAGNOSIS — M79.604 RIGHT LEG PAIN: Primary | ICD-10-CM

## 2021-05-25 PROCEDURE — 1101F PT FALLS ASSESS-DOCD LE1/YR: CPT | Performed by: FAMILY MEDICINE

## 2021-05-25 PROCEDURE — 73590 X-RAY EXAM OF LOWER LEG: CPT

## 2021-05-25 PROCEDURE — G8420 CALC BMI NORM PARAMETERS: HCPCS | Performed by: FAMILY MEDICINE

## 2021-05-25 PROCEDURE — 73562 X-RAY EXAM OF KNEE 3: CPT

## 2021-05-25 PROCEDURE — 3017F COLORECTAL CA SCREEN DOC REV: CPT | Performed by: FAMILY MEDICINE

## 2021-05-25 PROCEDURE — G8432 DEP SCR NOT DOC, RNG: HCPCS | Performed by: FAMILY MEDICINE

## 2021-05-25 PROCEDURE — G8536 NO DOC ELDER MAL SCRN: HCPCS | Performed by: FAMILY MEDICINE

## 2021-05-25 PROCEDURE — G8427 DOCREV CUR MEDS BY ELIG CLIN: HCPCS | Performed by: FAMILY MEDICINE

## 2021-05-25 PROCEDURE — 99214 OFFICE O/P EST MOD 30 MIN: CPT | Performed by: FAMILY MEDICINE

## 2021-05-25 RX ORDER — ACETAMINOPHEN 500 MG
500 TABLET ORAL 2 TIMES DAILY WITH MEALS
Qty: 10 TABLET | Refills: 0 | Status: SHIPPED | OUTPATIENT
Start: 2021-05-25

## 2021-05-25 RX ORDER — MECLIZINE HYDROCHLORIDE 25 MG/1
TABLET ORAL
COMMUNITY

## 2021-05-25 NOTE — PROGRESS NOTES
Chief Complaint   Patient presents with    Fall     Patient fell, bruise on left knee x 3 days, right calf pain x 2 days. Visit Vitals  /76 (BP 1 Location: Left upper arm, BP Patient Position: Sitting)   Pulse 65   Resp 16   Ht 5' 8\" (1.727 m)   Wt 147 lb (66.7 kg)   SpO2 96%   BMI 22.35 kg/m²        1. Have you been to the ER, urgent care clinic since your last visit? Hospitalized since your last visit? No    2. Have you seen or consulted any other health care providers outside of the [address] [place] [address] since your last visit? Include any pap smears or colon screening.  No

## 2021-05-25 NOTE — PROGRESS NOTES
Subjective:     Chief Complaint   Patient presents with    Fall     Patient fell, bruise on left knee x 3 days, right calf pain x 2 days. He  is a 77y.o. year old male is significant for moderate mental retardation, non verbal, HLD, asthma, urinary incontinence is here with his group home caregiver with a concern about right leg pain. Pt is non verbal.  He may have fell when he was taking trash out but note sure. Went to beach on Sunday, he looked like his gait was unsteady, in pain. He was not complaining about any pain though just from yesterday he was pointing pain in his right calf. No swelling noted. Review of systems not obtained due to patient factors. Objective:     Vitals:    05/25/21 1232   BP: 121/76   Pulse: 65   Resp: 16   Temp: 97.2 °F (36.2 °C)   TempSrc: Temporal   SpO2: 96%   Weight: 147 lb (66.7 kg)   Height: 5' 8\" (1.727 m)       Physical Examination: General appearance - alert, well appearing, and in no distress, oriented to person, place, and time and normal appearing weight  Mental status - alert, oriented to person, place, and time, normal mood, behavior, speech, dress, motor activity, and thought processes  Musculoskeletal - patient is bow legged. No swelling or redness noted in right knee or leg except old bruise marks. Left knee looks slightly swollen than right and slightly red. Both knee is non tender and no signs of pain with any ROM motion. Both calf looks normal, no swelling. Pt did not flinch when both calf was palpated.    Extremities - no pedal edema noted    No Known Allergies   Social History     Socioeconomic History    Marital status: PATIENT REFUSED      Spouse name: Not on file    Number of children: Not on file    Years of education: Not on file    Highest education level: Not on file   Tobacco Use    Smoking status: Never Smoker    Smokeless tobacco: Never Used   Substance and Sexual Activity    Alcohol use: No    Drug use: No     Social Determinants of Health     Financial Resource Strain:     Difficulty of Paying Living Expenses:    Food Insecurity:     Worried About [address] in the Last Year:     [address] in the Last Year:    Transportation Needs:     Lack of Transportation (Medical):  Lack of Transportation (Non-Medical):    Physical Activity:     Days of Exercise per Week:     Minutes of Exercise per Session:    Stress:     Feeling of Stress :    Social Connections:     Frequency of Communication with Friends and Family:     Frequency of Social Gatherings with Friends and Family:     Attends Islam Services:     Active Member of Clubs or Organizations:     Attends Club or Organization Meetings:     Marital Status:       Family History   Family history unknown: Yes      Past Surgical History:   Procedure Laterality Date    COLONOSCOPY,DIAGNOSTIC  11/25/2014         CO EGD TRANSORAL BIOPSY SINGLE/MULTIPLE  8/1/2013           Past Medical History:   Diagnosis Date    Asthma     PT ON DAILY INHALERS AND HAS A RESCUE INHALER    Ill-defined condition     MILD MR, AUTISM    Other ill-defined conditions(799.89)     nonverbal,uses sign language some,mental retardation    Other ill-defined conditions(799.89)     elevated cholesterol    Psychiatric disorder     MR,depression anxiety      Current Outpatient Medications   Medication Sig Dispense Refill    meclizine (ANTIVERT) 25 mg tablet Take  by mouth three (3) times daily as needed for Dizziness.       hydrocortisone (HYTONE) 2.5 % ointment APPLY TO AFFECTED AREA ON FACE NIGHTLY AS NEEDED FOR SEBORRHEIC DERMATITIS FLARE UPS 28.35 g 3    Anti-Diarrheal, Loperamide, 2 mg tablet TAKE (1) TABLET BY MOUTH TWICE A DAY AS NEEDED FOR DIARRHEA 12 Tab 0    omeprazole (PRILOSEC) 20 mg capsule TAKE 1 CAPSULE BY MOUTH DAILY AS NEEDED 30 Cap PRN    Mapap Extra Strength 500 mg tablet TAKE 1 TABLET EVERY 6 HOURS AS NEEDED FOR HEADACHE OR FEVER (NOT TO EXCEED 06 TABS/DAY) 60 Tab 2    FLUoxetine (PROZAC) 20 mg capsule Take 1 Cap by mouth daily. 30 Cap 3    albuterol (VENTOLIN HFA) 90 mcg/actuation inhaler Take 2 Puffs by inhalation every four (4) hours as needed for Wheezing.  cetirizine (ZYRTEC) 10 mg tablet Take 10 mg by mouth daily.  fluticasone (FLONASE) 50 mcg/actuation nasal spray 2 Sprays by Both Nostrils route daily.  fluticasone (FLOVENT HFA) 110 mcg/actuation inhaler Take 2 Puffs by inhalation every twelve (12) hours.  Neutrogena Sensitive Skn Moist lotion APPLY TO FACE DAILY 118 mL 12    Denta 5000 Plus 1.1 % crea BRUSH TEETH TWICE A DAY DO NOT EAT OR DRINK FOR 30 MINUTES AFTER USING 51 g 11    atorvastatin (LIPITOR) 10 mg tablet TAKE 1 TABLET BY MOUTH ONCE DAILY IN THE EVENING 30 Tab 11    therapeutic multivitamin (THERAGRAN) tablet TAKE 1 TABLET BY MOUTH DAILY 31 Tab 11    finasteride (PROSCAR) 5 mg tablet TAKE 1 TABLET DAILY *SPECIAL HANDLING OF TABLETS* 31 Tab 11    OLANZapine (ZYPREXA) 5 mg tablet Take 5 mg by mouth nightly.  buPROPion XL (WELLBUTRIN XL) 300 mg XL tablet Take 1 Tab by mouth every morning. 30 Tab 3    azelastine (OPTIVAR) 0.05 % ophthalmic solution Administer 1 Drop to both eyes two (2) times a day. Use in affected eye(s)      montelukast (SINGULAIR) 10 mg tablet Take 10 mg by mouth nightly. Assessment/ Plan:   Diagnoses and all orders for this visit:    1. Right leg pain  -     DUPLEX LOWER EXT VENOUS RIGHT; Future  -     XR TIB/FIB RT; Future-  Is normal  -     acetaminophen (TYLENOL) 500 mg tablet; Take 1 Tablet by mouth two (2) times daily (with meals). 2. Right calf pain  -     DUPLEX LOWER EXT VENOUS RIGHT; Future  -     XR TIB/FIB RT; Future- normal  -     acetaminophen (TYLENOL) 500 mg tablet; Take 1 Tablet by mouth two (2) times daily (with meals). 3. Acute pain of left knee  -     XR KNEE LT 3 V; Future- normal. No acute findings noted. -     acetaminophen (TYLENOL) 500 mg tablet;  Take 1 Tablet by mouth two (2) times daily (with meals). Medication risks/benefits/costs/interactions/alternatives discussed with patient. Advised patient to call back or return to office if symptoms worsen/change/persist. If patient cannot reach us or should anything more severe/urgent arise he/she should proceed directly to the nearest emergency department. Discussed expected course/resolution/complications of diagnosis in detail with patient. Patient given a written after visit summary which includes her diagnoses, current medications and vitals. Patient expressed understanding with the diagnosis and plan. Follow-up and Dispositions    · Return if symptoms worsen or fail to improve.

## 2021-05-26 ENCOUNTER — HOSPITAL ENCOUNTER (OUTPATIENT)
Dept: ULTRASOUND IMAGING | Age: 66
Discharge: HOME | End: 2021-05-26
Attending: FAMILY MEDICINE
Payer: MEDICARE

## 2021-05-26 DIAGNOSIS — M79.661 RIGHT CALF PAIN: ICD-10-CM

## 2021-05-26 DIAGNOSIS — M79.604 RIGHT LEG PAIN: ICD-10-CM

## 2021-05-26 PROCEDURE — 93971 EXTREMITY STUDY: CPT

## 2021-05-26 NOTE — PROGRESS NOTES
Please call patient:    Xray left knee is normal. No fracture or any fluid noted. Right leg Xray came back normal as well. No fractures noted.

## 2021-07-30 ENCOUNTER — OFFICE VISIT (OUTPATIENT)
Dept: PRIMARY CARE CLINIC | Age: 66
End: 2021-07-30
Payer: MEDICARE

## 2021-07-30 VITALS
HEIGHT: 68 IN | SYSTOLIC BLOOD PRESSURE: 128 MMHG | HEART RATE: 73 BPM | BODY MASS INDEX: 21.67 KG/M2 | DIASTOLIC BLOOD PRESSURE: 84 MMHG | TEMPERATURE: 97.6 F | RESPIRATION RATE: 18 BRPM | OXYGEN SATURATION: 98 % | WEIGHT: 143 LBS

## 2021-07-30 DIAGNOSIS — Z12.5 SPECIAL SCREENING FOR MALIGNANT NEOPLASM OF PROSTATE: ICD-10-CM

## 2021-07-30 DIAGNOSIS — Z11.1 SCREENING-PULMONARY TB: ICD-10-CM

## 2021-07-30 DIAGNOSIS — Z78.9 ADVANCE DIRECTIVE IN CHART: ICD-10-CM

## 2021-07-30 DIAGNOSIS — Z12.11 SCREEN FOR COLON CANCER: ICD-10-CM

## 2021-07-30 DIAGNOSIS — Z13.6 SCREENING FOR ISCHEMIC HEART DISEASE: ICD-10-CM

## 2021-07-30 DIAGNOSIS — Z23 ENCOUNTER FOR IMMUNIZATION: ICD-10-CM

## 2021-07-30 DIAGNOSIS — N40.1 BENIGN PROSTATIC HYPERPLASIA (BPH) WITH URINARY URGE INCONTINENCE: ICD-10-CM

## 2021-07-30 DIAGNOSIS — E78.5 HYPERLIPIDEMIA, UNSPECIFIED HYPERLIPIDEMIA TYPE: ICD-10-CM

## 2021-07-30 DIAGNOSIS — N39.41 BENIGN PROSTATIC HYPERPLASIA (BPH) WITH URINARY URGE INCONTINENCE: ICD-10-CM

## 2021-07-30 DIAGNOSIS — Z00.00 MEDICARE ANNUAL WELLNESS VISIT, SUBSEQUENT: Primary | ICD-10-CM

## 2021-07-30 PROCEDURE — 99212 OFFICE O/P EST SF 10 MIN: CPT | Performed by: FAMILY MEDICINE

## 2021-07-30 PROCEDURE — 1101F PT FALLS ASSESS-DOCD LE1/YR: CPT | Performed by: FAMILY MEDICINE

## 2021-07-30 PROCEDURE — 90732 PPSV23 VACC 2 YRS+ SUBQ/IM: CPT | Performed by: FAMILY MEDICINE

## 2021-07-30 PROCEDURE — G8510 SCR DEP NEG, NO PLAN REQD: HCPCS | Performed by: FAMILY MEDICINE

## 2021-07-30 PROCEDURE — G8536 NO DOC ELDER MAL SCRN: HCPCS | Performed by: FAMILY MEDICINE

## 2021-07-30 PROCEDURE — G8427 DOCREV CUR MEDS BY ELIG CLIN: HCPCS | Performed by: FAMILY MEDICINE

## 2021-07-30 PROCEDURE — G0009 ADMIN PNEUMOCOCCAL VACCINE: HCPCS | Performed by: FAMILY MEDICINE

## 2021-07-30 PROCEDURE — G8420 CALC BMI NORM PARAMETERS: HCPCS | Performed by: FAMILY MEDICINE

## 2021-07-30 PROCEDURE — 3017F COLORECTAL CA SCREEN DOC REV: CPT | Performed by: FAMILY MEDICINE

## 2021-07-30 PROCEDURE — G0439 PPPS, SUBSEQ VISIT: HCPCS | Performed by: FAMILY MEDICINE

## 2021-07-30 RX ORDER — ZOSTER VACCINE RECOMBINANT, ADJUVANTED 50 MCG/0.5
KIT INTRAMUSCULAR
Qty: 0.5 ML | Refills: 1 | Status: SHIPPED | OUTPATIENT
Start: 2021-07-30 | End: 2021-07-30 | Stop reason: CLARIF

## 2021-07-30 NOTE — PATIENT INSTRUCTIONS
Medicare Wellness Visit, Male    The best way to live healthy is to have a lifestyle where you eat a well-balanced diet, exercise regularly, limit alcohol use, and quit all forms of tobacco/nicotine, if applicable. Regular preventive services are another way to keep healthy. Preventive services (vaccines, screening tests, monitoring & exams) can help personalize your care plan, which helps you manage your own care. Screening tests can find health problems at the earliest stages, when they are easiest to treat. [facility] follows the current, evidence-based guidelines published by the [facility] ([facility]) when recommending preventive services for our patients. Because we follow these guidelines, sometimes recommendations change over time as research supports it. (For example, a prostate screening blood test is no longer routinely recommended for men with no symptoms). Of course, you and your doctor may decide to screen more often for some diseases, based on your risk and co-morbidities (chronic disease you are already diagnosed with). Preventive services for you include:  - Medicare offers their members a free annual wellness visit, which is time for you and your primary care provider to discuss and plan for your preventive service needs. Take advantage of this benefit every year!  -All adults over age 72 should receive the recommended pneumonia vaccines. Current USPSTF guidelines recommend a series of two vaccines for the best pneumonia protection.   -All adults should have a flu vaccine yearly and tetanus vaccine every 10 years.  -All adults age 48 and older should receive the shingles vaccines (series of two vaccines).        -All adults age 38-68 who are overweight should have a diabetes screening test once every three years.   -Other screening tests & preventive services for persons with diabetes include: an eye exam to screen for diabetic retinopathy, a kidney function test, a foot exam, and stricter control over your cholesterol.   -Cardiovascular screening for adults with routine risk involves an electrocardiogram (ECG) at intervals determined by the provider.   -Colorectal cancer screening should be done for adults age 54-65 with no increased risk factors for colorectal cancer. There are a number of acceptable methods of screening for this type of cancer. Each test has its own benefits and drawbacks. Discuss with your provider what is most appropriate for you during your annual wellness visit. The different tests include: colonoscopy (considered the best screening method), a fecal occult blood test, a fecal DNA test, and sigmoidoscopy.  -All adults born between [facility] should be screened once for Hepatitis C.  -An Abdominal Aortic Aneurysm (AAA) Screening is recommended for men age 73-68 who has ever smoked in their lifetime.      Here is a list of your current Health Maintenance items (your personalized list of preventive services) with a due date:  Health Maintenance Due   Topic Date Due    Shingles Vaccine (1 of 2) Never done    Pneumococcal Vaccine (1 of 1 - PPSV23) Never done    Colorectal Screening  07/15/2021    Cholesterol Test   07/27/2021

## 2021-07-30 NOTE — PROGRESS NOTES
[facility] Medicare Wellness Visit      Howie Griffith is a 77 y.o. male and presents for Annual Medicare Wellness Visit. Vitals:    07/30/21 0859   BP: 128/84   Pulse: 73   Resp: 18   Temp: 97.6 °F (36.4 °C)   TempSrc: Temporal   SpO2: 98%   Weight: 143 lb (64.9 kg)   Height: 5' 8\" (1.727 m)        Depression Screen:   3 most recent PHQ Screens 7/30/2021   Little interest or pleasure in doing things Not at all   Feeling down, depressed, irritable, or hopeless Not at all   Total Score PHQ 2 0       Fall Risk Assessment:    Fall Risk Assessment, last 12 mths 7/30/2021   Able to walk? Yes   Fall in past 12 months? 0   Do you feel unsteady? 0   Are you worried about falling 0       Abuse Screen:   Abuse Screening Questionnaire 7/30/2021   Do you ever feel afraid of your partner? N   Are you in a relationship with someone who physically or mentally threatens you? N   Is it safe for you to go home? Y       1. Have you been to the ER, urgent care clinic since your last visit? Hospitalized since your last visit? No    2. Have you seen or consulted any other health care providers outside of the [address] [place] [address] since your last visit? Include any pap smears or colon screening.  No

## 2021-07-31 RX ORDER — ATORVASTATIN CALCIUM 10 MG/1
TABLET, FILM COATED ORAL
Qty: 30 TABLET | Refills: 5 | Status: SHIPPED | OUTPATIENT
Start: 2021-07-31 | End: 2022-02-11

## 2021-07-31 RX ORDER — FINASTERIDE 5 MG/1
TABLET, FILM COATED ORAL
Qty: 30 TABLET | Refills: 5 | Status: SHIPPED | OUTPATIENT
Start: 2021-07-31 | End: 2022-02-01 | Stop reason: SDUPTHER

## 2021-11-24 RX ORDER — SODIUM FLUORIDE 1.1 G/100G
GEL, DENTIFRICE ORAL
Qty: 51 G | Refills: 0 | Status: SHIPPED | OUTPATIENT
Start: 2021-11-24 | End: 2022-03-09

## 2022-02-01 ENCOUNTER — OFFICE VISIT (OUTPATIENT)
Dept: PRIMARY CARE CLINIC | Age: 67
End: 2022-02-01
Payer: MEDICARE

## 2022-02-01 VITALS
TEMPERATURE: 97.6 F | OXYGEN SATURATION: 99 % | DIASTOLIC BLOOD PRESSURE: 66 MMHG | HEIGHT: 68 IN | WEIGHT: 129 LBS | SYSTOLIC BLOOD PRESSURE: 122 MMHG | BODY MASS INDEX: 19.55 KG/M2 | HEART RATE: 75 BPM | RESPIRATION RATE: 18 BRPM

## 2022-02-01 DIAGNOSIS — N40.1 BENIGN PROSTATIC HYPERPLASIA (BPH) WITH URINARY URGE INCONTINENCE: ICD-10-CM

## 2022-02-01 DIAGNOSIS — E78.5 HYPERLIPIDEMIA, UNSPECIFIED HYPERLIPIDEMIA TYPE: Primary | ICD-10-CM

## 2022-02-01 DIAGNOSIS — N39.41 BENIGN PROSTATIC HYPERPLASIA (BPH) WITH URINARY URGE INCONTINENCE: ICD-10-CM

## 2022-02-01 LAB
ALBUMIN SERPL-MCNC: 3.8 G/DL (ref 3.5–5)
ALBUMIN/GLOB SERPL: 1.1 {RATIO} (ref 1.1–2.2)
ALP SERPL-CCNC: 88 U/L (ref 45–117)
ALT SERPL-CCNC: 17 U/L (ref 12–78)
ANION GAP SERPL CALC-SCNC: 4 MMOL/L (ref 5–15)
AST SERPL-CCNC: 17 U/L (ref 15–37)
BILIRUB SERPL-MCNC: 0.5 MG/DL (ref 0.2–1)
BUN SERPL-MCNC: 13 MG/DL (ref 6–20)
BUN/CREAT SERPL: 12 (ref 12–20)
CALCIUM SERPL-MCNC: 9.1 MG/DL (ref 8.5–10.1)
CHLORIDE SERPL-SCNC: 105 MMOL/L (ref 97–108)
CHOLEST SERPL-MCNC: 120 MG/DL
CO2 SERPL-SCNC: 33 MMOL/L (ref 21–32)
CREAT SERPL-MCNC: 1.05 MG/DL (ref 0.7–1.3)
GLOBULIN SER CALC-MCNC: 3.4 G/DL (ref 2–4)
GLUCOSE SERPL-MCNC: 78 MG/DL (ref 65–100)
HDLC SERPL-MCNC: 50 MG/DL
HDLC SERPL: 2.4 {RATIO} (ref 0–5)
LDLC SERPL CALC-MCNC: 56.6 MG/DL (ref 0–100)
POTASSIUM SERPL-SCNC: 4 MMOL/L (ref 3.5–5.1)
PROT SERPL-MCNC: 7.2 G/DL (ref 6.4–8.2)
SODIUM SERPL-SCNC: 142 MMOL/L (ref 136–145)
TRIGL SERPL-MCNC: 67 MG/DL (ref ?–150)
VLDLC SERPL CALC-MCNC: 13.4 MG/DL

## 2022-02-01 PROCEDURE — 99213 OFFICE O/P EST LOW 20 MIN: CPT | Performed by: FAMILY MEDICINE

## 2022-02-01 PROCEDURE — G8432 DEP SCR NOT DOC, RNG: HCPCS | Performed by: FAMILY MEDICINE

## 2022-02-01 PROCEDURE — 1101F PT FALLS ASSESS-DOCD LE1/YR: CPT | Performed by: FAMILY MEDICINE

## 2022-02-01 PROCEDURE — G8420 CALC BMI NORM PARAMETERS: HCPCS | Performed by: FAMILY MEDICINE

## 2022-02-01 PROCEDURE — 3017F COLORECTAL CA SCREEN DOC REV: CPT | Performed by: FAMILY MEDICINE

## 2022-02-01 PROCEDURE — G8536 NO DOC ELDER MAL SCRN: HCPCS | Performed by: FAMILY MEDICINE

## 2022-02-01 PROCEDURE — G8427 DOCREV CUR MEDS BY ELIG CLIN: HCPCS | Performed by: FAMILY MEDICINE

## 2022-02-01 RX ORDER — FINASTERIDE 5 MG/1
TABLET, FILM COATED ORAL
Qty: 30 TABLET | Refills: 5 | Status: SHIPPED | OUTPATIENT
Start: 2022-02-01 | End: 2022-08-15 | Stop reason: SDUPTHER

## 2022-02-01 NOTE — PROGRESS NOTES
Subjective:     Chief Complaint   Patient presents with    Cholesterol Problem        He  is a 77y.o. year old male is significant for moderate mental retardation, non verbal, HLD, asthma, urinary incontinence. He is here for  and follow up.   No new concerns today. Doing well. He is here with his group home staff.     HLD:  He is taking Lipitor 10 mg daily. Complaint.      BPH related urinary incontinence: Has been taking Finasteride for urinary incontinence. Well controlled.     Asthma: Well controlled with singulair. Has been following up with allergy specialist.     Psych: Managed by psychiatrist.       Review of systems not obtained due to patient factors. Lab Results   Component Value Date/Time    Cholesterol, total 113 07/30/2021 09:45 AM    HDL Cholesterol 54 07/30/2021 09:45 AM    LDL, calculated 49.4 07/30/2021 09:45 AM    VLDL, calculated 9.6 07/30/2021 09:45 AM    Triglyceride 48 07/30/2021 09:45 AM    CHOL/HDL Ratio 2.1 07/30/2021 09:45 AM         Review of systems not obtained due to patient factors. Objective:     Vitals:    02/01/22 1037   BP: 122/66   Pulse: 75   Resp: 18   Temp: 97.6 °F (36.4 °C)   TempSrc: Temporal   SpO2: 99%   Weight: 129 lb (58.5 kg)   Height: 5' 8\" (1.727 m)       Physical Examination: GENERAL ASSESSMENT: active, alert, no acute distress, well hydrated, well nourished  EARS: bilateral TM's and external ear canals normal  LUNGS: Respiratory effort normal, clear to auscultation, normal breath sounds bilaterally  HEART: Regular rate and rhythm, normal S1/S2, no murmurs, normal pulses and capillary fill  ABDOMEN: Normal bowel sounds, soft, nondistended, no mass, no organomegaly. No Known Allergies   Social History     Socioeconomic History    Marital status: PATIENT REFUSED    Tobacco Use    Smoking status: Never Smoker    Smokeless tobacco: Never Used   Vaping Use    Vaping Use: Never used   Substance and Sexual Activity    Alcohol use: No    Drug use:  No Family History   Family history unknown: Yes      Past Surgical History:   Procedure Laterality Date    COLONOSCOPY,DIAGNOSTIC  11/25/2014         [place] EGD TRANSORAL BIOPSY SINGLE/MULTIPLE  8/1/2013           Past Medical History:   Diagnosis Date    Asthma     PT ON DAILY INHALERS AND HAS A RESCUE INHALER    Ill-defined condition     MILD MR, AUTISM    Other ill-defined conditions(799.89)     nonverbal,uses sign language some,mental retardation    Other ill-defined conditions(799.89)     elevated cholesterol    Psychiatric disorder     MR,depression anxiety      Current Outpatient Medications   Medication Sig Dispense Refill    Denta 5000 Plus 1.1 % crea BRUSH TEETH TWICE A DAY DO NOT EAT OR DRINK FOR 30 MINUTES AFTER USING 51 g 0    atorvastatin (LIPITOR) 10 mg tablet TAKE 1 TABLET BY MOUTH ONCE DAILY IN THE EVENING 30 Tablet 5    finasteride (PROSCAR) 5 mg tablet TAKE 1 TABLET DAILY *SPECIAL HANDLING OF TABLETS* 30 Tablet 5    Anti-Diarrheal, Loperamide, 2 mg tablet TAKE (1) TABLET BY MOUTH TWICE A DAY AS NEEDED FOR DIARRHEA 12 Tablet 0    Thera tablet TAKE 1 TABLET BY MOUTH DAILY 30 Tablet 12    meclizine (ANTIVERT) 25 mg tablet Take  by mouth three (3) times daily as needed for Dizziness.  acetaminophen (TYLENOL) 500 mg tablet Take 1 Tablet by mouth two (2) times daily (with meals). 10 Tablet 0    Neutrogena Sensitive Skn Moist lotion APPLY TO FACE DAILY 118 mL 12    hydrocortisone (HYTONE) 2.5 % ointment APPLY TO AFFECTED AREA ON FACE NIGHTLY AS NEEDED FOR SEBORRHEIC DERMATITIS FLARE UPS 28.35 g 3    omeprazole (PRILOSEC) 20 mg capsule TAKE 1 CAPSULE BY MOUTH DAILY AS NEEDED 30 Cap PRN    Mapap Extra Strength 500 mg tablet TAKE 1 TABLET EVERY 6 HOURS AS NEEDED FOR HEADACHE OR FEVER (NOT TO EXCEED 06 TABS/DAY) 60 Tab 2    OLANZapine (ZYPREXA) 5 mg tablet Take 5 mg by mouth nightly.  buPROPion XL (WELLBUTRIN XL) 300 mg XL tablet Take 1 Tab by mouth every morning.  30 Tab 3    FLUoxetine (PROZAC) 20 mg capsule Take 1 Cap by mouth daily. 30 Cap 3    albuterol (VENTOLIN HFA) 90 mcg/actuation inhaler Take 2 Puffs by inhalation every four (4) hours as needed for Wheezing.  cetirizine (ZYRTEC) 10 mg tablet Take 10 mg by mouth daily.  fluticasone (FLONASE) 50 mcg/actuation nasal spray 2 Sprays by Both Nostrils route daily.  azelastine (OPTIVAR) 0.05 % ophthalmic solution Administer 1 Drop to both eyes two (2) times a day. Use in affected eye(s)      fluticasone (FLOVENT HFA) 110 mcg/actuation inhaler Take 2 Puffs by inhalation every twelve (12) hours. (Patient not taking: Reported on 7/30/2021)      montelukast (SINGULAIR) 10 mg tablet Take 10 mg by mouth nightly. Assessment/ Plan:   Diagnoses and all orders for this visit:    1. Hyperlipidemia, unspecified hyperlipidemia type  -     METABOLIC PANEL, COMPREHENSIVE; Future  -     LIPID PANEL; Future             Continue current med. 2. Benign prostatic hyperplasia (BPH) with urinary urge incontinence  -     finasteride (PROSCAR) 5 mg tablet; TAKE 1 TABLET DAILY           Medication risks/benefits/costs/interactions/alternatives discussed with patient. Advised patient to call back or return to office if symptoms worsen/change/persist. If patient cannot reach us or should anything more severe/urgent arise he/she should proceed directly to the nearest emergency department. Discussed expected course/resolution/complications of diagnosis in detail with patient. Patient given a written after visit summary which includes her diagnoses, current medications and vitals. Patient expressed understanding with the diagnosis and plan. Follow-up and Dispositions    · Return in about 6 months (around 8/1/2022), or if symptoms worsen or fail to improve, for cholesterol, medicare well ness.

## 2022-02-01 NOTE — PROGRESS NOTES
Identified pt with two pt identifiers(name and ). Chief Complaint   Patient presents with    Cholesterol Problem        3 most recent PHQ Screens 2022   PHQ Not Done Functional capacity motivation limits accuracy   Little interest or pleasure in doing things -   Feeling down, depressed, irritable, or hopeless -   Total Score PHQ 2 -        Vitals:    22 1037   BP: 122/66   Pulse: 75   Resp: 18   Temp: 97.6 °F (36.4 °C)   TempSrc: Temporal   SpO2: 99%   Weight: 129 lb (58.5 kg)   Height: 5' 8\" (1.727 m)       Health Maintenance Due   Topic    COVID-19 Vaccine (3 - Booster for Mismi series)    Flu Vaccine (1)       1. Have you been to the ER, urgent care clinic since your last visit? Hospitalized since your last visit? No    2. Have you seen or consulted any other health care providers outside of the [address] [place] [address] since your last visit? Include any pap smears or colon screening.  No

## 2022-03-19 PROBLEM — R26.81 UNSTEADY GAIT: Status: ACTIVE | Noted: 2019-10-25

## 2022-03-19 PROBLEM — F81.89 DEVELOPMENTAL NON-VERBAL DISORDER: Status: ACTIVE | Noted: 2018-03-20

## 2022-03-19 PROBLEM — J45.20 MILD INTERMITTENT ASTHMA WITHOUT COMPLICATION: Status: ACTIVE | Noted: 2018-11-26

## 2022-03-19 PROBLEM — N40.1 BENIGN PROSTATIC HYPERPLASIA (BPH) WITH URINARY URGE INCONTINENCE: Status: ACTIVE | Noted: 2019-05-30

## 2022-03-19 PROBLEM — E78.5 HYPERLIPIDEMIA: Status: ACTIVE | Noted: 2018-03-20

## 2022-03-19 PROBLEM — N39.41 BENIGN PROSTATIC HYPERPLASIA (BPH) WITH URINARY URGE INCONTINENCE: Status: ACTIVE | Noted: 2019-05-30

## 2022-03-19 PROBLEM — F71 MODERATE INTELLECTUAL DISABILITY: Status: ACTIVE | Noted: 2018-03-20

## 2022-06-17 ENCOUNTER — HOSPITAL ENCOUNTER (OUTPATIENT)
Dept: GENERAL RADIOLOGY | Age: 67
Discharge: HOME | End: 2022-06-17
Attending: INTERNAL MEDICINE
Payer: MEDICARE

## 2022-06-17 ENCOUNTER — OFFICE VISIT (OUTPATIENT)
Dept: PRIMARY CARE CLINIC | Age: 67
End: 2022-06-17
Payer: MEDICARE

## 2022-06-17 VITALS
BODY MASS INDEX: 16.97 KG/M2 | RESPIRATION RATE: 16 BRPM | SYSTOLIC BLOOD PRESSURE: 98 MMHG | HEART RATE: 65 BPM | OXYGEN SATURATION: 99 % | TEMPERATURE: 97.1 F | HEIGHT: 68 IN | WEIGHT: 112 LBS | DIASTOLIC BLOOD PRESSURE: 60 MMHG

## 2022-06-17 DIAGNOSIS — R10.9 ABDOMINAL DISCOMFORT: ICD-10-CM

## 2022-06-17 DIAGNOSIS — R63.4 WEIGHT LOSS: ICD-10-CM

## 2022-06-17 DIAGNOSIS — T17.308A CHOKING, INITIAL ENCOUNTER: ICD-10-CM

## 2022-06-17 DIAGNOSIS — R19.7 DIARRHEA, UNSPECIFIED TYPE: ICD-10-CM

## 2022-06-17 DIAGNOSIS — R63.4 WEIGHT LOSS: Primary | ICD-10-CM

## 2022-06-17 DIAGNOSIS — Z12.5 PROSTATE CANCER SCREENING: ICD-10-CM

## 2022-06-17 LAB
ALBUMIN SERPL-MCNC: 4 G/DL (ref 3.5–5)
ALBUMIN/GLOB SERPL: 1.3 {RATIO} (ref 1.1–2.2)
ALP SERPL-CCNC: 77 U/L (ref 45–117)
ALT SERPL-CCNC: 28 U/L (ref 12–78)
ANION GAP SERPL CALC-SCNC: 6 MMOL/L (ref 5–15)
AST SERPL-CCNC: 18 U/L (ref 15–37)
BASOPHILS # BLD: 0 K/UL (ref 0–0.1)
BASOPHILS NFR BLD: 1 % (ref 0–1)
BILIRUB SERPL-MCNC: 0.3 MG/DL (ref 0.2–1)
BUN SERPL-MCNC: 25 MG/DL (ref 6–20)
BUN/CREAT SERPL: 19 (ref 12–20)
CALCIUM SERPL-MCNC: 9.2 MG/DL (ref 8.5–10.1)
CHLORIDE SERPL-SCNC: 106 MMOL/L (ref 97–108)
CO2 SERPL-SCNC: 32 MMOL/L (ref 21–32)
CORTIS AM PEAK SERPL-MCNC: 16.3 UG/DL (ref 4.3–22.45)
CREAT SERPL-MCNC: 1.32 MG/DL (ref 0.7–1.3)
DIFFERENTIAL METHOD BLD: ABNORMAL
EOSINOPHIL # BLD: 0 K/UL (ref 0–0.4)
EOSINOPHIL NFR BLD: 1 % (ref 0–7)
ERYTHROCYTE [DISTWIDTH] IN BLOOD BY AUTOMATED COUNT: 13.6 % (ref 11.5–14.5)
GLOBULIN SER CALC-MCNC: 3.1 G/DL (ref 2–4)
GLUCOSE SERPL-MCNC: 59 MG/DL (ref 65–100)
HCT VFR BLD AUTO: 36.3 % (ref 36.6–50.3)
HGB BLD-MCNC: 12.2 G/DL (ref 12.1–17)
IMM GRANULOCYTES # BLD AUTO: 0 K/UL (ref 0–0.04)
IMM GRANULOCYTES NFR BLD AUTO: 0 % (ref 0–0.5)
LYMPHOCYTES # BLD: 1.7 K/UL (ref 0.8–3.5)
LYMPHOCYTES NFR BLD: 37 % (ref 12–49)
MCH RBC QN AUTO: 33.6 PG (ref 26–34)
MCHC RBC AUTO-ENTMCNC: 33.6 G/DL (ref 30–36.5)
MCV RBC AUTO: 100 FL (ref 80–99)
MONOCYTES # BLD: 0.3 K/UL (ref 0–1)
MONOCYTES NFR BLD: 8 % (ref 5–13)
NEUTS SEG # BLD: 2.4 K/UL (ref 1.8–8)
NEUTS SEG NFR BLD: 53 % (ref 32–75)
NRBC # BLD: 0 K/UL (ref 0–0.01)
NRBC BLD-RTO: 0 PER 100 WBC
PLATELET # BLD AUTO: 150 K/UL (ref 150–400)
PMV BLD AUTO: 11.4 FL (ref 8.9–12.9)
POTASSIUM SERPL-SCNC: 3.6 MMOL/L (ref 3.5–5.1)
PROT SERPL-MCNC: 7.1 G/DL (ref 6.4–8.2)
PSA SERPL-MCNC: 0.1 NG/ML (ref 0.01–4)
RBC # BLD AUTO: 3.63 M/UL (ref 4.1–5.7)
SODIUM SERPL-SCNC: 144 MMOL/L (ref 136–145)
T3FREE SERPL-MCNC: 1.7 PG/ML (ref 2.2–4)
T4 FREE SERPL-MCNC: 0.8 NG/DL (ref 0.8–1.5)
TSH SERPL DL<=0.05 MIU/L-ACNC: 0.5 UIU/ML (ref 0.36–3.74)
WBC # BLD AUTO: 4.4 K/UL (ref 4.1–11.1)

## 2022-06-17 PROCEDURE — G8419 CALC BMI OUT NRM PARAM NOF/U: HCPCS | Performed by: INTERNAL MEDICINE

## 2022-06-17 PROCEDURE — G8427 DOCREV CUR MEDS BY ELIG CLIN: HCPCS | Performed by: INTERNAL MEDICINE

## 2022-06-17 PROCEDURE — 71046 X-RAY EXAM CHEST 2 VIEWS: CPT

## 2022-06-17 PROCEDURE — G8536 NO DOC ELDER MAL SCRN: HCPCS | Performed by: INTERNAL MEDICINE

## 2022-06-17 PROCEDURE — 1123F ACP DISCUSS/DSCN MKR DOCD: CPT | Performed by: INTERNAL MEDICINE

## 2022-06-17 PROCEDURE — 99214 OFFICE O/P EST MOD 30 MIN: CPT | Performed by: INTERNAL MEDICINE

## 2022-06-17 PROCEDURE — 1101F PT FALLS ASSESS-DOCD LE1/YR: CPT | Performed by: INTERNAL MEDICINE

## 2022-06-17 PROCEDURE — G8432 DEP SCR NOT DOC, RNG: HCPCS | Performed by: INTERNAL MEDICINE

## 2022-06-17 PROCEDURE — 3017F COLORECTAL CA SCREEN DOC REV: CPT | Performed by: INTERNAL MEDICINE

## 2022-06-17 NOTE — PROGRESS NOTES
Chief Complaint   Patient presents with    Weight Loss     no changes to eating or drinking- would like to be tested for h.pylori       Health Maintenance Due   Topic    COVID-19 Vaccine (3 - Booster for P[place]co Nikos series)        1. Have you been to the ER, urgent care clinic since your last visit? Hospitalized since your last visit? No    2. Have you seen or consulted any other health care providers outside of the [address] [place] [address] since your last visit? Include any pap smears or colon screening.  No    Visit Vitals  BP (!) 98/59 (BP 1 Location: Right arm, BP Patient Position: Sitting, BP Cuff Size: Adult)   Pulse 65   Temp 97.1 °F (36.2 °C) (Temporal)   Resp 16   Ht 5' 8\" (1.727 m)   Wt 112 lb (50.8 kg)   SpO2 99%   BMI 17.03 kg/m²

## 2022-06-19 NOTE — PROGRESS NOTES
Bernard Cerrato is a 79 y.o.  male and presents with     Chief Complaint   Patient presents with    Weight Loss     no changes to eating or drinking- would like to be tested for h.pylori     Patient is here to establish care. Used to see Dr. Dorothy Matute  Patient has a history of autism, mental retardation. Sees a psychiatrist  Patient is not able to give any history. Lives in a group home and is accompanied by caregiver who informed that patient has lost weight  He has not been eating very well. Apparently he does not have any cough. No other history could be obtained from the caregiver on the patient  Sometimes chokes on food      Past Medical History:   Diagnosis Date    Asthma     PT ON DAILY INHALERS AND HAS A RESCUE INHALER    Ill-defined condition     MILD MR, AUTISM    Other ill-defined conditions(799.89)     nonverbal,uses sign language some,mental retardation    Other ill-defined conditions(799.89)     elevated cholesterol    Psychiatric disorder     MR,depression anxiety     Past Surgical History:   Procedure Laterality Date    COLONOSCOPY,DIAGNOSTIC  11/25/2014         [place] EGD TRANSORAL BIOPSY SINGLE/MULTIPLE  8/1/2013          Current Outpatient Medications   Medication Sig    Denta 5000 Plus 1.1 % crea BRUSH TEETH TWICE A DAY DO NOT EAT OR DRINK FOR 30 MINUTES AFTER USING    atorvastatin (LIPITOR) 10 mg tablet TAKE 1 TABLET BY MOUTH ONCE DAILY IN THE EVENING    finasteride (PROSCAR) 5 mg tablet TAKE 1 TABLET DAILY    Anti-Diarrheal, Loperamide, 2 mg tablet TAKE (1) TABLET BY MOUTH TWICE A DAY AS NEEDED FOR DIARRHEA    Thera tablet TAKE 1 TABLET BY MOUTH DAILY    meclizine (ANTIVERT) 25 mg tablet Take  by mouth three (3) times daily as needed for Dizziness.  acetaminophen (TYLENOL) 500 mg tablet Take 1 Tablet by mouth two (2) times daily (with meals).     Neutrogena Sensitive Skn Moist lotion APPLY TO FACE DAILY    hydrocortisone (HYTONE) 2.5 % ointment APPLY TO AFFECTED AREA ON FACE NIGHTLY AS NEEDED FOR SEBORRHEIC DERMATITIS FLARE UPS    omeprazole (PRILOSEC) 20 mg capsule TAKE 1 CAPSULE BY MOUTH DAILY AS NEEDED    Mapap Extra Strength 500 mg tablet TAKE 1 TABLET EVERY 6 HOURS AS NEEDED FOR HEADACHE OR FEVER (NOT TO EXCEED 06 TABS/DAY)    OLANZapine (ZYPREXA) 5 mg tablet Take 5 mg by mouth nightly.  buPROPion XL (WELLBUTRIN XL) 300 mg XL tablet Take 1 Tab by mouth every morning.  FLUoxetine (PROZAC) 20 mg capsule Take 1 Cap by mouth daily.  albuterol (VENTOLIN HFA) 90 mcg/actuation inhaler Take 2 Puffs by inhalation every four (4) hours as needed for Wheezing.  cetirizine (ZYRTEC) 10 mg tablet Take 10 mg by mouth daily.  fluticasone (FLONASE) 50 mcg/actuation nasal spray 2 Sprays by Both Nostrils route daily.  azelastine (OPTIVAR) 0.05 % ophthalmic solution Administer 1 Drop to both eyes two (2) times a day. Use in affected eye(s)    montelukast (SINGULAIR) 10 mg tablet Take 10 mg by mouth nightly.  fluticasone (FLOVENT HFA) 110 mcg/actuation inhaler Take 2 Puffs by inhalation every twelve (12) hours. (Patient not taking: Reported on 6/17/2022)     No current facility-administered medications for this visit.      Health Maintenance   Topic Date Due    COVID-19 Vaccine (3 - Booster for Pfizer series) 07/09/2021    Depression Screen  07/30/2022    Pneumococcal 65+ years (2 - PCV) 07/30/2022    Medicare Yearly Exam  07/31/2022    Flu Vaccine (Season Ended) 09/01/2022    Lipid Screen  02/01/2023    Colorectal Cancer Screening Combo  11/23/2024    DTaP/Tdap/Td series (2 - Td or Tdap) 11/26/2028    Hepatitis C Screening  Completed    Shingrix Vaccine Age 50>  Completed     Immunization History   Administered Date(s) Administered    COVID-19, Pfizer Purple top, DILUTE for use, 12+ yrs, 30mcg/0.3mL dose 01/19/2021, 02/09/2021    Influenza Vaccine [place]) PF (>6 Mo Flulaval, Fluarix, and >3 Yrs [address] [place] [address], Fluzone [id number]) 09/26/2018    Influenza Vaccine (Tri) Adjuvanted (>65 Yrs FLUAD TRI 25629) 01/17/2020    Pneumococcal Polysaccharide (PPSV-23) 07/30/2021    Tdap 11/26/2018    Zoster Recombinant 07/28/2020, 10/27/2020     No LMP for male patient. Allergies and Intolerances:   No Known Allergies    Family History:   Family History   Family history unknown: Yes       Social History:   He  reports that he has never smoked. He has never used smokeless tobacco.  He  reports no history of alcohol use.             Review of Systems:   General: negative for - chills, fatigue, fever, weight change  Psych: negative for - anxiety, depression, irritability or mood swings  ENT: negative for - headaches, hearing change, nasal congestion, oral lesions, sneezing or sore throat  Heme/ Lymph: negative for - bleeding problems, bruising, pallor or swollen lymph nodes  Endo: negative for - hot flashes, polydipsia/polyuria or temperature intolerance  Resp: negative for - cough, shortness of breath or wheezing  CV: negative for - chest pain, edema or palpitations  GI: negative for - abdominal pain, change in bowel habits, constipation, diarrhea or nausea/vomiting  : negative for - dysuria, hematuria, incontinence, pelvic pain or vulvar/vaginal symptoms  MSK: negative for - joint pain, joint swelling or muscle pain  Neuro: negative for - confusion, headaches, seizures or weakness  Derm: negative for - dry skin, hair changes, rash or skin lesion changes          Physical:   Vitals:   Vitals:    06/17/22 0935 06/17/22 0947   BP: (!) 98/59 98/60   Pulse: 65    Resp: 16    Temp: 97.1 °F (36.2 °C)    TempSrc: Temporal    SpO2: 99%    Weight: 112 lb (50.8 kg)    Height: 5' 8\" (1.727 m)            Exam:   HEENT- atraumatic,normocephalic, awake, oriented, thin  Neck - supple,no enlarged lymph nodes, no JVD, no thyromegaly  Chest- CTA, no rhonchi, no crackles  Heart- rrr, no murmurs / gallop/rub  Abdomen- soft,BS+,NT, no hepatosplenomegaly, seems to be in mild abdominal discomfort  Ext - no c/c/edema   Neuro- no focal deficits. Power 5/5 all extremities  Skin - warm,dry, no obvious rashes. Review of Data:   LABS:   Lab Results   Component Value Date/Time    WBC 4.4 06/17/2022 10:51 AM    HGB 12.2 06/17/2022 10:51 AM    HCT 36.3 (L) 06/17/2022 10:51 AM    PLATELET 415 73/16/0995 10:51 AM     Lab Results   Component Value Date/Time    Sodium 144 06/17/2022 10:51 AM    Potassium 3.6 06/17/2022 10:51 AM    Chloride 106 06/17/2022 10:51 AM    CO2 32 06/17/2022 10:51 AM    Glucose 59 (L) 06/17/2022 10:51 AM    BUN 25 (H) 06/17/2022 10:51 AM    Creatinine 1.32 (H) 06/17/2022 10:51 AM     Lab Results   Component Value Date/Time    Cholesterol, total 120 02/01/2022 11:20 AM    HDL Cholesterol 50 02/01/2022 11:20 AM    LDL, calculated 56.6 02/01/2022 11:20 AM    Triglyceride 67 02/01/2022 11:20 AM     No components found for: GPT        Impression / Plan:        ICD-10-CM ICD-9-CM    1. Weight loss  R63.4 783.21 TSH 3RD GENERATION      T4, FREE      T3, FREE      XR CHEST PA LAT      CBC WITH AUTOMATED DIFF      METABOLIC PANEL, COMPREHENSIVE      CORTISOL, AM      REFERRAL TO GASTROENTEROLOGY      CORTISOL, AM      METABOLIC PANEL, COMPREHENSIVE      CBC WITH AUTOMATED DIFF      T3, FREE      T4, FREE      TSH 3RD GENERATION   2. Choking, initial encounter  T17.308A 933.1 REFERRAL TO GASTROENTEROLOGY   3. Prostate cancer screening  Z12.5 V76.44 PSA SCREENING (SCREENING)      PSA SCREENING (SCREENING)   4. Diarrhea, unspecified type  R19.7 787.91 REFERRAL TO GASTROENTEROLOGY   5. Abdominal discomfort  R10.9 789.00 US ABD COMP         Explained to patient risk benefits of the medications. Advised patient to stop meds if having any side effects. Pt verbalized understanding of the instructions. I have discussed the diagnosis with the patient and the intended plan as seen in the above orders. The patient has received an after-visit summary and questions were answered concerning future plans.   I have discussed medication side effects and warnings with the patient as well. I have reviewed the plan of care with the patient, accepted their input and they are in agreement with the treatment goals. Reviewed plan of care. Patient has provided input and agrees with goals. Follow-up and Dispositions    · Return in about 1 month (around 7/17/2022).          Stefany Berry MD

## 2022-06-20 DIAGNOSIS — E56.9 DEFICIENCY OF MULTIPLE VITAMINS: Primary | ICD-10-CM

## 2022-06-20 NOTE — PROGRESS NOTES
Cortisol levels are normal.  PSA levels are normal.  Creatinine is slightly elevated. Avoid ibuprofen, naproxen, diclofenac. Await ultrasound abdomen results.   Keep appointment in a month

## 2022-06-21 ENCOUNTER — HOSPITAL ENCOUNTER (OUTPATIENT)
Dept: ULTRASOUND IMAGING | Age: 67
Discharge: HOME | End: 2022-06-21
Attending: INTERNAL MEDICINE
Payer: MEDICARE

## 2022-06-21 DIAGNOSIS — R10.9 ABDOMINAL DISCOMFORT: ICD-10-CM

## 2022-06-21 PROCEDURE — 76700 US EXAM ABDOM COMPLETE: CPT

## 2022-06-22 RX ORDER — THERA TABS 400 MCG
1 TAB ORAL DAILY
Qty: 90 TABLET | Refills: 0 | Status: SHIPPED | OUTPATIENT
Start: 2022-06-22 | End: 2022-09-20

## 2022-07-15 DIAGNOSIS — E78.5 HYPERLIPIDEMIA, UNSPECIFIED HYPERLIPIDEMIA TYPE: ICD-10-CM

## 2022-07-20 ENCOUNTER — OFFICE VISIT (OUTPATIENT)
Dept: PRIMARY CARE CLINIC | Age: 67
End: 2022-07-20
Payer: MEDICARE

## 2022-07-20 VITALS
WEIGHT: 112 LBS | HEART RATE: 63 BPM | TEMPERATURE: 97.3 F | HEIGHT: 65 IN | RESPIRATION RATE: 16 BRPM | SYSTOLIC BLOOD PRESSURE: 103 MMHG | BODY MASS INDEX: 18.66 KG/M2 | DIASTOLIC BLOOD PRESSURE: 68 MMHG | OXYGEN SATURATION: 100 %

## 2022-07-20 DIAGNOSIS — R79.89 T3 LOW IN SERUM: ICD-10-CM

## 2022-07-20 DIAGNOSIS — R53.83 FATIGUE, UNSPECIFIED TYPE: ICD-10-CM

## 2022-07-20 DIAGNOSIS — N28.9 RENAL INSUFFICIENCY: Primary | ICD-10-CM

## 2022-07-20 DIAGNOSIS — K82.8 GALLBLADDER SLUDGE: ICD-10-CM

## 2022-07-20 PROCEDURE — 1101F PT FALLS ASSESS-DOCD LE1/YR: CPT | Performed by: INTERNAL MEDICINE

## 2022-07-20 PROCEDURE — G8427 DOCREV CUR MEDS BY ELIG CLIN: HCPCS | Performed by: INTERNAL MEDICINE

## 2022-07-20 PROCEDURE — G8536 NO DOC ELDER MAL SCRN: HCPCS | Performed by: INTERNAL MEDICINE

## 2022-07-20 PROCEDURE — 1123F ACP DISCUSS/DSCN MKR DOCD: CPT | Performed by: INTERNAL MEDICINE

## 2022-07-20 PROCEDURE — G8420 CALC BMI NORM PARAMETERS: HCPCS | Performed by: INTERNAL MEDICINE

## 2022-07-20 PROCEDURE — G8432 DEP SCR NOT DOC, RNG: HCPCS | Performed by: INTERNAL MEDICINE

## 2022-07-20 PROCEDURE — 3017F COLORECTAL CA SCREEN DOC REV: CPT | Performed by: INTERNAL MEDICINE

## 2022-07-20 PROCEDURE — 99214 OFFICE O/P EST MOD 30 MIN: CPT | Performed by: INTERNAL MEDICINE

## 2022-07-20 NOTE — PROGRESS NOTES
Chief Complaint   Patient presents with    Weight Loss        Visit Vitals  /68 (BP 1 Location: Left upper arm, BP Patient Position: Sitting)   Pulse 63   Temp 97.3 °F (36.3 °C) (Temporal)   Resp 16   Ht 5' 5\" (1.651 m)   Wt 112 lb (50.8 kg)   SpO2 100%   BMI 18.64 kg/m²        Health Maintenance Due   Topic    COVID-19 Vaccine (3 - Booster for [place] series)    Depression Screen     Medicare Yearly Exam         1. Have you been to the ER, urgent care clinic since your last visit? Hospitalized since your last visit? No    2. Have you seen or consulted any other health care providers outside of the [address] [place] [address] since your last visit? Include any pap smears or colon screening.  No

## 2022-07-20 NOTE — PROGRESS NOTES
Barrington Pickett is a 79 y.o.  male and presents with     Chief Complaint   Patient presents with    Weight Loss     Patient is here for a follow-up visit. Patient lost weight since last year, over 20 pounds  However his weight has been steady since last visit  Lab results indicate mild renal insufficiency  Slightly low T3  Does not seem to be having any abdominal pain. Patient lives in a group home  As per caregiver patient eats well and does not have any vomiting or diarrhea  He was referred to GI for possible endoscopy and colonoscopy. Does not have an appointment yet. Ultrasound shows gallbladder sludge but no gallstones    Past Medical History:   Diagnosis Date    Asthma     PT ON DAILY INHALERS AND HAS A RESCUE INHALER    Ill-defined condition     MILD MR, AUTISM    Other ill-defined conditions(799.89)     nonverbal,uses sign language some,mental retardation    Other ill-defined conditions(799.89)     elevated cholesterol    Psychiatric disorder     MR,depression anxiety     Past Surgical History:   Procedure Laterality Date    COLONOSCOPY,DIAGNOSTIC  11/25/2014         [place] EGD TRANSORAL BIOPSY SINGLE/MULTIPLE  8/1/2013          Current Outpatient Medications   Medication Sig    therapeutic multivitamin (Thera) tablet Take 1 Tablet by mouth daily for 90 days. Denta 5000 Plus 1.1 % crea BRUSH TEETH TWICE A DAY DO NOT EAT OR DRINK FOR 30 MINUTES AFTER USING    finasteride (PROSCAR) 5 mg tablet TAKE 1 TABLET DAILY    Anti-Diarrheal, Loperamide, 2 mg tablet TAKE (1) TABLET BY MOUTH TWICE A DAY AS NEEDED FOR DIARRHEA    meclizine (ANTIVERT) 25 mg tablet Take  by mouth three (3) times daily as needed for Dizziness. acetaminophen (TYLENOL) 500 mg tablet Take 1 Tablet by mouth two (2) times daily (with meals).     Neutrogena Sensitive Skn Moist lotion APPLY TO FACE DAILY    hydrocortisone (HYTONE) 2.5 % ointment APPLY TO AFFECTED AREA ON FACE NIGHTLY AS NEEDED FOR SEBORRHEIC DERMATITIS FLARE UPS    omeprazole (PRILOSEC) 20 mg capsule TAKE 1 CAPSULE BY MOUTH DAILY AS NEEDED    OLANZapine (ZYPREXA) 5 mg tablet Take 5 mg by mouth nightly. buPROPion XL (WELLBUTRIN XL) 300 mg XL tablet Take 1 Tab by mouth every morning. FLUoxetine (PROZAC) 20 mg capsule Take 1 Cap by mouth daily. albuterol (PROVENTIL HFA, VENTOLIN HFA, PROAIR HFA) 90 mcg/actuation inhaler Take 2 Puffs by inhalation every four (4) hours as needed for Wheezing. cetirizine (ZYRTEC) 10 mg tablet Take 10 mg by mouth daily. fluticasone (FLONASE) 50 mcg/actuation nasal spray 2 Sprays by Both Nostrils route daily. azelastine (OPTIVAR) 0.05 % ophthalmic solution Administer 1 Drop to both eyes two (2) times a day. Use in affected eye(s)    fluticasone propionate (FLOVENT HFA) 110 mcg/actuation inhaler Take 2 Puffs by inhalation every twelve (12) hours. montelukast (SINGULAIR) 10 mg tablet Take 10 mg by mouth nightly. atorvastatin (LIPITOR) 10 mg tablet TAKE 1 TABLET BY MOUTH ONCE DAILY IN THE EVENING    Mapap Extra Strength 500 mg tablet TAKE 1 TABLET EVERY 6 HOURS AS NEEDED FOR HEADACHE OR FEVER (NOT TO EXCEED 06 TABS/DAY) (Patient not taking: Reported on 7/20/2022)     No current facility-administered medications for this visit.      Health Maintenance   Topic Date Due    COVID-19 Vaccine (3 - Booster for Pfizer series) 07/09/2021    Depression Screen  07/30/2022    Medicare Yearly Exam  07/31/2022    Pneumococcal 65+ years (2 - PCV) 07/30/2022    Flu Vaccine (1) 09/01/2022    Lipid Screen  02/01/2023    Colorectal Cancer Screening Combo  11/23/2024    DTaP/Tdap/Td series (2 - Td or Tdap) 11/26/2028    Hepatitis C Screening  Completed    Shingrix Vaccine Age 50>  Completed     Immunization History   Administered Date(s) Administered    COVID-19, PFIZER PURPLE top, DILUTE for use, (age 15 y+), IM, 30mcg/0.3mL 01/19/2021, 02/09/2021    Influenza Vaccine (Quad) PF (>6 Mo Flulaval, Fluarix, and >3 Yrs [place], Fluzone [id number]) 09/26/2018    Influenza Vaccine (Tri) Adjuvanted (>65 Yrs FLUAD TRI 15051) 01/17/2020    Pneumococcal Polysaccharide (PPSV-23) 07/30/2021    Tdap 11/26/2018    Zoster Recombinant 07/28/2020, 10/27/2020     No LMP for male patient. Allergies and Intolerances:   No Known Allergies    Family History:   Family History   Family history unknown: Yes       Social History:   He  reports that he has never smoked. He has never used smokeless tobacco.  He  reports no history of alcohol use. Review of Systems:   General: negative for - chills, fatigue, fever, weight change  Psych: negative for - anxiety, depression, irritability or mood swings  ENT: negative for - headaches, hearing change, nasal congestion, oral lesions, sneezing or sore throat  Heme/ Lymph: negative for - bleeding problems, bruising, pallor or swollen lymph nodes  Endo: negative for - hot flashes, polydipsia/polyuria or temperature intolerance  Resp: negative for - cough, shortness of breath or wheezing  CV: negative for - chest pain, edema or palpitations  GI: negative for - abdominal pain, change in bowel habits, constipation, diarrhea or nausea/vomiting  : negative for - dysuria, hematuria, incontinence, pelvic pain or vulvar/vaginal symptoms  MSK: negative for - joint pain, joint swelling or muscle pain  Neuro: negative for - confusion, headaches, seizures or weakness  Derm: negative for - dry skin, hair changes, rash or skin lesion changes          Physical:   Vitals:   Vitals:    07/20/22 1447   BP: 103/68   Pulse: 63   Resp: 16   Temp: 97.3 °F (36.3 °C)   TempSrc: Temporal   SpO2: 100%   Weight: 112 lb (50.8 kg)   Height: 5' 5\" (1.651 m)           Exam:   HEENT- atraumatic,normocephalic, awake, oriented, well nourished  Neck - supple,no enlarged lymph nodes, no JVD, no thyromegaly  Chest- CTA, no rhonchi, no crackles  Heart- rrr, no murmurs / gallop/rub  Abdomen- soft,BS+,NT, no hepatosplenomegaly  Ext - no c/c/edema   Neuro- no focal deficits. Power 5/5 all extremities  Skin - warm,dry, no obvious rashes. Review of Data:   LABS:   Lab Results   Component Value Date/Time    WBC 4.4 06/17/2022 10:51 AM    HGB 12.2 06/17/2022 10:51 AM    HCT 36.3 (L) 06/17/2022 10:51 AM    PLATELET 465 51/79/6606 10:51 AM     Lab Results   Component Value Date/Time    Sodium 144 06/17/2022 10:51 AM    Potassium 3.6 06/17/2022 10:51 AM    Chloride 106 06/17/2022 10:51 AM    CO2 32 06/17/2022 10:51 AM    Glucose 59 (L) 06/17/2022 10:51 AM    BUN 25 (H) 06/17/2022 10:51 AM    Creatinine 1.32 (H) 06/17/2022 10:51 AM     Lab Results   Component Value Date/Time    Cholesterol, total 120 02/01/2022 11:20 AM    HDL Cholesterol 50 02/01/2022 11:20 AM    LDL, calculated 56.6 02/01/2022 11:20 AM    Triglyceride 67 02/01/2022 11:20 AM     No components found for: GPT        Impression / Plan:        ICD-10-CM ICD-9-CM    1. Renal insufficiency  P04.7 419.0 METABOLIC PANEL, BASIC      URINALYSIS W/ RFLX MICROSCOPIC      2. T3 low in serum  R79.89 790.6 T4, FREE      T3, FREE      TSH 3RD GENERATION      3. Gallbladder sludge  K82.8 575.8       4. Fatigue, unspecified type  R53.83 780.79 T4, FREE      T3, FREE      TSH 3RD GENERATION      Asked caregiver to make appointment with GI for possible colonoscopy and endoscopy      Explained to patient risk benefits of the medications. Advised patient to stop meds if having any side effects. Pt verbalized understanding of the instructions. I have discussed the diagnosis with the patient and the intended plan as seen in the above orders. The patient has received an after-visit summary and questions were answered concerning future plans. I have discussed medication side effects and warnings with the patient as well. I have reviewed the plan of care with the patient, accepted their input and they are in agreement with the treatment goals. Reviewed plan of care. Patient has provided input and agrees with goals.     Follow-up and Dispositions Return in about 6 months (around 1/20/2023).          Prema Prieto MD

## 2022-07-25 RX ORDER — ATORVASTATIN CALCIUM 10 MG/1
TABLET, FILM COATED ORAL
Qty: 30 TABLET | Refills: 3 | OUTPATIENT
Start: 2022-07-25

## 2022-07-28 DIAGNOSIS — R79.89 LOW SERUM T4 LEVEL: ICD-10-CM

## 2022-07-28 DIAGNOSIS — R79.89 T3 LOW IN SERUM: Primary | ICD-10-CM

## 2022-08-07 RX ORDER — LOPERAMIDE HCL 2 MG
2 TABLET ORAL
Qty: 12 TABLET | Refills: 2 | Status: SHIPPED | OUTPATIENT
Start: 2022-08-07

## 2022-08-09 RX ORDER — OMEPRAZOLE 20 MG/1
20 CAPSULE, DELAYED RELEASE ORAL DAILY
Qty: 90 CAPSULE | Refills: 1 | Status: SHIPPED | OUTPATIENT
Start: 2022-08-09 | End: 2022-08-15

## 2022-08-09 NOTE — TELEPHONE ENCOUNTER
PCP: Rojelio Mukherjee MD    Last appt: 7/20/2022  Future Appointments   Date Time Provider Gemma Sharpe   1/20/2023 10:30 AM Rojelio Mukherjee MD [facility] AM[facility]       Requested Prescriptions     Pending Prescriptions Disp Refills    omeprazole (PRILOSEC) 20 mg capsule 30 Capsule PRN         Other Comments: last refill 09/09/2020

## 2022-08-15 ENCOUNTER — TELEPHONE (OUTPATIENT)
Dept: PRIMARY CARE CLINIC | Age: 67
End: 2022-08-15

## 2022-09-14 RX ORDER — MULTIVITAMIN WITH FOLIC ACID 400 MCG
TABLET ORAL
Qty: 30 TABLET | Refills: 12 | Status: SHIPPED | OUTPATIENT
Start: 2022-09-14

## 2022-09-28 RX ORDER — ACETAMINOPHEN 500 MG/1
TABLET ORAL
Qty: 60 TABLET | Refills: 11 | Status: SHIPPED | OUTPATIENT
Start: 2022-09-28

## 2022-09-28 RX ORDER — SODIUM FLUORIDE 1.1 G/100G
GEL, DENTIFRICE ORAL
Qty: 51 G | Refills: 11 | Status: SHIPPED | OUTPATIENT
Start: 2022-09-28

## 2023-01-10 ENCOUNTER — TELEPHONE (OUTPATIENT)
Dept: PRIMARY CARE CLINIC | Age: 68
End: 2023-01-10

## 2023-01-10 NOTE — TELEPHONE ENCOUNTER
----- Message from Madhavi Hines sent at 1/10/2023 10:38 AM EST -----  Subject: Referral Request    Reason for referral request? Family is requesting a complete body CT scan. Provider patient wants to be referred to(if known):     Provider Phone Number(if known): Additional Information for Provider?  [place] with the group home is   requesting it to be done before his upcoming appointment on 1/20/2023  ---------------------------------------------------------------------------  --------------  [id number]    [phone]; OK to leave message on voicemail  ---------------------------------------------------------------------------  --------------

## 2023-01-20 ENCOUNTER — OFFICE VISIT (OUTPATIENT)
Dept: PRIMARY CARE CLINIC | Age: 68
End: 2023-01-20
Payer: MEDICARE

## 2023-01-20 VITALS
RESPIRATION RATE: 16 BRPM | SYSTOLIC BLOOD PRESSURE: 115 MMHG | TEMPERATURE: 97.6 F | WEIGHT: 107 LBS | HEIGHT: 65 IN | DIASTOLIC BLOOD PRESSURE: 74 MMHG | HEART RATE: 60 BPM | BODY MASS INDEX: 17.83 KG/M2 | OXYGEN SATURATION: 96 %

## 2023-01-20 DIAGNOSIS — R79.89 LOW TESTOSTERONE IN MALE: ICD-10-CM

## 2023-01-20 DIAGNOSIS — E53.8 B12 DEFICIENCY: ICD-10-CM

## 2023-01-20 DIAGNOSIS — R23.1 PALLOR: ICD-10-CM

## 2023-01-20 DIAGNOSIS — R63.4 WEIGHT LOSS: ICD-10-CM

## 2023-01-20 DIAGNOSIS — R79.89 T3 LOW IN SERUM: Primary | ICD-10-CM

## 2023-01-20 RX ORDER — FEEDER CONTAINER WITH PUMP SET
237 EACH MISCELLANEOUS 4 TIMES DAILY
Qty: 946 ML | Refills: 4 | Status: SHIPPED | OUTPATIENT
Start: 2023-01-20

## 2023-01-20 NOTE — PROGRESS NOTES
No chief complaint on file. There were no vitals taken for this visit. 1. Have you been to the ER, urgent care clinic since your last visit? Hospitalized since your last visit? No    2. Have you seen or consulted any other health care providers outside of the [address] [place] [address] since your last visit? Include any pap smears or colon screening. No      3. For patients aged 39-70: Has the patient had a colonoscopy / FIT/ Cologuard? Yes - Care Gap present.  Most recent result on file

## 2023-01-20 NOTE — PROGRESS NOTES
Reddy Florez is a 79 y.o.  male and presents with     Chief Complaint   Patient presents with    Follow-up     Patient lives in a group home. He is accompanied by caregiver  As per caregiver patient has been eating well. Has not had any abdominal pain nausea vomiting or diarrhea  No rectal bleeding  No falls  Has not been having any cough  Has appointment with gastroenterologist in June  Caregiver has a sister who occasionally called the group home  Patient is nonverbal and cannot give any history  As per caregiver patient does not choke on food      Past Medical History:   Diagnosis Date    Asthma     PT ON DAILY INHALERS AND HAS A RESCUE INHALER    Ill-defined condition     MILD MR, AUTISM    Other ill-defined conditions(799.89)     nonverbal,uses sign language some,mental retardation    Other ill-defined conditions(799.89)     elevated cholesterol    Psychiatric disorder     MR,depression anxiety     Past Surgical History:   Procedure Laterality Date    COLONOSCOPY,DIAGNOSTIC  11/25/2014         [place] EGD TRANSORAL BIOPSY SINGLE/MULTIPLE  8/1/2013          Current Outpatient Medications   Medication Sig    food supplemt, lactose-reduced (Ensure Active High Protein) liqd Take 237 mL by mouth four (4) times daily. Pain Relief Extra Strength 500 mg tablet TAKE 1 TABLET EVERY 6 HOURS AS NEEDED FOR HEADACHE OR FEVER (NOT TO EXCEED 6 TABS/DAY)    Denta 5000 Plus 1.1 % crea BRUSH TEETH TWICE A DAY DO NOT EAT OR DRINK FOR 30 MINUTES AFTER USING    High Potency Multivitamin 400 mcg tab tablet TAKE 1 TABLET BY MOUTH DAILY    omeprazole (PRILOSEC) 20 mg capsule TAKE 1 CAPSULE BY MOUTH DAILY AS NEEDED    finasteride (PROSCAR) 5 mg tablet TAKE 1 TABLET DAILY    loperamide (Anti-Diarrheal, Loperamide,) 2 mg tablet Take 1 Tablet by mouth four (4) times daily as needed for Diarrhea.     atorvastatin (LIPITOR) 10 mg tablet TAKE 1 TABLET BY MOUTH ONCE DAILY IN THE EVENING    meclizine (ANTIVERT) 25 mg tablet Take  by mouth three (3) times daily as needed for Dizziness. acetaminophen (TYLENOL) 500 mg tablet Take 1 Tablet by mouth two (2) times daily (with meals). Neutrogena Sensitive Skn Moist lotion APPLY TO FACE DAILY    hydrocortisone (HYTONE) 2.5 % ointment APPLY TO AFFECTED AREA ON FACE NIGHTLY AS NEEDED FOR SEBORRHEIC DERMATITIS FLARE UPS    Mapap Extra Strength 500 mg tablet TAKE 1 TABLET EVERY 6 HOURS AS NEEDED FOR HEADACHE OR FEVER (NOT TO EXCEED 06 TABS/DAY)    OLANZapine (ZYPREXA) 5 mg tablet Take 5 mg by mouth nightly. buPROPion XL (WELLBUTRIN XL) 300 mg XL tablet Take 1 Tab by mouth every morning. FLUoxetine (PROZAC) 20 mg capsule Take 1 Cap by mouth daily. albuterol (PROVENTIL HFA, VENTOLIN HFA, PROAIR HFA) 90 mcg/actuation inhaler Take 2 Puffs by inhalation every four (4) hours as needed for Wheezing. cetirizine (ZYRTEC) 10 mg tablet Take 10 mg by mouth daily. fluticasone (FLONASE) 50 mcg/actuation nasal spray 2 Sprays by Both Nostrils route daily. azelastine (OPTIVAR) 0.05 % ophthalmic solution Administer 1 Drop to both eyes two (2) times a day. Use in affected eye(s)    fluticasone propionate (FLOVENT HFA) 110 mcg/actuation inhaler Take 2 Puffs by inhalation every twelve (12) hours. montelukast (SINGULAIR) 10 mg tablet Take 10 mg by mouth nightly. No current facility-administered medications for this visit.      Health Maintenance   Topic Date Due    COVID-19 Vaccine (3 - Booster for Pfizer series) 04/06/2021    Depression Screen  07/30/2022    Medicare Yearly Exam  07/31/2022    Flu Vaccine (1) 08/01/2022    Lipid Screen  02/01/2023    Colorectal Cancer Screening Combo  11/23/2024    DTaP/Tdap/Td series (2 - Td or Tdap) 11/26/2028    Hepatitis C Screening  Completed    Shingles Vaccine  Completed    Pneumococcal 65+ years  Completed     Immunization History   Administered Date(s) Administered    COVID-19, PFIZER PURPLE top, DILUTE for use, (age 15 y+), IM, 30mcg/0.3mL 01/19/2021, 02/09/2021 Influenza Vaccine (Tri) Adjuvanted (>65 Yrs FLUAD TRI 77146) 01/17/2020    Influenza, FLUARIX, FLULAVAL, FLUZONE (age 10 mo+) AND AFLURIA, (age 1 y+), PF, 0.5mL 09/26/2018    Pneumococcal Polysaccharide (PPSV-23) 07/30/2021    Tdap 11/26/2018    Zoster Recombinant 07/28/2020, 10/27/2020     No LMP for male patient. Allergies and Intolerances:   No Known Allergies    Family History:   Family History   Family history unknown: Yes       Social History:   He  reports that he has never smoked. He has never used smokeless tobacco.  He  reports no history of alcohol use.             Review of Systems:   General: negative for - chills, fatigue, fever, weight change  Psych: negative for - anxiety, depression, irritability or mood swings  ENT: negative for - headaches, hearing change, nasal congestion, oral lesions, sneezing or sore throat  Heme/ Lymph: negative for - bleeding problems, bruising, pallor or swollen lymph nodes  Endo: negative for - hot flashes, polydipsia/polyuria or temperature intolerance  Resp: negative for - cough, shortness of breath or wheezing  CV: negative for - chest pain, edema or palpitations  GI: negative for - abdominal pain, change in bowel habits, constipation, diarrhea or nausea/vomiting  : negative for - dysuria, hematuria, incontinence, pelvic pain or vulvar/vaginal symptoms  MSK: negative for - joint pain, joint swelling or muscle pain  Neuro: negative for - confusion, headaches, seizures or weakness  Derm: negative for - dry skin, hair changes, rash or skin lesion changes          Physical:   Vitals:   Vitals:    01/20/23 1052   BP: 115/74   Pulse: 60   Resp: 16   Temp: 97.6 °F (36.4 °C)   TempSrc: Temporal   SpO2: 96%   Weight: 107 lb (48.5 kg)   Height: 5' 5\" (1.651 m)           Exam:   HEENT- atraumatic,normocephalic, awake, oriented, asthenic, nonverbal  Neck - supple,no enlarged lymph nodes, no JVD, no thyromegaly  Chest- CTA, no rhonchi, no crackles  Heart- rrr, no murmurs / gallop/rub  Abdomen- soft,BS+,NT, no hepatosplenomegaly  Ext - no c/c/edema   Neuro- no focal deficits. Power 5/5 all extremities  Skin - warm,dry, no obvious rashes. Review of Data:   LABS:   Lab Results   Component Value Date/Time    WBC 4.4 06/17/2022 10:51 AM    HGB 12.2 06/17/2022 10:51 AM    HCT 36.3 (L) 06/17/2022 10:51 AM    PLATELET 045 71/52/6662 10:51 AM     Lab Results   Component Value Date/Time    Sodium 144 07/20/2022 03:31 PM    Potassium 3.9 07/20/2022 03:31 PM    Chloride 106 07/20/2022 03:31 PM    CO2 33 (H) 07/20/2022 03:31 PM    Glucose 71 07/20/2022 03:31 PM    BUN 25 (H) 07/20/2022 03:31 PM    Creatinine 1.16 07/20/2022 03:31 PM     Lab Results   Component Value Date/Time    Cholesterol, total 120 02/01/2022 11:20 AM    HDL Cholesterol 50 02/01/2022 11:20 AM    LDL, calculated 56.6 02/01/2022 11:20 AM    Triglyceride 67 02/01/2022 11:20 AM     No components found for: GPT        Impression / Plan:        ICD-10-CM ICD-9-CM    1. T3 low in serum  R79.89 790.6 TSH 3RD GENERATION      T3, FREE      T4, FREE      REFERRAL TO ENDOCRINOLOGY      2. Weight loss  R63.4 783.21 CORTISOL, AM      INSULIN-LIKE GROWTH FACTOR 1      TSH 3RD GENERATION      T3, FREE      T4, FREE      REFERRAL TO ENDOCRINOLOGY      food supplemt, lactose-reduced (Ensure Active High Protein) liqd      3. Low testosterone in male  R74.80 790.99 [address] AND [facility]      PROLACTIN      TESTOSTERONE, FREE & TOTAL      4. Pallor  R23.1 782.61       5. B12 deficiency  E53.8 266.2 VITAMIN B12 & FOLATE      Unclear as to why patient is losing weight despite eating plenty. So far no underlying cause for weight loss has been found  Will get opinion from gastroenterologist and endocrinologist      Explained to patient risk benefits of the medications. Advised patient to stop meds if having any side effects. Pt verbalized understanding of the instructions.     I have discussed the diagnosis with the patient and the intended plan as seen in the above orders. The patient has received an after-visit summary and questions were answered concerning future plans. I have discussed medication side effects and warnings with the patient as well. I have reviewed the plan of care with the patient, accepted their input and they are in agreement with the treatment goals. Reviewed plan of care. Patient has provided input and agrees with goals. Follow-up and Dispositions    Return in about 3 months (around 4/20/2023).          Park Umaña MD

## 2023-01-23 ENCOUNTER — TELEPHONE (OUTPATIENT)
Dept: PRIMARY CARE CLINIC | Age: 68
End: 2023-01-23

## 2023-01-23 NOTE — TELEPHONE ENCOUNTER
----- Message from Leyla Valladares sent at 1/23/2023 10:20 AM EST -----  Subject: Medication Problem    Medication: food supplemt, lactose-reduced (Ensure Active High Protein)   liqd  Dosage: food supplemt, lactose-reduced (Ensure Active High Protein) liqd  Ordering Provider: Luz Marina Ny    Question/Problem: Pt's insurance does not cover Ensure. Is it possible to   prescribe another high protein liquid that would be covered. [facility]: [facility]    ---------------------------------------------------------------------------  --------------  Nichole PARRY  [phone]; OK to leave message on voicemail  ---------------------------------------------------------------------------  --------------    SCRIPT ANSWERS  Relationship to Patient: Third Party  Third Party Type:  Other  Other Third Party Type: Mental Health   Representative Name: [place] oLbo

## 2023-01-23 NOTE — TELEPHONE ENCOUNTER
Per Juani Hernández with [address], patient insurance will not cover any high protein liquid. They will shahzad to pay out of pocket.

## 2023-02-28 ENCOUNTER — TELEPHONE (OUTPATIENT)
Dept: PRIMARY CARE CLINIC | Age: 68
End: 2023-02-28

## 2023-02-28 NOTE — TELEPHONE ENCOUNTER
----- Message from [facility] sent at 2/27/2023  3:41 PM EST -----  Subject: Medication Problem    Medication: food supplemt, lactose-reduced (Ensure Active High Protein)   liqd  Dosage: drink one container by mouth 4 times a day   Ordering Provider: kulwinder    Question/Problem: Patient's care taker Tonya Emery called in to let the   patient's provider know that his insurance does not cover the ensure that   was prescribed. She would like for the patient's provider to prescribe him   something that his insurance would cover.  Please contac tto further   assist.       Pharmacy: [id number] [place]    ---------------------------------------------------------------------------  --------------  Ana PARRY  [phone]; OK to leave message on voicemail  ---------------------------------------------------------------------------  --------------    SCRIPT ANSWERS  Relationship to Patient: Self

## 2023-03-08 ENCOUNTER — TELEPHONE (OUTPATIENT)
Dept: PRIMARY CARE CLINIC | Age: 68
End: 2023-03-08

## 2023-03-22 ENCOUNTER — TELEPHONE (OUTPATIENT)
Dept: PRIMARY CARE CLINIC | Age: 68
End: 2023-03-22

## 2023-04-26 ENCOUNTER — OFFICE VISIT (OUTPATIENT)
Dept: PRIMARY CARE CLINIC | Age: 68
End: 2023-04-26
Payer: MEDICARE

## 2023-04-26 ENCOUNTER — HOSPITAL ENCOUNTER (OUTPATIENT)
Dept: CT IMAGING | Age: 68
Discharge: HOME | End: 2023-04-26
Attending: INTERNAL MEDICINE
Payer: MEDICARE

## 2023-04-26 ENCOUNTER — HOSPITAL ENCOUNTER (OUTPATIENT)
Dept: GENERAL RADIOLOGY | Age: 68
Discharge: HOME | End: 2023-04-26
Attending: INTERNAL MEDICINE
Payer: MEDICARE

## 2023-04-26 VITALS
HEART RATE: 66 BPM | TEMPERATURE: 98.8 F | WEIGHT: 102 LBS | BODY MASS INDEX: 17 KG/M2 | SYSTOLIC BLOOD PRESSURE: 108 MMHG | RESPIRATION RATE: 16 BRPM | DIASTOLIC BLOOD PRESSURE: 69 MMHG | OXYGEN SATURATION: 100 % | HEIGHT: 65 IN

## 2023-04-26 DIAGNOSIS — R63.4 UNEXPLAINED WEIGHT LOSS: ICD-10-CM

## 2023-04-26 DIAGNOSIS — E53.8 B12 DEFICIENCY: ICD-10-CM

## 2023-04-26 DIAGNOSIS — N39.41 BENIGN PROSTATIC HYPERPLASIA (BPH) WITH URINARY URGE INCONTINENCE: Primary | ICD-10-CM

## 2023-04-26 DIAGNOSIS — D64.9 ANEMIA, UNSPECIFIED TYPE: ICD-10-CM

## 2023-04-26 DIAGNOSIS — N40.1 BENIGN PROSTATIC HYPERPLASIA (BPH) WITH URINARY URGE INCONTINENCE: Primary | ICD-10-CM

## 2023-04-26 DIAGNOSIS — E78.5 HYPERLIPIDEMIA, UNSPECIFIED HYPERLIPIDEMIA TYPE: ICD-10-CM

## 2023-04-26 LAB
CREAT BLD-MCNC: 1.3 MG/DL (ref 0.6–1.3)
FOLATE SERPL-MCNC: 70.5 NG/ML (ref 5–21)
VIT B12 SERPL-MCNC: 798 PG/ML (ref 193–986)

## 2023-04-26 PROCEDURE — 82565 ASSAY OF CREATININE: CPT

## 2023-04-26 PROCEDURE — 1123F ACP DISCUSS/DSCN MKR DOCD: CPT | Performed by: INTERNAL MEDICINE

## 2023-04-26 PROCEDURE — G8427 DOCREV CUR MEDS BY ELIG CLIN: HCPCS | Performed by: INTERNAL MEDICINE

## 2023-04-26 PROCEDURE — G8536 NO DOC ELDER MAL SCRN: HCPCS | Performed by: INTERNAL MEDICINE

## 2023-04-26 PROCEDURE — 99214 OFFICE O/P EST MOD 30 MIN: CPT | Performed by: INTERNAL MEDICINE

## 2023-04-26 PROCEDURE — 74177 CT ABD & PELVIS W/CONTRAST: CPT

## 2023-04-26 PROCEDURE — G8432 DEP SCR NOT DOC, RNG: HCPCS | Performed by: INTERNAL MEDICINE

## 2023-04-26 PROCEDURE — 74011000636 HC RX REV CODE- 636: Performed by: INTERNAL MEDICINE

## 2023-04-26 PROCEDURE — 3017F COLORECTAL CA SCREEN DOC REV: CPT | Performed by: INTERNAL MEDICINE

## 2023-04-26 PROCEDURE — 1101F PT FALLS ASSESS-DOCD LE1/YR: CPT | Performed by: INTERNAL MEDICINE

## 2023-04-26 PROCEDURE — G8419 CALC BMI OUT NRM PARAM NOF/U: HCPCS | Performed by: INTERNAL MEDICINE

## 2023-04-26 PROCEDURE — 71046 X-RAY EXAM CHEST 2 VIEWS: CPT

## 2023-04-26 RX ADMIN — IOPAMIDOL 100 ML: 755 INJECTION, SOLUTION INTRAVENOUS at 11:53

## 2023-04-26 NOTE — PROGRESS NOTES
Chief Complaint   Patient presents with    Weight Loss         Visit Vitals  /69 (BP 1 Location: Right upper arm, BP Patient Position: Sitting)   Pulse 66   Temp 98.8 °F (37.1 °C) (Oral)   Resp 16   Ht 5' 5\" (1.651 m)   Wt 102 lb (46.3 kg)   SpO2 100%   BMI 16.97 kg/m²        Health Maintenance Due   Topic    Medicare Yearly Exam     Lipid Screen         1. \"Have you been to the ER, urgent care clinic since your last visit? Hospitalized since your last visit? \" No    2. \"Have you seen or consulted any other health care providers outside of the [address] [place] [address] since your last visit? \" No     3. For patients aged 39-70: Has the patient had a colonoscopy / FIT/ Cologuard? Yes - no Care Gap present      If the patient is female:    4. For patients aged 41-77: Has the patient had a mammogram within the past 2 years? NA - based on age or sex      11. For patients aged 21-65: Has the patient had a pap smear?  NA - based on age or sex

## 2023-04-26 NOTE — PROGRESS NOTES
Chintan Srivastava is a 76 y.o.  male and presents with     Chief Complaint   Patient presents with    Weight Loss     Pt is accompanied by care giver. Pt unable to give any history. Care giver reports no symptom except for wt loss  Eating well. Pt has been loosing wt. Pt has appt with GI in June for EGD and colonscopy. could not get it sooner  HAd appt with ENdocrinology but it was canceleed. Apparentyl pt has been eating well. NO choking or cough. NO dysphagia . Infact pt has been eating well per care giver. HAs BM daily. NO dark stools  Pt does see Psychiatrist.    Past Medical History:   Diagnosis Date    Asthma     PT ON DAILY INHALERS AND HAS A RESCUE INHALER    Ill-defined condition     MILD MR, AUTISM    Other ill-defined conditions(799.89)     nonverbal,uses sign language some,mental retardation    Other ill-defined conditions(799.89)     elevated cholesterol    Psychiatric disorder     MR,depression anxiety     Past Surgical History:   Procedure Laterality Date    COLONOSCOPY,DIAGNOSTIC  11/25/2014         [place] EGD TRANSORAL BIOPSY SINGLE/MULTIPLE  8/1/2013          Current Outpatient Medications   Medication Sig    finasteride (PROSCAR) 5 mg tablet TAKE 1 TABLET DAILY    food supplemt, lactose-reduced (Ensure Active High Protein) liqd Take 237 mL by mouth four (4) times daily. Pain Relief Extra Strength 500 mg tablet TAKE 1 TABLET EVERY 6 HOURS AS NEEDED FOR HEADACHE OR FEVER (NOT TO EXCEED 6 TABS/DAY)    Denta 5000 Plus 1.1 % crea BRUSH TEETH TWICE A DAY DO NOT EAT OR DRINK FOR 30 MINUTES AFTER USING    High Potency Multivitamin 400 mcg tab tablet TAKE 1 TABLET BY MOUTH DAILY    omeprazole (PRILOSEC) 20 mg capsule TAKE 1 CAPSULE BY MOUTH DAILY AS NEEDED    loperamide (Anti-Diarrheal, Loperamide,) 2 mg tablet Take 1 Tablet by mouth four (4) times daily as needed for Diarrhea.     atorvastatin (LIPITOR) 10 mg tablet TAKE 1 TABLET BY MOUTH ONCE DAILY IN THE EVENING    meclizine (ANTIVERT) 25 mg tablet Take by mouth three (3) times daily as needed for Dizziness. acetaminophen (TYLENOL) 500 mg tablet Take 1 Tablet by mouth two (2) times daily (with meals). Neutrogena Sensitive Skn Moist lotion APPLY TO FACE DAILY    hydrocortisone (HYTONE) 2.5 % ointment APPLY TO AFFECTED AREA ON FACE NIGHTLY AS NEEDED FOR SEBORRHEIC DERMATITIS FLARE UPS    Mapap Extra Strength 500 mg tablet TAKE 1 TABLET EVERY 6 HOURS AS NEEDED FOR HEADACHE OR FEVER (NOT TO EXCEED 06 TABS/DAY)    OLANZapine (ZYPREXA) 5 mg tablet Take 5 mg by mouth nightly. buPROPion XL (WELLBUTRIN XL) 300 mg XL tablet Take 1 Tab by mouth every morning. FLUoxetine (PROZAC) 20 mg capsule Take 1 Cap by mouth daily. albuterol (PROVENTIL HFA, VENTOLIN HFA, PROAIR HFA) 90 mcg/actuation inhaler Take 2 Puffs by inhalation every four (4) hours as needed for Wheezing. cetirizine (ZYRTEC) 10 mg tablet Take 10 mg by mouth daily. fluticasone (FLONASE) 50 mcg/actuation nasal spray 2 Sprays by Both Nostrils route daily. azelastine (OPTIVAR) 0.05 % ophthalmic solution Administer 1 Drop to both eyes two (2) times a day. Use in affected eye(s)    fluticasone propionate (FLOVENT HFA) 110 mcg/actuation inhaler Take 2 Puffs by inhalation every twelve (12) hours. montelukast (SINGULAIR) 10 mg tablet Take 10 mg by mouth nightly. No current facility-administered medications for this visit.      Health Maintenance   Topic Date Due    Medicare Yearly Exam  07/31/2022    Lipid Screen  02/01/2023    COVID-19 Vaccine (3 - Booster for Pfizer series) 04/26/2024 (Originally 4/6/2021)    Flu Vaccine (Season Ended) 08/01/2023    Depression Screen  01/20/2024    Colorectal Cancer Screening Combo  11/23/2024    DTaP/Tdap/Td series (2 - Td or Tdap) 11/26/2028    Hepatitis C Screening  Completed    Shingles Vaccine  Completed    Pneumococcal 65+ years  Completed     Immunization History   Administered Date(s) Administered    COVID-19, PFIZER PURPLE top, DILUTE for use, (age 15 y+), IM, 30mcg/0.3mL 01/19/2021, 02/09/2021    Influenza Vaccine (Tri) Adjuvanted (>65 Yrs FLUAD TRI 50191) 01/17/2020    Influenza, FLUARIX, FLULAVAL, FLUZONE (age 10 mo+) AND AFLURIA, (age 1 y+), PF, 0.5mL 09/26/2018    Pneumococcal Polysaccharide (PPSV-23) 07/30/2021    Tdap 11/26/2018    Zoster Recombinant 07/28/2020, 10/27/2020     No LMP for male patient. Allergies and Intolerances:   No Known Allergies    Family History:   Family History   Family history unknown: Yes       Social History:   He  reports that he has never smoked. He has never used smokeless tobacco.  He  reports no history of alcohol use.             Review of Systems:   General: negative for - chills, fatigue, fever, weight change  Psych: negative for - anxiety, depression, irritability or mood swings  ENT: negative for - headaches, hearing change, nasal congestion, oral lesions, sneezing or sore throat  Heme/ Lymph: negative for - bleeding problems, bruising, pallor or swollen lymph nodes  Endo: negative for - hot flashes, polydipsia/polyuria or temperature intolerance  Resp: negative for - cough, shortness of breath or wheezing  CV: negative for - chest pain, edema or palpitations  GI: negative for - abdominal pain, change in bowel habits, constipation, diarrhea or nausea/vomiting  : negative for - dysuria, hematuria, incontinence, pelvic pain or vulvar/vaginal symptoms  MSK: negative for - joint pain, joint swelling or muscle pain  Neuro: negative for - confusion, headaches, seizures or weakness  Derm: negative for - dry skin, hair changes, rash or skin lesion changes          Physical:   Vitals:   Vitals:    04/26/23 1000   BP: 108/69   Pulse: 66   Resp: 16   Temp: 98.8 °F (37.1 °C)   TempSrc: Oral   SpO2: 100%   Weight: 102 lb (46.3 kg)   Height: 5' 5\" (1.651 m)           Exam:   HEENT- atraumatic,normocephalic, awake, oriented, thin, no distress  Neck - supple,no enlarged lymph nodes, no JVD, no thyromegaly  Chest- CTA, no rhonchi, no crackles  Heart- rrr, no murmurs / gallop/rub  Abdomen- soft,BS+,NT, no hepatosplenomegaly  Ext - no c/c/edema   Neuro- no focal deficits. Power 5/5 all extremities  Skin - warm,dry, no obvious rashes. Review of Data:   LABS:   Lab Results   Component Value Date/Time    WBC 4.4 06/17/2022 10:51 AM    HGB 12.2 06/17/2022 10:51 AM    HCT 36.3 (L) 06/17/2022 10:51 AM    PLATELET 393 74/09/4422 10:51 AM     Lab Results   Component Value Date/Time    Sodium 144 07/20/2022 03:31 PM    Potassium 3.9 07/20/2022 03:31 PM    Chloride 106 07/20/2022 03:31 PM    CO2 33 (H) 07/20/2022 03:31 PM    Glucose 71 07/20/2022 03:31 PM    BUN 25 (H) 07/20/2022 03:31 PM    Creatinine 1.16 07/20/2022 03:31 PM     Lab Results   Component Value Date/Time    Cholesterol, total 120 02/01/2022 11:20 AM    HDL Cholesterol 50 02/01/2022 11:20 AM    LDL, calculated 56.6 02/01/2022 11:20 AM    Triglyceride 67 02/01/2022 11:20 AM     No components found for: GPT        Impression / Plan:        ICD-10-CM ICD-9-CM    1. Benign prostatic hyperplasia (BPH) with urinary urge incontinence  N40.1 600.01     N39.41 788.31       2. Hyperlipidemia, unspecified hyperlipidemia type  E78.5 272.4       3. Unexplained weight loss  R63.4 783.21 XR CHEST PA LAT      METABOLIC PANEL, COMPREHENSIVE      CANCELED: CT ABDOMEN W WO CONT AND PELVIS W CONT      4. Anemia, unspecified type  D64.9 285.9 CBC WITH AUTOMATED DIFF      IRON PROFILE      FERRITIN      5. B12 deficiency  V35.6 811.6 METABOLIC PANEL, COMPREHENSIVE      VITAMIN B12 & FOLATE      CT chest - not covered by insurance, tried ordering it. R/o occult malignancy. Sent message to GI for earlier appt for EGD , colonscopy  Asked caregiver to call Endocrinology for wt loss  Etiology for wt loss unclear as there are no localizing signs      Explained to patient risk benefits of the medications. Advised patient to stop meds if having any side effects. Pt verbalized understanding of the instructions. I have discussed the diagnosis with the patient and the intended plan as seen in the above orders. The patient has received an after-visit summary and questions were answered concerning future plans. I have discussed medication side effects and warnings with the patient as well. I have reviewed the plan of care with the patient, accepted their input and they are in agreement with the treatment goals. Reviewed plan of care. Patient has provided input and agrees with goals. Follow-up and Dispositions    Return in about 6 weeks (around 6/7/2023).          Claudy Gomez MD

## 2023-04-27 DIAGNOSIS — K59.09 OTHER CONSTIPATION: Primary | ICD-10-CM

## 2023-04-27 LAB
ALBUMIN SERPL-MCNC: 3.6 G/DL (ref 3.5–5)
ALBUMIN/GLOB SERPL: 1 (ref 1.1–2.2)
ALP SERPL-CCNC: 99 U/L (ref 45–117)
ALT SERPL-CCNC: 48 U/L (ref 12–78)
ANION GAP SERPL CALC-SCNC: 3 MMOL/L (ref 5–15)
AST SERPL-CCNC: 26 U/L (ref 15–37)
BASOPHILS # BLD: 0 K/UL (ref 0–0.1)
BASOPHILS NFR BLD: 0 % (ref 0–1)
BILIRUB SERPL-MCNC: 0.3 MG/DL (ref 0.2–1)
BUN SERPL-MCNC: 38 MG/DL (ref 6–20)
BUN/CREAT SERPL: 31 (ref 12–20)
CALCIUM SERPL-MCNC: 9.4 MG/DL (ref 8.5–10.1)
CHLORIDE SERPL-SCNC: 110 MMOL/L (ref 97–108)
CO2 SERPL-SCNC: 30 MMOL/L (ref 21–32)
CREAT SERPL-MCNC: 1.21 MG/DL (ref 0.7–1.3)
DIFFERENTIAL METHOD BLD: ABNORMAL
EOSINOPHIL # BLD: 0 K/UL (ref 0–0.4)
EOSINOPHIL NFR BLD: 0 % (ref 0–7)
ERYTHROCYTE [DISTWIDTH] IN BLOOD BY AUTOMATED COUNT: 13.7 % (ref 11.5–14.5)
FERRITIN SERPL-MCNC: 250 NG/ML (ref 26–388)
GLOBULIN SER CALC-MCNC: 3.7 G/DL (ref 2–4)
GLUCOSE SERPL-MCNC: 96 MG/DL (ref 65–100)
HCT VFR BLD AUTO: 36.7 % (ref 36.6–50.3)
HGB BLD-MCNC: 11.7 G/DL (ref 12.1–17)
IMM GRANULOCYTES # BLD AUTO: 0 K/UL (ref 0–0.04)
IMM GRANULOCYTES NFR BLD AUTO: 0 % (ref 0–0.5)
IRON SATN MFR SERPL: 14 % (ref 20–50)
IRON SERPL-MCNC: 31 UG/DL (ref 35–150)
LYMPHOCYTES # BLD: 1.6 K/UL (ref 0.8–3.5)
LYMPHOCYTES NFR BLD: 20 % (ref 12–49)
MCH RBC QN AUTO: 33.1 PG (ref 26–34)
MCHC RBC AUTO-ENTMCNC: 31.9 G/DL (ref 30–36.5)
MCV RBC AUTO: 104 FL (ref 80–99)
MONOCYTES # BLD: 0.5 K/UL (ref 0–1)
MONOCYTES NFR BLD: 6 % (ref 5–13)
NEUTS SEG # BLD: 5.9 K/UL (ref 1.8–8)
NEUTS SEG NFR BLD: 74 % (ref 32–75)
NRBC # BLD: 0 K/UL (ref 0–0.01)
NRBC BLD-RTO: 0 PER 100 WBC
PLATELET # BLD AUTO: 201 K/UL (ref 150–400)
PMV BLD AUTO: 11.3 FL (ref 8.9–12.9)
POTASSIUM SERPL-SCNC: 4.1 MMOL/L (ref 3.5–5.1)
PROT SERPL-MCNC: 7.3 G/DL (ref 6.4–8.2)
RBC # BLD AUTO: 3.53 M/UL (ref 4.1–5.7)
SODIUM SERPL-SCNC: 143 MMOL/L (ref 136–145)
TIBC SERPL-MCNC: 221 UG/DL (ref 250–450)
WBC # BLD AUTO: 8 K/UL (ref 4.1–11.1)

## 2023-04-27 RX ORDER — LACTULOSE 10 G/15ML
10 SOLUTION ORAL; RECTAL
Qty: 480 ML | Refills: 2 | Status: SHIPPED | OUTPATIENT
Start: 2023-04-27

## 2023-04-28 NOTE — PROGRESS NOTES
CT scan shows constipation and small hemagioma that is not cancerous.   Sent lactulose to pharmacy for constipation

## 2023-05-01 ENCOUNTER — TELEPHONE (OUTPATIENT)
Dept: PRIMARY CARE CLINIC | Age: 68
End: 2023-05-01

## 2023-05-01 NOTE — TELEPHONE ENCOUNTER
Roly Aleman the patient  is calling about medication sent to the pharmacy and the results of imaging.  Please call [phone]

## 2023-05-08 ENCOUNTER — TELEPHONE (OUTPATIENT)
Dept: PRIMARY CARE CLINIC | Facility: CLINIC | Age: 68
End: 2023-05-08

## 2023-05-08 NOTE — TELEPHONE ENCOUNTER
Rc Ahuja with group home stated patient is having vertigo, no available appointment for today, advised to take him to the ER.

## 2023-05-14 RX ORDER — FINASTERIDE 5 MG/1
TABLET, FILM COATED ORAL
Qty: 31 TABLET | Refills: 0 | Status: SHIPPED | OUTPATIENT
Start: 2023-05-14 | End: 2023-06-13

## 2023-05-18 ENCOUNTER — ANESTHESIA (OUTPATIENT)
Facility: HOSPITAL | Age: 68
End: 2023-05-18
Payer: MEDICARE

## 2023-05-18 ENCOUNTER — ANESTHESIA EVENT (OUTPATIENT)
Facility: HOSPITAL | Age: 68
End: 2023-05-18
Payer: MEDICARE

## 2023-05-18 ENCOUNTER — HOSPITAL ENCOUNTER (OUTPATIENT)
Facility: HOSPITAL | Age: 68
Setting detail: OUTPATIENT SURGERY
Discharge: HOME | End: 2023-05-18
Attending: INTERNAL MEDICINE | Admitting: INTERNAL MEDICINE
Payer: MEDICARE

## 2023-05-18 VITALS
SYSTOLIC BLOOD PRESSURE: 155 MMHG | BODY MASS INDEX: 17 KG/M2 | HEART RATE: 56 BPM | DIASTOLIC BLOOD PRESSURE: 80 MMHG | TEMPERATURE: 97.3 F | RESPIRATION RATE: 17 BRPM | WEIGHT: 102 LBS | HEIGHT: 65 IN | OXYGEN SATURATION: 100 %

## 2023-05-18 PROCEDURE — 2500000003 HC RX 250 WO HCPCS: Performed by: NURSE ANESTHETIST, CERTIFIED REGISTERED

## 2023-05-18 PROCEDURE — 7100000010 HC PHASE II RECOVERY - FIRST 15 MIN: Performed by: INTERNAL MEDICINE

## 2023-05-18 PROCEDURE — 88305 TISSUE EXAM BY PATHOLOGIST: CPT

## 2023-05-18 PROCEDURE — 3700000001 HC ADD 15 MINUTES (ANESTHESIA): Performed by: INTERNAL MEDICINE

## 2023-05-18 PROCEDURE — 3600007512: Performed by: INTERNAL MEDICINE

## 2023-05-18 PROCEDURE — 3700000000 HC ANESTHESIA ATTENDED CARE: Performed by: INTERNAL MEDICINE

## 2023-05-18 PROCEDURE — 6360000002 HC RX W HCPCS: Performed by: NURSE ANESTHETIST, CERTIFIED REGISTERED

## 2023-05-18 PROCEDURE — 7100000011 HC PHASE II RECOVERY - ADDTL 15 MIN: Performed by: INTERNAL MEDICINE

## 2023-05-18 PROCEDURE — 2709999900 HC NON-CHARGEABLE SUPPLY: Performed by: INTERNAL MEDICINE

## 2023-05-18 PROCEDURE — 2580000003 HC RX 258: Performed by: INTERNAL MEDICINE

## 2023-05-18 PROCEDURE — 3600007502: Performed by: INTERNAL MEDICINE

## 2023-05-18 RX ORDER — LIDOCAINE HYDROCHLORIDE 20 MG/ML
INJECTION, SOLUTION EPIDURAL; INFILTRATION; INTRACAUDAL; PERINEURAL PRN
Status: DISCONTINUED | OUTPATIENT
Start: 2023-05-18 | End: 2023-05-18 | Stop reason: SDUPTHER

## 2023-05-18 RX ORDER — SODIUM CHLORIDE 0.9 % (FLUSH) 0.9 %
5-40 SYRINGE (ML) INJECTION EVERY 12 HOURS SCHEDULED
Status: DISCONTINUED | OUTPATIENT
Start: 2023-05-18 | End: 2023-05-18 | Stop reason: HOSPADM

## 2023-05-18 RX ORDER — SODIUM CHLORIDE 9 MG/ML
25 INJECTION, SOLUTION INTRAVENOUS PRN
Status: DISCONTINUED | OUTPATIENT
Start: 2023-05-18 | End: 2023-05-18 | Stop reason: HOSPADM

## 2023-05-18 RX ORDER — SODIUM CHLORIDE 0.9 % (FLUSH) 0.9 %
5-40 SYRINGE (ML) INJECTION PRN
Status: DISCONTINUED | OUTPATIENT
Start: 2023-05-18 | End: 2023-05-18 | Stop reason: HOSPADM

## 2023-05-18 RX ORDER — POLYETHYLENE GLYCOL 3350, SODIUM SULFATE ANHYDROUS, SODIUM BICARBONATE, SODIUM CHLORIDE, POTASSIUM CHLORIDE 236; 22.74; 6.74; 5.86; 2.97 G/4L; G/4L; G/4L; G/4L; G/4L
POWDER, FOR SOLUTION ORAL
COMMUNITY
Start: 2023-05-15

## 2023-05-18 RX ORDER — SODIUM CHLORIDE 9 MG/ML
INJECTION, SOLUTION INTRAVENOUS CONTINUOUS
Status: DISCONTINUED | OUTPATIENT
Start: 2023-05-18 | End: 2023-05-18 | Stop reason: HOSPADM

## 2023-05-18 RX ADMIN — PROPOFOL 150 MG: 10 INJECTION, EMULSION INTRAVENOUS at 14:13

## 2023-05-18 RX ADMIN — SODIUM CHLORIDE: 9 INJECTION, SOLUTION INTRAVENOUS at 14:30

## 2023-05-18 RX ADMIN — PROPOFOL 50 MG: 10 INJECTION, EMULSION INTRAVENOUS at 14:31

## 2023-05-18 RX ADMIN — PROPOFOL 50 MG: 10 INJECTION, EMULSION INTRAVENOUS at 14:24

## 2023-05-18 RX ADMIN — PROPOFOL 50 MG: 10 INJECTION, EMULSION INTRAVENOUS at 14:40

## 2023-05-18 RX ADMIN — SODIUM CHLORIDE: 9 INJECTION, SOLUTION INTRAVENOUS at 13:55

## 2023-05-18 RX ADMIN — LIDOCAINE HYDROCHLORIDE 100 MG: 20 INJECTION, SOLUTION EPIDURAL; INFILTRATION; INTRACAUDAL; PERINEURAL at 14:13

## 2023-05-18 ASSESSMENT — PAIN - FUNCTIONAL ASSESSMENT: PAIN_FUNCTIONAL_ASSESSMENT: WONG-BAKER FACES

## 2023-05-18 NOTE — ANESTHESIA PRE PROCEDURE
Automatic Reconciliation   montelukast (SINGULAIR) 10 MG tablet Take 10 mg by mouth    Ar Automatic Reconciliation   OLANZapine (ZYPREXA) 5 MG tablet Take 5 mg by mouth    Ar Automatic Reconciliation   omeprazole (PRILOSEC) 20 MG delayed release capsule TAKE 1 CAPSULE BY MOUTH DAILY AS NEEDED 8/15/22   Ar Automatic Reconciliation   SODIUM FLUORIDE, DENTAL GEL, (DENTA 5000 PLUS) 1.1 % CREA BRUSH TEETH TWICE A DAY DO NOT EAT OR DRINK FOR 30 MINUTES AFTER USING 9/28/22   Ar Automatic Reconciliation   acetaminophen (TYLENOL) 500 MG tablet TAKE 1 TABLET EVERY 6 HOURS AS NEEDED FOR HEADACHE OR FEVER (NOT TO EXCEED 6 TABS/DAY) 3/11/20   Ar Automatic Reconciliation       Current medications:    Current Facility-Administered Medications   Medication Dose Route Frequency Provider Last Rate Last Admin    0.9 % sodium chloride infusion   IntraVENous Continuous Joselyn Aguilar MD        sodium chloride flush 0.9 % injection 5-40 mL  5-40 mL IntraVENous 2 times per day Joselyn Aguilar MD        sodium chloride flush 0.9 % injection 5-40 mL  5-40 mL IntraVENous PRN Joselyn Aguilar MD        0.9 % sodium chloride infusion  25 mL IntraVENous PRN Joselyn Aguilar MD           Allergies:  No Known Allergies    Problem List:    Patient Active Problem List   Diagnosis Code    Benign prostatic hyperplasia (BPH) with urinary urge incontinence N40.1, N39.41    Hyperlipidemia E78.5    Mild intermittent asthma without complication T56.49    Developmental non-verbal disorder F81.89    Moderate intellectual disability F71    Unsteady gait R26.81       Past Medical History:        Diagnosis Date    Asthma     PT ON DAILY INHALERS AND HAS A RESCUE INHALER    Ill-defined condition     MILD MR, AUTISM    Other ill-defined conditions(799.89)     elevated cholesterol    Other ill-defined conditions(799.89)     nonverbal,uses sign language some,mental retardation    Psychiatric disorder     MR,depression anxiety       Past Surgical

## 2023-05-18 NOTE — OP NOTE
[facility] Luz Elena Lamas M.D.  [address] [place] [address]  [phone]               Colonoscopy Procedure Note    NAME: Tej Sotomayor  :  1955  MRN:  [id number]    Indications:   Weight loss      : Benedicto Pang MD    Referring Provider:  Hiram Isaac MD    Staff: Circulator: Maged Benitez RN  Endoscopy Technician: Jessica Purvis    Prosthetic devices, grafts, tissues, transplant, or devices implanted: none    Medicines:  MAC anesthesia      Procedure Details:  After informed consent was obtained with all risks and benefits of the procedure explained and preprocedure exam completed, the patient was placed in the left lateral decubitus position. Universal protocol for patient identification was performed and documented in the nursing notes. Throughout the procedure, the patient's blood pressure was monitored at least every five minutes; pulse, and oxygen saturations were monitored continuously. All vital signs were documented in the nursing notes. A digital rectal exam was performed and was normal.  The Olympus videocolonoscope  was inserted in the rectum and carefully advanced to the terminal ileum. The colonoscope was slowly withdrawn with careful evaluation between folds. Retroflexion in the rectum was performed; findings and interventions are described below. Procedure start time, extent reached time/cecum time, and procedure end time are documented in the nursing notes. The quality of preparation was adequate.        Findings:   Normal TI  6 sessile polyps with greatest diameter of 6 mm (5 in the cecum, 1 in the transverse colon) s/p cold snare polypectomy  Redundant colon  Moderate internal hemorrhoids    Interventions:    6 complete polypectomy were performed using cold snare  and the polyps were  retrieved    Specimens:   ID Type Source Tests Collected by Time Destination   A : r/o celiac Tissue Duodenum SURGICAL PATHOLOGY Marvelyn Simmonds, MD
5/18/2023 1454         EBL: None          Complications:     No immediate complications        Impression:  -See post-procedure diagnoses. Recommendations:  -Await pathology. -PPI daily    Signed by:  Duana Mcburney, MD         5/18/2023 3:06 PM

## 2023-05-18 NOTE — DISCHARGE INSTRUCTIONS
[facility] [address] Owen Conley M.D.  May Rothman, [facility]  [phone]                      EGD/COLONOSCOPY [address]  [id number]  1955    DISCOMFORT:  Redness at IV site- apply warm compress to area; if redness or soreness persist- contact your physician  There may be a slight amount of blood passed from the rectum  Gaseous discomfort- walking, belching will help relieve any discomfort  You may not operate a vehicle for 12 hours  You may not  engage in an occupation involving machinery or appliances for rest of today  You may not  drink alcoholic beverages for at least 12 hours  Avoid making any critical decisions for at least 24 hour    DIET:   You may resume your normal diet, but some patients find that heavy or large  meals may lead to indigestion or vomiting. I suggest a light meal as first food  Intake. I recommend a whole food, plant-based diet for your overall health. ACTIVITY:  You may resume your normal daily activities. It is recommended that you spend the remainder of the day resting - avoid any strenuous activity. CALL M.D. ANY SIGN OF   Increasing pain, nausea, vomiting  Abdominal distension (swelling)  New increased bleeding (oral or rectal)  Fever (chills)  Pain in chest area  Bloody discharge from nose or mouth  Shortness of breath    Additional Instructions:   Call Dr. Owen Conley if any questions or problems at [phone]   You should receive the biopsy results by phone or mail within 3 weeks, if not, call my office for the results. Should have a repeat colonoscopy in 3-7 years based on pathology. EGD showed mild stomach redness and mild redness in the small intestine. Take omeprazole 20 mg daily. Colonoscopy showed 6 polyps removed and internal hemorrhoids.

## 2023-05-18 NOTE — PERIOP NOTE
Initial RN admission and assessment performed and documented in Endoscopy navigator. Patient evaluated by anesthesia in pre-procedure holding. All procedural vital signs, airway assessment, and level of consciousness information monitored and recorded by anesthesia staff on the anesthesia record. Specimens labeled and reviewed with physician. Report received from CRNA post procedure. Patient transported to recovery area by RN. Endoscope was pre-cleaned at bedside immediately following procedure by Biju.

## 2023-05-18 NOTE — ANESTHESIA POSTPROCEDURE EVALUATION
Department of Anesthesiology  Postprocedure Note    Patient: Jenny Howard  MRN: [id number]  YOB: 1955  Date of evaluation: 5/18/2023      Procedure Summary     Date: 05/18/23 Room / Location: [address] [place] [address] ENDO 06 / [address] [place] [address] ENDOSCOPY    Anesthesia Start: 1410 Anesthesia Stop: 1504    Procedures:       COLONOSCOPY, EGD      EGD ESOPHAGOGASTRODUODENOSCOPY      EGD BIOPSY      COLONOSCOPY POLYPECTOMY SNARE/COLD BIOPSY Diagnosis:       Abnormal weight loss      (Abnormal weight loss [R63.4])    Surgeons: Joanna Bae MD Responsible Provider: Scott Puckett MD    Anesthesia Type: MAC ASA Status: 2          Anesthesia Type: No value filed.     Eileen Phase I: Eileen Score: 10    Eileen Phase II: Eileen Score: 10      Anesthesia Post Evaluation    Patient location during evaluation: PACU  Level of consciousness: awake  Airway patency: patent  Nausea & Vomiting: no nausea  Complications: no  Cardiovascular status: blood pressure returned to baseline  Respiratory status: acceptable  Hydration status: euvolemic  Comments: --------------------            05/18/23               1535     --------------------   BP:     (!) 155/80   Pulse:               Resp:       17       Temp:                SpO2:      100%     --------------------

## 2023-05-18 NOTE — H&P
[address]  [facility], [address] [place] [address]          Pre-procedure History and Physical       NAME:  Magdalena Rodriguez   :   1955   MRN:   [id number]     CHIEF COMPLAINT/HPI: weight loss    PMH:  Past Medical History:   Diagnosis Date    Asthma     PT ON DAILY INHALERS AND HAS A RESCUE INHALER    Ill-defined condition     MILD MR, AUTISM    Other ill-defined conditions(799.89)     elevated cholesterol    Other ill-defined conditions(799.89)     nonverbal,uses sign language some,mental retardation    Psychiatric disorder     MR,depression anxiety       PSH:  Past Surgical History:   Procedure Laterality Date    COLONOSCOPY,BIOPSY  2014         EGD TRANSORAL BIOPSY SINGLE/MULTIPLE  2013            Allergies:  No Known Allergies    Home Medications:  Prior to Admission Medications   Prescriptions Last Dose Informant Patient Reported? Taking?    FLUoxetine (PROZAC) 20 MG capsule 2023  Yes No   Sig: Take 1 capsule by mouth daily   OLANZapine (ZYPREXA) 5 MG tablet 2023  Yes No   Sig: Take 1 tablet by mouth   PEG 3350-KCl-NaBcb-NaCl-NaSulf (PEG-3350/ELECTROLYTES) 236 g SOLR 2023  Yes No   SODIUM FLUORIDE, DENTAL GEL, 1.1 % CREA 2023  Yes No   Sig: BRUSH TEETH TWICE A DAY DO NOT EAT OR DRINK FOR 30 MINUTES AFTER USING   acetaminophen (TYLENOL) 500 MG tablet Unknown  Yes No   Sig: TAKE 1 TABLET EVERY 6 HOURS AS NEEDED FOR HEADACHE OR FEVER (NOT TO EXCEED 6 TABS/DAY)   albuterol sulfate HFA (PROVENTIL;VENTOLIN;PROAIR) 108 (90 Base) MCG/ACT inhaler Unknown  Yes No   Sig: Inhale 2 puffs into the lungs every 4 hours as needed   atorvastatin (LIPITOR) 10 MG tablet 2023  Yes No   Sig: TAKE 1 TABLET BY MOUTH ONCE DAILY IN THE EVENING   azelastine (OPTIVAR) 0.05 % ophthalmic solution 2023  Yes No   Sig: Apply 1 drop to eye 2 times daily   buPROPion (WELLBUTRIN XL) 300 MG extended release tablet 2023  Yes No   Sig: Take 1 tablet by mouth   cetirizine

## 2023-05-22 ENCOUNTER — OFFICE VISIT (OUTPATIENT)
Dept: PRIMARY CARE CLINIC | Facility: CLINIC | Age: 68
End: 2023-05-22
Payer: MEDICARE

## 2023-05-22 VITALS
TEMPERATURE: 97.8 F | OXYGEN SATURATION: 100 % | BODY MASS INDEX: 17.59 KG/M2 | WEIGHT: 105.6 LBS | HEART RATE: 60 BPM | SYSTOLIC BLOOD PRESSURE: 106 MMHG | HEIGHT: 65 IN | RESPIRATION RATE: 18 BRPM | DIASTOLIC BLOOD PRESSURE: 73 MMHG

## 2023-05-22 DIAGNOSIS — R05.9 COUGH, UNSPECIFIED TYPE: ICD-10-CM

## 2023-05-22 DIAGNOSIS — R63.4 ABNORMAL WEIGHT LOSS: Primary | ICD-10-CM

## 2023-05-22 PROCEDURE — G8419 CALC BMI OUT NRM PARAM NOF/U: HCPCS | Performed by: INTERNAL MEDICINE

## 2023-05-22 PROCEDURE — 1123F ACP DISCUSS/DSCN MKR DOCD: CPT | Performed by: INTERNAL MEDICINE

## 2023-05-22 PROCEDURE — 99213 OFFICE O/P EST LOW 20 MIN: CPT | Performed by: INTERNAL MEDICINE

## 2023-05-22 PROCEDURE — 3017F COLORECTAL CA SCREEN DOC REV: CPT | Performed by: INTERNAL MEDICINE

## 2023-05-22 PROCEDURE — 1036F TOBACCO NON-USER: CPT | Performed by: INTERNAL MEDICINE

## 2023-05-22 PROCEDURE — G8427 DOCREV CUR MEDS BY ELIG CLIN: HCPCS | Performed by: INTERNAL MEDICINE

## 2023-05-22 RX ORDER — LACTOSE-REDUCED FOOD 0.06G-1/ML
LIQUID (ML) ORAL
Qty: 120 EACH | Refills: 4 | Status: SHIPPED | OUTPATIENT
Start: 2023-05-22

## 2023-05-22 SDOH — ECONOMIC STABILITY: INCOME INSECURITY: HOW HARD IS IT FOR YOU TO PAY FOR THE VERY BASICS LIKE FOOD, HOUSING, MEDICAL CARE, AND HEATING?: PATIENT DECLINED

## 2023-05-22 SDOH — ECONOMIC STABILITY: FOOD INSECURITY: WITHIN THE PAST 12 MONTHS, THE FOOD YOU BOUGHT JUST DIDN'T LAST AND YOU DIDN'T HAVE MONEY TO GET MORE.: PATIENT DECLINED

## 2023-05-22 SDOH — ECONOMIC STABILITY: HOUSING INSECURITY
IN THE LAST 12 MONTHS, WAS THERE A TIME WHEN YOU DID NOT HAVE A STEADY PLACE TO SLEEP OR SLEPT IN A SHELTER (INCLUDING NOW)?: PATIENT REFUSED

## 2023-05-22 SDOH — ECONOMIC STABILITY: FOOD INSECURITY: WITHIN THE PAST 12 MONTHS, YOU WORRIED THAT YOUR FOOD WOULD RUN OUT BEFORE YOU GOT MONEY TO BUY MORE.: PATIENT DECLINED

## 2023-05-22 ASSESSMENT — PATIENT HEALTH QUESTIONNAIRE - PHQ9
SUM OF ALL RESPONSES TO PHQ QUESTIONS 1-9: 0
1. LITTLE INTEREST OR PLEASURE IN DOING THINGS: 0
SUM OF ALL RESPONSES TO PHQ QUESTIONS 1-9: 0
2. FEELING DOWN, DEPRESSED OR HOPELESS: 0
SUM OF ALL RESPONSES TO PHQ QUESTIONS 1-9: 0
SUM OF ALL RESPONSES TO PHQ QUESTIONS 1-9: 0
SUM OF ALL RESPONSES TO PHQ9 QUESTIONS 1 & 2: 0

## 2023-05-22 NOTE — PROGRESS NOTES
Chief Complaint   Patient presents with    Follow-up     Colonoscopy and endoscopy last Thursday       There were no vitals taken for this visit. 1. Have you been to the ER, urgent care clinic since your last visit? Hospitalized since your last visit? [facility]    2. Have you seen or consulted any other health care providers outside of the [address] [place] [address] since your last visit? Include any pap smears or colon screening. [facility]      3. For patients aged 39-70: Has the patient had a colonoscopy / FIT/ Cologuard?  yes
solution Apply 1 drop to eye 2 times daily    buPROPion (WELLBUTRIN XL) 300 MG extended release tablet Take 1 tablet by mouth    cetirizine (ZYRTEC) 10 MG tablet Take 1 tablet by mouth daily    FLUoxetine (PROZAC) 20 MG capsule Take 1 capsule by mouth daily    fluticasone (FLONASE) 50 MCG/ACT nasal spray 2 sprays by Nasal route daily    fluticasone (FLOVENT HFA) 110 MCG/ACT inhaler Inhale 2 puffs into the lungs in the morning and 2 puffs in the evening. hydrocortisone 2.5 % ointment APPLY TO AFFECTED AREA ON FACE NIGHTLY AS NEEDED FOR SEBORRHEIC DERMATITIS FLARE UPS    loperamide (IMODIUM A-D) 2 MG tablet Take 1 tablet by mouth 4 times daily as needed    meclizine (ANTIVERT) 25 MG tablet Take by mouth 3 times daily as needed    montelukast (SINGULAIR) 10 MG tablet Take 1 tablet by mouth    OLANZapine (ZYPREXA) 5 MG tablet Take 1 tablet by mouth    omeprazole (PRILOSEC) 20 MG delayed release capsule TAKE 1 CAPSULE BY MOUTH DAILY AS NEEDED    SODIUM FLUORIDE, DENTAL GEL, 1.1 % CREA BRUSH TEETH TWICE A DAY DO NOT EAT OR DRINK FOR 30 MINUTES AFTER USING    acetaminophen (TYLENOL) 500 MG tablet TAKE 1 TABLET EVERY 6 HOURS AS NEEDED FOR HEADACHE OR FEVER (NOT TO EXCEED 6 TABS/DAY)     No current facility-administered medications for this visit.      Health Maintenance   Topic Date Due    COVID-19 Vaccine (3 - Booster for [employer] series) 04/06/2021    Annual Wellness Visit (AWV)  07/31/2022    Lipids  02/01/2023    Flu vaccine (Season Ended) 08/01/2023    Depression Screen  05/22/2024    DTaP/Tdap/Td vaccine (2 - Td or Tdap) 11/26/2028    Colorectal Cancer Screen  05/18/2033    Shingles vaccine  Completed    Pneumococcal 65+ years Vaccine  Completed    Hepatitis C screen  Completed    Hepatitis A vaccine  Aged Out    Hib vaccine  Aged Out    Meningococcal (ACWY) vaccine  Aged Out    Prostate Specific Antigen (PSA) Screening or Monitoring  Discontinued     Immunization History   Administered Date(s) Administered

## 2023-06-07 ENCOUNTER — TELEPHONE (OUTPATIENT)
Dept: PRIMARY CARE CLINIC | Facility: CLINIC | Age: 68
End: 2023-06-07

## 2023-06-07 DIAGNOSIS — K59.09 OTHER CONSTIPATION: Primary | ICD-10-CM

## 2023-06-07 RX ORDER — POLYETHYLENE GLYCOL 3350 17 G/17G
17 POWDER, FOR SOLUTION ORAL DAILY
Qty: 1530 G | Refills: 3 | Status: SHIPPED | OUTPATIENT
Start: 2023-06-07 | End: 2024-06-01

## 2023-06-07 NOTE — TELEPHONE ENCOUNTER
Patient's caretaker Juan Rosas called, said that the lactose drink we prescribed is giving the patient diarrhea every morning. Juan Rosas was not able to provide the name of the script but said it was a laxative to be taken at night. The patient may need a different dose, or a different script altogether.

## 2023-06-13 DIAGNOSIS — E78.5 HYPERLIPIDEMIA, UNSPECIFIED HYPERLIPIDEMIA TYPE: ICD-10-CM

## 2023-06-13 DIAGNOSIS — N40.1 BENIGN PROSTATIC HYPERPLASIA WITH LOWER URINARY TRACT SYMPTOMS, SYMPTOM DETAILS UNSPECIFIED: ICD-10-CM

## 2023-06-13 RX ORDER — ATORVASTATIN CALCIUM 10 MG/1
TABLET, FILM COATED ORAL
Qty: 30 TABLET | Refills: 0 | Status: SHIPPED | OUTPATIENT
Start: 2023-06-13 | End: 2023-07-18

## 2023-06-13 RX ORDER — FINASTERIDE 5 MG/1
TABLET, FILM COATED ORAL
Qty: 30 TABLET | Refills: 0 | Status: SHIPPED | OUTPATIENT
Start: 2023-06-13 | End: 2023-07-18

## 2023-06-27 ENCOUNTER — OFFICE VISIT (OUTPATIENT)
Dept: PRIMARY CARE CLINIC | Facility: CLINIC | Age: 68
End: 2023-06-27
Payer: MEDICARE

## 2023-06-27 ENCOUNTER — TELEPHONE (OUTPATIENT)
Dept: PRIMARY CARE CLINIC | Facility: CLINIC | Age: 68
End: 2023-06-27

## 2023-06-27 VITALS
OXYGEN SATURATION: 100 % | TEMPERATURE: 97.8 F | HEART RATE: 54 BPM | HEIGHT: 65 IN | BODY MASS INDEX: 17.16 KG/M2 | RESPIRATION RATE: 18 BRPM | DIASTOLIC BLOOD PRESSURE: 72 MMHG | SYSTOLIC BLOOD PRESSURE: 140 MMHG | WEIGHT: 103 LBS

## 2023-06-27 DIAGNOSIS — Z22.7 LATENT TUBERCULOSIS BY BLOOD TEST: ICD-10-CM

## 2023-06-27 DIAGNOSIS — Z00.00 MEDICARE ANNUAL WELLNESS VISIT, SUBSEQUENT: Primary | ICD-10-CM

## 2023-06-27 DIAGNOSIS — Z12.5 PROSTATE CANCER SCREENING: ICD-10-CM

## 2023-06-27 DIAGNOSIS — E55.9 VITAMIN D DEFICIENCY: ICD-10-CM

## 2023-06-27 DIAGNOSIS — R63.4 ABNORMAL WEIGHT LOSS: ICD-10-CM

## 2023-06-27 DIAGNOSIS — E78.00 PURE HYPERCHOLESTEROLEMIA: ICD-10-CM

## 2023-06-27 LAB
ALBUMIN SERPL-MCNC: 3.3 G/DL (ref 3.5–5)
ALBUMIN/GLOB SERPL: 1 (ref 1.1–2.2)
ALP SERPL-CCNC: 105 U/L (ref 45–117)
ALT SERPL-CCNC: 85 U/L (ref 12–78)
ANION GAP SERPL CALC-SCNC: 2 MMOL/L (ref 5–15)
AST SERPL-CCNC: 52 U/L (ref 15–37)
BILIRUB SERPL-MCNC: 0.3 MG/DL (ref 0.2–1)
BUN SERPL-MCNC: 21 MG/DL (ref 6–20)
BUN/CREAT SERPL: 22 (ref 12–20)
CALCIUM SERPL-MCNC: 8.9 MG/DL (ref 8.5–10.1)
CHLORIDE SERPL-SCNC: 111 MMOL/L (ref 97–108)
CHOLEST SERPL-MCNC: 148 MG/DL
CO2 SERPL-SCNC: 33 MMOL/L (ref 21–32)
CREAT SERPL-MCNC: 0.95 MG/DL (ref 0.7–1.3)
ERYTHROCYTE [DISTWIDTH] IN BLOOD BY AUTOMATED COUNT: 13.2 % (ref 11.5–14.5)
GLOBULIN SER CALC-MCNC: 3.3 G/DL (ref 2–4)
GLUCOSE SERPL-MCNC: 86 MG/DL (ref 65–100)
HCT VFR BLD AUTO: 34.5 % (ref 36.6–50.3)
HDLC SERPL-MCNC: 81 MG/DL
HDLC SERPL: 1.8 (ref 0–5)
HGB BLD-MCNC: 11.1 G/DL (ref 12.1–17)
LDLC SERPL CALC-MCNC: 59.6 MG/DL (ref 0–100)
MCH RBC QN AUTO: 32.5 PG (ref 26–34)
MCHC RBC AUTO-ENTMCNC: 32.2 G/DL (ref 30–36.5)
MCV RBC AUTO: 100.9 FL (ref 80–99)
NRBC # BLD: 0 K/UL (ref 0–0.01)
NRBC BLD-RTO: 0 PER 100 WBC
PLATELET # BLD AUTO: 203 K/UL (ref 150–400)
PMV BLD AUTO: 11 FL (ref 8.9–12.9)
POTASSIUM SERPL-SCNC: 4.5 MMOL/L (ref 3.5–5.1)
PROT SERPL-MCNC: 6.6 G/DL (ref 6.4–8.2)
PSA SERPL-MCNC: 0.1 NG/ML (ref 0.01–4)
RBC # BLD AUTO: 3.42 M/UL (ref 4.1–5.7)
SODIUM SERPL-SCNC: 146 MMOL/L (ref 136–145)
TRIGL SERPL-MCNC: 37 MG/DL
VLDLC SERPL CALC-MCNC: 7.4 MG/DL
WBC # BLD AUTO: 5.4 K/UL (ref 4.1–11.1)

## 2023-06-27 PROCEDURE — 3017F COLORECTAL CA SCREEN DOC REV: CPT | Performed by: INTERNAL MEDICINE

## 2023-06-27 PROCEDURE — 1123F ACP DISCUSS/DSCN MKR DOCD: CPT | Performed by: INTERNAL MEDICINE

## 2023-06-27 PROCEDURE — G0439 PPPS, SUBSEQ VISIT: HCPCS | Performed by: INTERNAL MEDICINE

## 2023-06-27 ASSESSMENT — PATIENT HEALTH QUESTIONNAIRE - PHQ9
2. FEELING DOWN, DEPRESSED OR HOPELESS: 0
SUM OF ALL RESPONSES TO PHQ9 QUESTIONS 1 & 2: 0
1. LITTLE INTEREST OR PLEASURE IN DOING THINGS: 0
SUM OF ALL RESPONSES TO PHQ QUESTIONS 1-9: 0

## 2023-06-27 ASSESSMENT — LIFESTYLE VARIABLES
HOW MANY STANDARD DRINKS CONTAINING ALCOHOL DO YOU HAVE ON A TYPICAL DAY: PATIENT DOES NOT DRINK
HOW OFTEN DO YOU HAVE A DRINK CONTAINING ALCOHOL: NEVER

## 2023-06-28 LAB — 25(OH)D3 SERPL-MCNC: 41 NG/ML (ref 30–100)

## 2023-07-02 LAB
GAMMA INTERFERON BACKGROUND BLD IA-ACNC: 0.07 IU/ML
M TB IFN-G BLD-IMP: NEGATIVE
M TB IFN-G CD4+ T-CELLS BLD-ACNC: 0.06 IU/ML
M TBIFN-G CD4+ CD8+T-CELLS BLD-ACNC: 0.08 IU/ML
MITOGEN IGNF BLD-ACNC: >10 IU/ML
SERVICE CMNT-IMP: NORMAL

## 2023-07-14 DIAGNOSIS — E78.5 HYPERLIPIDEMIA, UNSPECIFIED HYPERLIPIDEMIA TYPE: ICD-10-CM

## 2023-07-14 DIAGNOSIS — N40.1 BENIGN PROSTATIC HYPERPLASIA WITH LOWER URINARY TRACT SYMPTOMS, SYMPTOM DETAILS UNSPECIFIED: ICD-10-CM

## 2023-07-18 RX ORDER — ATORVASTATIN CALCIUM 10 MG/1
TABLET, FILM COATED ORAL
Qty: 90 TABLET | Refills: 1 | Status: SHIPPED | OUTPATIENT
Start: 2023-07-18

## 2023-07-18 RX ORDER — FINASTERIDE 5 MG/1
TABLET, FILM COATED ORAL
Qty: 90 TABLET | Refills: 1 | Status: SHIPPED | OUTPATIENT
Start: 2023-07-18

## 2023-08-24 ENCOUNTER — OFFICE VISIT (OUTPATIENT)
Dept: PRIMARY CARE CLINIC | Facility: CLINIC | Age: 68
End: 2023-08-24

## 2023-08-24 VITALS
TEMPERATURE: 98.5 F | HEIGHT: 65 IN | BODY MASS INDEX: 18.53 KG/M2 | HEART RATE: 67 BPM | DIASTOLIC BLOOD PRESSURE: 68 MMHG | OXYGEN SATURATION: 98 % | SYSTOLIC BLOOD PRESSURE: 106 MMHG | RESPIRATION RATE: 18 BRPM | WEIGHT: 111.2 LBS

## 2023-08-24 DIAGNOSIS — R63.4 ABNORMAL WEIGHT LOSS: ICD-10-CM
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
## 2023-08-24 DIAGNOSIS — R56.9 SEIZURES (HCC): Primary | ICD-10-CM

## 2023-08-24 RX ORDER — LEVETIRACETAM 500 MG/1
TABLET ORAL
COMMUNITY
Start: 2023-08-04

## 2023-09-14 RX ORDER — MULTIVITAMIN WITH FOLIC ACID 400 MCG
1 TABLET ORAL DAILY
Qty: 30 TABLET | Refills: 12 | Status: SHIPPED | OUTPATIENT
Start: 2023-09-14

## 2023-09-19 RX ORDER — LANOLIN ALCOHOL/MO/W.PET/CERES
CREAM (GRAM) TOPICAL
Qty: 30 TABLET | OUTPATIENT
Start: 2023-09-19

## 2023-09-19 RX ORDER — LANOLIN ALCOHOL/MO/W.PET/CERES
CREAM (GRAM) TOPICAL
COMMUNITY
Start: 2023-08-04 | End: 2023-09-21 | Stop reason: SDUPTHER

## 2023-09-20 ENCOUNTER — TELEPHONE (OUTPATIENT)
Dept: PRIMARY CARE CLINIC | Facility: CLINIC | Age: 68
End: 2023-09-20

## 2023-09-20 NOTE — TELEPHONE ENCOUNTER
Magnesium oxide 400 mg    [facility]    Last request was denied because it was \"responded to by other means,\" but I do not see any documentation.

## 2023-09-21 RX ORDER — LANOLIN ALCOHOL/MO/W.PET/CERES
400 CREAM (GRAM) TOPICAL DAILY
Qty: 90 TABLET | Refills: 1 | Status: SHIPPED | OUTPATIENT
Start: 2023-09-21

## 2023-09-22 ENCOUNTER — TELEPHONE (OUTPATIENT)
Dept: PRIMARY CARE CLINIC | Facility: CLINIC | Age: 68
End: 2023-09-22

## 2023-09-22 NOTE — TELEPHONE ENCOUNTER
Norberto Marcus calling asking if his laxative can be PRN. She said he is having some issues with it.

## 2023-09-22 NOTE — TELEPHONE ENCOUNTER
Spoke to nurse Brenda Pacheco patient is in a group home and is having diarrhea every day. He needs a order to change Miralax from every day to PRN. Please fax order to [phone].

## 2023-09-25 ENCOUNTER — TELEPHONE (OUTPATIENT)
Dept: PRIMARY CARE CLINIC | Facility: CLINIC | Age: 68
End: 2023-09-25

## 2023-09-25 DIAGNOSIS — K59.09 OTHER CONSTIPATION: ICD-10-CM

## 2023-09-25 RX ORDER — POLYETHYLENE GLYCOL 3350 17 G/17G
17 POWDER, FOR SOLUTION ORAL PRN
Qty: 1530 G | Refills: 3 | Status: SHIPPED | OUTPATIENT
Start: 2023-09-25 | End: 2024-09-19

## 2023-09-25 NOTE — TELEPHONE ENCOUNTER
Spoke to Steve Mcrae at the group home and advised that Dr. Chavo Andersen sent a new prn order to pharmacy for Miralax to be given [phone]

## 2023-10-03 ENCOUNTER — TELEPHONE (OUTPATIENT)
Dept: PRIMARY CARE CLINIC | Facility: CLINIC | Age: 68
End: 2023-10-03

## 2023-10-03 NOTE — TELEPHONE ENCOUNTER
Cesar Chen called and said he has been gaining wait now and wants to know if Dr. Cassy Paulson still wants him to go to endocrinologist.

## 2023-10-03 NOTE — TELEPHONE ENCOUNTER
Called Chris Hernandez on behalf of patient.  I told her that they may cancel appointment with endo specialist due to patient gaining weight

## 2023-10-10 DIAGNOSIS — N40.1 BENIGN PROSTATIC HYPERPLASIA WITH LOWER URINARY TRACT SYMPTOMS, SYMPTOM DETAILS UNSPECIFIED: ICD-10-CM

## 2023-10-10 DIAGNOSIS — E78.5 HYPERLIPIDEMIA, UNSPECIFIED HYPERLIPIDEMIA TYPE: ICD-10-CM

## 2023-10-10 RX ORDER — ATORVASTATIN CALCIUM 10 MG/1
TABLET, FILM COATED ORAL
Qty: 90 TABLET | Refills: 1 | Status: SHIPPED | OUTPATIENT
Start: 2023-10-10

## 2023-10-10 RX ORDER — FINASTERIDE 5 MG/1
TABLET, FILM COATED ORAL
Qty: 90 TABLET | Refills: 1 | Status: SHIPPED | OUTPATIENT
Start: 2023-10-10

## 2023-10-18 ENCOUNTER — HOSPITAL ENCOUNTER (OUTPATIENT)
Facility: HOSPITAL | Age: 68
Discharge: HOME | End: 2023-10-21
Payer: MEDICARE

## 2023-10-18 ENCOUNTER — OFFICE VISIT (OUTPATIENT)
Dept: PRIMARY CARE CLINIC | Facility: CLINIC | Age: 68
End: 2023-10-18
Payer: MEDICARE

## 2023-10-18 VITALS
OXYGEN SATURATION: 99 % | HEART RATE: 65 BPM | DIASTOLIC BLOOD PRESSURE: 80 MMHG | RESPIRATION RATE: 16 BRPM | WEIGHT: 133.4 LBS | TEMPERATURE: 99.5 F | SYSTOLIC BLOOD PRESSURE: 124 MMHG | BODY MASS INDEX: 22.23 KG/M2 | HEIGHT: 65 IN

## 2023-10-18 DIAGNOSIS — S12.691A OTHER CLOSED NONDISPLACED FRACTURE OF SEVENTH CERVICAL VERTEBRA, INITIAL ENCOUNTER (HCC): ICD-10-CM

## 2023-10-18 DIAGNOSIS — R50.9 FEVER, UNSPECIFIED FEVER CAUSE: Primary | ICD-10-CM

## 2023-10-18 DIAGNOSIS — R50.9 FEVER, UNSPECIFIED FEVER CAUSE: ICD-10-CM

## 2023-10-18 LAB
INFLUENZA A ANTIGEN, POC: NEGATIVE
INFLUENZA B ANTIGEN, POC: NEGATIVE
LOT EXPIRE DATE: NORMAL
LOT KIT NUMBER: NORMAL
SARS-COV-2 RNA, POC: NEGATIVE
VALID INTERNAL CONTROL: YES
VENDOR AND KIT NAME POC: NORMAL

## 2023-10-18 PROCEDURE — G8428 CUR MEDS NOT DOCUMENT: HCPCS | Performed by: INTERNAL MEDICINE

## 2023-10-18 PROCEDURE — 99214 OFFICE O/P EST MOD 30 MIN: CPT | Performed by: INTERNAL MEDICINE

## 2023-10-18 PROCEDURE — 3017F COLORECTAL CA SCREEN DOC REV: CPT | Performed by: INTERNAL MEDICINE

## 2023-10-18 PROCEDURE — 87428 SARSCOV & INF VIR A&B AG IA: CPT | Performed by: INTERNAL MEDICINE

## 2023-10-18 PROCEDURE — 1123F ACP DISCUSS/DSCN MKR DOCD: CPT | Performed by: INTERNAL MEDICINE

## 2023-10-18 PROCEDURE — G8420 CALC BMI NORM PARAMETERS: HCPCS | Performed by: INTERNAL MEDICINE

## 2023-10-18 PROCEDURE — 1036F TOBACCO NON-USER: CPT | Performed by: INTERNAL MEDICINE

## 2023-10-18 PROCEDURE — 71046 X-RAY EXAM CHEST 2 VIEWS: CPT

## 2023-10-18 PROCEDURE — G8484 FLU IMMUNIZE NO ADMIN: HCPCS | Performed by: INTERNAL MEDICINE

## 2023-10-18 NOTE — PROGRESS NOTES
Joesph Tolbert is a 76 y.o.  male and presents with     Chief Complaint   Patient presents with    Follow-Up from [facility]     09/28/23 [facility] CT show old break in neck. He had a fall and hit head and face    Nasal Congestion     Pt  fell in group home coming  out of bathroom , slipped on the rug and fell. Was taken to urgent care and was asked to go to [facility]. Showed old fracture of the neck. Pt is doing well as per care giver. Has low grade temp. Pt is gaining wt.         Past Medical History:   Diagnosis Date    Asthma     PT ON DAILY INHALERS AND HAS A RESCUE INHALER    Ill-defined condition     MILD MR, AUTISM    Other ill-defined conditions(799.89)     elevated cholesterol    Other ill-defined conditions(799.89)     nonverbal,uses sign language some,mental retardation    Psychiatric disorder     MR,depression anxiety     Past Surgical History:   Procedure Laterality Date    COLONOSCOPY N/A 5/18/2023    COLONOSCOPY, EGD performed by Kb Shearer MD at [address] [facility] [facility] ENDOSCOPY    COLONOSCOPY N/A 5/18/2023    COLONOSCOPY POLYPECTOMY SNARE/COLD BIOPSY performed by Kb Shearer MD at [facility]  11/25/2014         EGD TRANSORAL BIOPSY SINGLE/MULTIPLE  8/1/2013         UPPER GASTROINTESTINAL ENDOSCOPY N/A 5/18/2023    EGD ESOPHAGOGASTRODUODENOSCOPY performed by Kb Shearer MD at [facility] 5/18/2023    EGD BIOPSY performed by Kb Shearer MD at [address] [facility] [facility] ENDOSCOPY     Current Outpatient Medications   Medication Sig    DIMETHICONE EX APPLY TO FACE DAILY    finasteride (PROSCAR) 5 MG tablet TAKE 1 TABLET DAILY    atorvastatin (LIPITOR) 10 MG tablet TAKE 1 TABLET BY MOUTH ONCE DAILY IN THE EVENING    polyethylene glycol (GLYCOLAX) 17 GM/SCOOP powder Take 17 g by mouth as needed (prn)    magnesium oxide (MAG-OX) 400 (240 Mg) MG tablet Take 1 tablet by mouth daily    Multiple Vitamin (HIGH POTENCY MULTIVITAMIN) TABS TAKE 1 TABLET BY MOUTH DAILY

## 2023-11-15 RX ORDER — LOPERAMIDE HYDROCHLORIDE 2 MG/1
TABLET, FILM COATED ORAL
Qty: 12 TABLET | Refills: 12 | Status: SHIPPED | OUTPATIENT
Start: 2023-11-15

## 2023-11-15 NOTE — TELEPHONE ENCOUNTER
Mother would like STD testing for pt coming this afternoon.    PCP: Fer Khoury MD    Last Visit 10/18/2023   Future Appointments   Date Time Provider [address]   4/18/2024 11:00 AM Chante Bailey MD [facility]       Requested Prescriptions     Pending Prescriptions Disp Refills    SM ANTI-DIARRHEAL 2 MG tablet [Pharmacy Med Name: SM ANTI-DIARRHEAL 2 MG CAPLET] 12 tablet 12     Sig: TAKE (1) TABLET BY MOUTH 4 TIMES A DAY AS NEEDED FOR DIARRHEA         Other Comments: Last Refill

## 2024-01-12 ENCOUNTER — TELEPHONE (OUTPATIENT)
Dept: PRIMARY CARE CLINIC | Facility: CLINIC | Age: 69
End: 2024-01-12

## 2024-01-12 NOTE — TELEPHONE ENCOUNTER
Elvi Dykes asked if a DC order can be sent in for laxative    Can BOOST be written for once daily at dinner time?

## 2024-01-12 NOTE — TELEPHONE ENCOUNTER
Gabrielle from the nursing home asked if Dr. Zamora can take the patient off of any laxitive because he has been having a runny stool and lower his Boost Intake because the patient is gaining weight.  [phone] cell  [phone] office

## 2024-01-19 ENCOUNTER — TELEPHONE (OUTPATIENT)
Dept: PRIMARY CARE CLINIC | Facility: CLINIC | Age: 69
End: 2024-01-19

## 2024-01-19 NOTE — TELEPHONE ENCOUNTER
This product is no longer made Neutrogena, Neutrogena Sensitive skin.   Please send another Lotion to [facility] or discontinue medication.

## 2024-01-19 NOTE — TELEPHONE ENCOUNTER
Fax letter  discontinue letter for the Boost and Laxative  to Gabrielle from patient group home fax number was [phone]

## 2024-01-22 DIAGNOSIS — L85.3 DRY SKIN: Primary | ICD-10-CM

## 2024-03-12 RX ORDER — LANOLIN ALCOHOL/MO/W.PET/CERES
400 CREAM (GRAM) TOPICAL DAILY
Qty: 90 TABLET | Refills: 0 | Status: SHIPPED | OUTPATIENT
Start: 2024-03-12

## 2024-03-12 NOTE — TELEPHONE ENCOUNTER
PCP: Hardik Bailey MD    Last Visit 10/18/2023   Future Appointments   Date Time Provider Department Center   4/18/2024 11:00 AM Hardik Bailey MD [facility] AMB   5/31/2024 11:40 AM Mustapha Estrada MD [facility] BS AMB       Requested Prescriptions     Pending Prescriptions Disp Refills    magnesium oxide (MAG-OX) 400 (240 Mg) MG tablet [Pharmacy Med Name: MAGNESIUM OXIDE 400 MG TABLET] 31 tablet 5     Sig: TAKE 1 TABLET BY MOUTH DAILY         Other Comments: Last Refill   09/21/23

## 2024-03-26 LAB
GAMMA INTERFERON BACKGROUND BLD IA-ACNC: 0.07 IU/ML
M TB IFN-G BLD-IMP: NEGATIVE
M TB IFN-G CD4+ BCKGRND COR BLD-ACNC: 0.06 IU/ML
M TB IFN-G CD4+CD8+ BCKGRND COR BLD-ACNC: 0.08 IU/ML
MITOGEN IGNF BCKGRD COR BLD-ACNC: >10 IU/ML
SERVICE CMNT-IMP: NORMAL

## 2024-04-15 DIAGNOSIS — E78.5 HYPERLIPIDEMIA, UNSPECIFIED HYPERLIPIDEMIA TYPE: ICD-10-CM

## 2024-04-15 RX ORDER — ATORVASTATIN CALCIUM 10 MG/1
TABLET, FILM COATED ORAL
Qty: 30 TABLET | Refills: 2 | Status: SHIPPED | OUTPATIENT
Start: 2024-04-15

## 2024-04-18 ENCOUNTER — TELEPHONE (OUTPATIENT)
Dept: PRIMARY CARE CLINIC | Facility: CLINIC | Age: 69
End: 2024-04-18

## 2024-04-18 NOTE — TELEPHONE ENCOUNTER
----- Message from Kusum Hwang sent at 4/18/2024  9:45 AM EDT -----  Subject: Message to Provider    QUESTIONS  Information for Provider? Gabrielle Dykes called form [facility] a   group home, she is requesting the patients PCP Hardik Bailey continue   to refill his medications until he is seen by his new PCP 5/31/2024.  ---------------------------------------------------------------------------  --------------  CALL BACK INFO  [phone]; OK to leave message on voicemail  ---------------------------------------------------------------------------  --------------  SCRIPT ANSWERS  Relationship to Patient? Covered Entity  Covered Entity Type? Assisted Living Facility?  Representative Name? Gabrielle Dykes

## 2024-05-31 ENCOUNTER — OFFICE VISIT (OUTPATIENT)
Age: 69
End: 2024-05-31
Payer: MEDICARE

## 2024-05-31 VITALS
TEMPERATURE: 96.9 F | HEART RATE: 66 BPM | OXYGEN SATURATION: 98 % | BODY MASS INDEX: 22.11 KG/M2 | RESPIRATION RATE: 16 BRPM | DIASTOLIC BLOOD PRESSURE: 71 MMHG | SYSTOLIC BLOOD PRESSURE: 114 MMHG | HEIGHT: 65 IN | WEIGHT: 132.72 LBS

## 2024-05-31 DIAGNOSIS — N40.1 BENIGN PROSTATIC HYPERPLASIA WITH LOWER URINARY TRACT SYMPTOMS, SYMPTOM DETAILS UNSPECIFIED: ICD-10-CM

## 2024-05-31 DIAGNOSIS — E78.5 HYPERLIPIDEMIA, UNSPECIFIED HYPERLIPIDEMIA TYPE: Primary | ICD-10-CM

## 2024-05-31 DIAGNOSIS — R56.9 SEIZURES (HCC): ICD-10-CM

## 2024-05-31 PROCEDURE — G8427 DOCREV CUR MEDS BY ELIG CLIN: HCPCS | Performed by: STUDENT IN AN ORGANIZED HEALTH CARE EDUCATION/TRAINING PROGRAM

## 2024-05-31 PROCEDURE — 99203 OFFICE O/P NEW LOW 30 MIN: CPT | Performed by: STUDENT IN AN ORGANIZED HEALTH CARE EDUCATION/TRAINING PROGRAM

## 2024-05-31 PROCEDURE — 1123F ACP DISCUSS/DSCN MKR DOCD: CPT | Performed by: STUDENT IN AN ORGANIZED HEALTH CARE EDUCATION/TRAINING PROGRAM

## 2024-05-31 PROCEDURE — 1036F TOBACCO NON-USER: CPT | Performed by: STUDENT IN AN ORGANIZED HEALTH CARE EDUCATION/TRAINING PROGRAM

## 2024-05-31 PROCEDURE — 3017F COLORECTAL CA SCREEN DOC REV: CPT | Performed by: STUDENT IN AN ORGANIZED HEALTH CARE EDUCATION/TRAINING PROGRAM

## 2024-05-31 PROCEDURE — G8420 CALC BMI NORM PARAMETERS: HCPCS | Performed by: STUDENT IN AN ORGANIZED HEALTH CARE EDUCATION/TRAINING PROGRAM

## 2024-05-31 RX ORDER — FINASTERIDE 5 MG/1
5 TABLET, FILM COATED ORAL DAILY
Qty: 30 TABLET | Refills: 11 | Status: SHIPPED | OUTPATIENT
Start: 2024-05-31

## 2024-05-31 RX ORDER — ATORVASTATIN CALCIUM 10 MG/1
10 TABLET, FILM COATED ORAL NIGHTLY
Qty: 30 TABLET | Refills: 11 | Status: SHIPPED | OUTPATIENT
Start: 2024-05-31

## 2024-05-31 SDOH — ECONOMIC STABILITY: FOOD INSECURITY: WITHIN THE PAST 12 MONTHS, YOU WORRIED THAT YOUR FOOD WOULD RUN OUT BEFORE YOU GOT MONEY TO BUY MORE.: NEVER TRUE

## 2024-05-31 SDOH — ECONOMIC STABILITY: FOOD INSECURITY: WITHIN THE PAST 12 MONTHS, THE FOOD YOU BOUGHT JUST DIDN'T LAST AND YOU DIDN'T HAVE MONEY TO GET MORE.: NEVER TRUE

## 2024-05-31 SDOH — ECONOMIC STABILITY: HOUSING INSECURITY
IN THE LAST 12 MONTHS, WAS THERE A TIME WHEN YOU DID NOT HAVE A STEADY PLACE TO SLEEP OR SLEPT IN A SHELTER (INCLUDING NOW)?: NO

## 2024-05-31 SDOH — ECONOMIC STABILITY: INCOME INSECURITY: HOW HARD IS IT FOR YOU TO PAY FOR THE VERY BASICS LIKE FOOD, HOUSING, MEDICAL CARE, AND HEATING?: NOT HARD AT ALL

## 2024-05-31 ASSESSMENT — PATIENT HEALTH QUESTIONNAIRE - PHQ9
SUM OF ALL RESPONSES TO PHQ QUESTIONS 1-9: 0
1. LITTLE INTEREST OR PLEASURE IN DOING THINGS: NOT AT ALL
SUM OF ALL RESPONSES TO PHQ QUESTIONS 1-9: 0
DEPRESSION UNABLE TO ASSESS: FUNCTIONAL CAPACITY MOTIVATION LIMITS ACCURACY
SUM OF ALL RESPONSES TO PHQ QUESTIONS 1-9: 0
SUM OF ALL RESPONSES TO PHQ QUESTIONS 1-9: 0
SUM OF ALL RESPONSES TO PHQ9 QUESTIONS 1 & 2: 0
2. FEELING DOWN, DEPRESSED OR HOPELESS: NOT AT ALL

## 2024-05-31 NOTE — PROGRESS NOTES
Critical access hospital  [address] [facility] [place] [address] [phone]    C/C: Acute/chronic medical problems and establish care     HPI:    Jules Bowie is a 69 y.o.  White (non-)  male who presents to clinic today for evaluation of the issues listed below. Pt is new to the practice.    Previous pcp: [facility]/ Hardik Bailey MD      Accompanied By: Gabrielle ( Care Taker)    Subjective;    Patient presenting for initial office visit with me today.    Significant PMHx include: Seborrheic dermatitis, Cryptorchism, Autism Spectrum Disorder, positional vertigo, Bilateral Maringotomy. Here to establish care and follow up on acute/chronic conditions;    Pt lives in group home ([facility]) and nonverbal at baseline.    HLD   Pt is doing well on current meds with no medication side effects noted   No new myalgias, no joint pains, no weakness   No TIA's, no chest pain on exertion, no dyspnea on exertion, no swelling of ankles.   Exercising -walking, staying active.    Pt denies any  fever, chill, chest pain, SOB, abdominal pain, n/v/d, HA or dizziness.       Other Specialists/providers:  Psychiatrist - Maik Perez  Neurology - Shar  Podiatrist  GI  ENT - chronic vertigo and hard of hearing.     Allergies- reviewed:   No Known Allergies    Past Medical History- reviewed:  Past Medical History:   Diagnosis Date    Asthma     PT ON DAILY INHALERS AND HAS A RESCUE INHALER    Ill-defined condition     MILD MR, AUTISM    Other ill-defined conditions(799.89)     elevated cholesterol    Other ill-defined conditions(799.89)     nonverbal,uses sign language some,mental retardation    Psychiatric disorder     MR,depression anxiety       Family History - reviewed:  No family history on file.    Social History - reviewed:  Social History     Socioeconomic History    Marital status: Unknown     Spouse name: Not on file    Number of children: Not on file

## 2024-05-31 NOTE — PROGRESS NOTES
Previous PCP: [facility]/ Hardik Bailey MD     Accompanied By: Gabrielle ( Care Taker)    Chief Complaint   Patient presents with    New Patient    Establish Care    Health Status Form Completion     The patient, Jules Bowie, identity was verified by name and , pharmacy verified  Labs:Yes  Fasting:No    \"Have you been to the ER, urgent care clinic since your last visit?  Hospitalized since your last visit?\"    NO    “Have you seen or consulted any other health care providers outside of [facility] since your last visit?”      Yes  Dr. Nevin King  Psychiatry  2024       Vitals:    24 1159   BP: 114/71   Pulse: 66   Resp: 16   Temp: 96.9 °F (36.1 °C)   SpO2: 98%     Health Maintenance Due   Topic Date Due    Respiratory Syncytial Virus (RSV) Pregnant or age 60 yrs+ (1 - 1-dose 60+ series) Never done    Pneumococcal 65+ years Vaccine (2 of 2 - PCV) 2022    COVID-19 Vaccine (3 -  season) 2023    Lipids  2024    Depression Screen  2024

## 2024-06-14 NOTE — TELEPHONE ENCOUNTER
PCP: Mustapha Estrada MD    Last Visit 10/18/2023   Future Appointments   Date Time Provider Department Center   9/3/2024 11:00 AM Mustapha Estrada MD [facility] AMB       Requested Prescriptions     Pending Prescriptions Disp Refills    magnesium oxide (MAG-OX) 400 (240 Mg) MG tablet [Pharmacy Med Name: MAGNESIUM OXIDE 400 MG TABLET] 30 tablet 2     Sig: TAKE 1 TABLET BY MOUTH DAILY         Other Comments: Last Refill 03/12/2024

## 2024-06-16 RX ORDER — LANOLIN ALCOHOL/MO/W.PET/CERES
400 CREAM (GRAM) TOPICAL DAILY
Qty: 30 TABLET | Refills: 2 | Status: SHIPPED | OUTPATIENT
Start: 2024-06-16

## 2024-06-19 LAB
M TB IFN-G BLD-IMP: NORMAL
M TB IFN-G CD4+ T-CELLS BLD-ACNC: NORMAL IU/ML
M TBIFN-G CD4+ CD8+T-CELLS BLD-ACNC: NORMAL IU/ML
QUANTIFERON CRITERIA: NORMAL
QUANTIFERON MITOGEN VALUE: NORMAL IU/ML
QUANTIFERON NIL VALUE: NORMAL IU/ML

## 2024-07-26 ENCOUNTER — HOSPITAL ENCOUNTER (EMERGENCY)
Facility: HOSPITAL | Age: 69
Discharge: HOME | End: 2024-07-26
Attending: EMERGENCY MEDICINE
Payer: MEDICARE

## 2024-07-26 ENCOUNTER — TELEPHONE (OUTPATIENT)
Age: 69
End: 2024-07-26

## 2024-07-26 VITALS
BODY MASS INDEX: 21.66 KG/M2 | TEMPERATURE: 98.4 F | SYSTOLIC BLOOD PRESSURE: 119 MMHG | OXYGEN SATURATION: 93 % | WEIGHT: 130 LBS | HEART RATE: 69 BPM | HEIGHT: 65 IN | DIASTOLIC BLOOD PRESSURE: 68 MMHG | RESPIRATION RATE: 16 BRPM

## 2024-07-26 DIAGNOSIS — K64.4 EXTERNAL HEMORRHOID, BLEEDING: ICD-10-CM

## 2024-07-26 DIAGNOSIS — K62.5 RECTAL BLEEDING: Primary | ICD-10-CM

## 2024-07-26 LAB
ALBUMIN SERPL-MCNC: 3.4 G/DL (ref 3.5–5)
ALBUMIN/GLOB SERPL: 0.8 (ref 1.1–2.2)
ALP SERPL-CCNC: 202 U/L (ref 45–117)
ALT SERPL-CCNC: 65 U/L (ref 12–78)
ANION GAP SERPL CALC-SCNC: 2 MMOL/L (ref 5–15)
AST SERPL-CCNC: 16 U/L (ref 15–37)
BASOPHILS # BLD: 0.1 K/UL (ref 0–0.1)
BASOPHILS NFR BLD: 1 % (ref 0–1)
BILIRUB SERPL-MCNC: 0.3 MG/DL (ref 0.2–1)
BUN SERPL-MCNC: 22 MG/DL (ref 6–20)
BUN/CREAT SERPL: 19 (ref 12–20)
CALCIUM SERPL-MCNC: 9.1 MG/DL (ref 8.5–10.1)
CHLORIDE SERPL-SCNC: 104 MMOL/L (ref 97–108)
CO2 SERPL-SCNC: 32 MMOL/L (ref 21–32)
CREAT SERPL-MCNC: 1.17 MG/DL (ref 0.7–1.3)
DIFFERENTIAL METHOD BLD: ABNORMAL
EOSINOPHIL # BLD: 0.1 K/UL (ref 0–0.4)
EOSINOPHIL NFR BLD: 1 % (ref 0–7)
ERYTHROCYTE [DISTWIDTH] IN BLOOD BY AUTOMATED COUNT: 12.2 % (ref 11.5–14.5)
GLOBULIN SER CALC-MCNC: 4.1 G/DL (ref 2–4)
GLUCOSE SERPL-MCNC: 117 MG/DL (ref 65–100)
HCT VFR BLD AUTO: 36.2 % (ref 36.6–50.3)
HGB BLD-MCNC: 12.6 G/DL (ref 12.1–17)
IMM GRANULOCYTES # BLD AUTO: 0 K/UL (ref 0–0.04)
IMM GRANULOCYTES NFR BLD AUTO: 1 % (ref 0–0.5)
LYMPHOCYTES # BLD: 1.2 K/UL (ref 0.8–3.5)
LYMPHOCYTES NFR BLD: 19 % (ref 12–49)
MCH RBC QN AUTO: 32.1 PG (ref 26–34)
MCHC RBC AUTO-ENTMCNC: 34.8 G/DL (ref 30–36.5)
MCV RBC AUTO: 92.3 FL (ref 80–99)
MONOCYTES # BLD: 0.6 K/UL (ref 0–1)
MONOCYTES NFR BLD: 9 % (ref 5–13)
NEUTS SEG # BLD: 4.5 K/UL (ref 1.8–8)
NEUTS SEG NFR BLD: 69 % (ref 32–75)
NRBC # BLD: 0 K/UL (ref 0–0.01)
NRBC BLD-RTO: 0 PER 100 WBC
PLATELET # BLD AUTO: 212 K/UL (ref 150–400)
PMV BLD AUTO: 9.9 FL (ref 8.9–12.9)
POTASSIUM SERPL-SCNC: 4.8 MMOL/L (ref 3.5–5.1)
PROT SERPL-MCNC: 7.5 G/DL (ref 6.4–8.2)
RBC # BLD AUTO: 3.92 M/UL (ref 4.1–5.7)
SODIUM SERPL-SCNC: 138 MMOL/L (ref 136–145)
WBC # BLD AUTO: 6.5 K/UL (ref 4.1–11.1)

## 2024-07-26 PROCEDURE — 99283 EMERGENCY DEPT VISIT LOW MDM: CPT

## 2024-07-26 PROCEDURE — 80053 COMPREHEN METABOLIC PANEL: CPT

## 2024-07-26 PROCEDURE — 85025 COMPLETE CBC W/AUTO DIFF WBC: CPT

## 2024-07-26 PROCEDURE — 36415 COLL VENOUS BLD VENIPUNCTURE: CPT

## 2024-07-26 RX ORDER — HYDROCORTISONE ACETATE 25 MG/1
25 SUPPOSITORY RECTAL 2 TIMES DAILY
Qty: 10 SUPPOSITORY | Refills: 0 | Status: SHIPPED | OUTPATIENT
Start: 2024-07-26

## 2024-07-26 RX ORDER — DOCUSATE SODIUM 100 MG/1
100 CAPSULE, LIQUID FILLED ORAL 2 TIMES DAILY
Qty: 60 CAPSULE | Refills: 0 | Status: SHIPPED | OUTPATIENT
Start: 2024-07-26 | End: 2024-08-25

## 2024-07-26 NOTE — DISCHARGE INSTRUCTIONS
Thank You!    It was a pleasure taking care of you in our Emergency Department today. We know that when you come to our Emergency Department, you are entrusting us with your health, comfort, and safety. Our physicians and nurses honor that trust, and truly appreciate the opportunity to care for you and your loved ones.      We also value your feedback. If you receive a survey about your Emergency Department experience today, please fill it out.  We care about our patients' feedback, and we listen to what you have to say.  Thank you.    Paulina Leger MD  ________________________________________________________________________  I have included a copy of your lab results and/or radiologic studies from today's visit so you can have them easily available at your follow-up visit. We hope you feel better and please do not hesitate to contact the ED if you have any questions at all!    Recent Results (from the past 12 hour(s))   CBC with Auto Differential    Collection Time: 07/26/24 11:10 AM   Result Value Ref Range    WBC 6.5 4.1 - 11.1 K/uL    RBC 3.92 (L) 4.10 - 5.70 M/uL    Hemoglobin 12.6 12.1 - 17.0 g/dL    Hematocrit 36.2 (L) 36.6 - 50.3 %    MCV 92.3 80.0 - 99.0 FL    MCH 32.1 26.0 - 34.0 PG    MCHC 34.8 30.0 - 36.5 g/dL    RDW 12.2 11.5 - 14.5 %    Platelets 212 150 - 400 K/uL    MPV 9.9 8.9 - 12.9 FL    Nucleated RBCs 0.0 0  WBC    nRBC 0.00 0.00 - 0.01 K/uL    Neutrophils % 69 32 - 75 %    Lymphocytes % 19 12 - 49 %    Monocytes % 9 5 - 13 %    Eosinophils % 1 0 - 7 %    Basophils % 1 0 - 1 %    Immature Granulocytes % 1 (H) 0.0 - 0.5 %    Neutrophils Absolute 4.5 1.8 - 8.0 K/UL    Lymphocytes Absolute 1.2 0.8 - 3.5 K/UL    Monocytes Absolute 0.6 0.0 - 1.0 K/UL    Eosinophils Absolute 0.1 0.0 - 0.4 K/UL    Basophils Absolute 0.1 0.0 - 0.1 K/UL    Immature Granulocytes Absolute 0.0 0.00 - 0.04 K/UL    Differential Type AUTOMATED     Comprehensive Metabolic Panel    Collection Time: 07/26/24 11:10 AM

## 2024-07-26 NOTE — ED PROVIDER NOTES
Protein 7.5 6.4 - 8.2 g/dL    Albumin 3.4 (L) 3.5 - 5.0 g/dL    Globulin 4.1 (H) 2.0 - 4.0 g/dL    Albumin/Globulin Ratio 0.8 (L) 1.1 - 2.2         EKG: If performed, independent interpretation documented below in the MDM section     RADIOLOGY:  Non-plain film images such as CT, Ultrasound and MRI are read by the radiologist. Plain radiographic images are visualized and preliminarily interpreted by the ED Provider with the findings documented in the MDM section.     Interpretation per the Radiologist below, if available at the time of this note:     No orders to display        PROCEDURES   Unless otherwise noted below, none  Procedures     CRITICAL CARE TIME   none    EMERGENCY DEPARTMENT COURSE and DIFFERENTIAL DIAGNOSIS/MDM   Vitals:    Vitals:    07/26/24 1030 07/26/24 1045 07/26/24 1100 07/26/24 1115   BP: 136/78 126/65 118/65 119/68   Pulse: 69      Resp: 16      Temp: 98.4 °F (36.9 °C)      TempSrc: Oral      SpO2: 98% 96% 95% 93%   Weight: 59 kg (130 lb)      Height: 1.651 m (5' 5\")           Patient was given the following medications:  Medications - No data to display    Medical Decision Making  69-year-old male with history of  intellectual disability, nonverbal at baseline, who presents with a chief complaint of rectal bleeding.  Apparently, patient was at his day support and he had a large bowel movement.  Caregiver reports he was apparently straining to do so and after he had the bowel movement they noted some blood on the toilet paper so sent him to the ER.  Patient has been otherwise in his normal state of health per caregiver at the bedside.  Caregiver reports he is at his mental status baseline.  He was previously on medications for constipation but was having diarrhea so these were discontinued.  No recent nausea or vomiting.  Additional history review of system is limited by patient's baseline  mental status    On exam patient is overall nontoxic-appearing, hemodynamically stable.  Exam with

## 2024-07-26 NOTE — ED NOTES
Patient discharged by KEVIN Borja Discharge instructions reviewed with patient's caregiver. Patient's caregiver verbalized understanding. Patient's discharge paperwork given to caregiver. Patient ambulated with steady gait out of ED with caregiver. No acute distress noted at this time.

## 2024-07-29 ENCOUNTER — TELEPHONE (OUTPATIENT)
Age: 69
End: 2024-07-29

## 2024-08-01 ENCOUNTER — CLINICAL DOCUMENTATION (OUTPATIENT)
Age: 69
End: 2024-08-01

## 2024-08-14 RX ORDER — MULTIVITAMIN WITH FOLIC ACID 400 MCG
1 TABLET ORAL DAILY
Qty: 30 TABLET | Refills: 5 | Status: SHIPPED | OUTPATIENT
Start: 2024-08-14

## 2024-08-14 NOTE — TELEPHONE ENCOUNTER
PCP: Mustapha Estrada MD    Last Visit 10/18/2023   Future Appointments   Date Time Provider Department Center   9/3/2024 11:00 AM Mustapha Estrada MD [facility] DEP       Requested Prescriptions     Pending Prescriptions Disp Refills    Multiple Vitamin (HIGH POTENCY MULTIVITAMIN) TABS [Pharmacy Med Name: HIGH POTENCY MULTIVITAMIN TAB] 30 tablet 0     Sig: TAKE 1 TABLET BY MOUTH DAILY         Other Comments: Last Refill

## 2024-08-16 ENCOUNTER — CLINICAL DOCUMENTATION (OUTPATIENT)
Age: 69
End: 2024-08-16

## 2024-08-21 ENCOUNTER — CLINICAL DOCUMENTATION (OUTPATIENT)
Age: 69
End: 2024-08-21

## 2024-09-03 ENCOUNTER — OFFICE VISIT (OUTPATIENT)
Age: 69
End: 2024-09-03
Payer: MEDICARE

## 2024-09-03 VITALS
TEMPERATURE: 97.9 F | SYSTOLIC BLOOD PRESSURE: 120 MMHG | HEIGHT: 65 IN | OXYGEN SATURATION: 99 % | HEART RATE: 56 BPM | WEIGHT: 142.42 LBS | DIASTOLIC BLOOD PRESSURE: 78 MMHG | BODY MASS INDEX: 23.73 KG/M2 | RESPIRATION RATE: 16 BRPM

## 2024-09-03 DIAGNOSIS — K59.04 CHRONIC IDIOPATHIC CONSTIPATION: Primary | ICD-10-CM

## 2024-09-03 PROCEDURE — 99213 OFFICE O/P EST LOW 20 MIN: CPT | Performed by: STUDENT IN AN ORGANIZED HEALTH CARE EDUCATION/TRAINING PROGRAM

## 2024-09-03 PROCEDURE — 3017F COLORECTAL CA SCREEN DOC REV: CPT | Performed by: STUDENT IN AN ORGANIZED HEALTH CARE EDUCATION/TRAINING PROGRAM

## 2024-09-03 PROCEDURE — G8427 DOCREV CUR MEDS BY ELIG CLIN: HCPCS | Performed by: STUDENT IN AN ORGANIZED HEALTH CARE EDUCATION/TRAINING PROGRAM

## 2024-09-03 PROCEDURE — 1036F TOBACCO NON-USER: CPT | Performed by: STUDENT IN AN ORGANIZED HEALTH CARE EDUCATION/TRAINING PROGRAM

## 2024-09-03 PROCEDURE — 1123F ACP DISCUSS/DSCN MKR DOCD: CPT | Performed by: STUDENT IN AN ORGANIZED HEALTH CARE EDUCATION/TRAINING PROGRAM

## 2024-09-03 PROCEDURE — G8420 CALC BMI NORM PARAMETERS: HCPCS | Performed by: STUDENT IN AN ORGANIZED HEALTH CARE EDUCATION/TRAINING PROGRAM

## 2024-09-03 RX ORDER — DOCUSATE SODIUM 100 MG/1
100 CAPSULE, LIQUID FILLED ORAL EVERY MORNING
Qty: 30 CAPSULE | Refills: 5 | Status: SHIPPED | OUTPATIENT
Start: 2024-09-03 | End: 2025-03-02

## 2024-09-03 NOTE — PROGRESS NOTES
Chief Complaint   Patient presents with    Follow-up     2024 Hyperlipidemia, unspecified hyperlipidemia type       Accompanied By: Gabrielle ( Care Taker)     \"Have you been to the ER, urgent care clinic since your last visit?  Hospitalized since your last visit?\"      Yes  2024 (1 hours)  [facility] EMERGENCY DEPT  Rectal bleeding   “Have you seen or consulted any other health care providers outside of [facility] since your last visit?”      Yes  Scheduled for 2024  Dr. Nevin King  Psychiatry      Vitals:    24 1117   BP: 120/78   Pulse: 56   Resp: 16   Temp: 97.9 °F (36.6 °C)   SpO2: 99%      Health Maintenance Due   Topic Date Due    Respiratory Syncytial Virus (RSV) Pregnant or age 60 yrs+ (1 - 1-dose 60+ series) Never done    Pneumococcal 65+ years Vaccine (2 of 2 - PCV) 2022    Lipids  2024    Annual Wellness Visit (Medicare)  2024    Flu vaccine (1) 2024    COVID-19 Vaccine (3 -  season) 2024        The patient, Jules Bowie, identity was verified by name and .

## 2024-09-03 NOTE — PROGRESS NOTES
LAUR[facility]  [address] [place] [address] [phone]    C/C: Acute/chronic medical problems    HPI:    Jules Bowie is a 69 y.o.  White (non-)  male who presents to clinic today for evaluation of the issues listed below. P    Accompanied By: Gabrielle ( Care Taker)    Subjective;      Significant PMHx include: Seborrheic dermatitis, nonverbal at [facility], chronic constipation, Cryptorchism, Autism Spectrum Disorder, positional vertigo, Bilateral Maringotomy. Here for follow up    Pt lives in group home ([facility]) and nonverbal at baseline.    Constipation:  Pt was seen in the ER with blood in stool found to be due to hemorrhoids (7/27/2024). Started on colace for constipation which have worked well per caregiver and they would like refills. No more blood in stool.    HLD   Pt is doing well on current meds with no medication side effects noted   No new myalgias, no joint pains, no weakness   No TIA's, no chest pain on exertion, no dyspnea on exertion, no swelling of ankles.   Exercising -walking, staying active.    Pt denies any  fever, chill, chest pain, SOB, abdominal pain, n/v/d, HA or dizziness.       Other Specialists/providers:  Psychiatrist - Maik Perez  Neurology - Shar  Podiatrist  GI  ENT - chronic vertigo and hard of hearing.     Allergies- reviewed:   No Known Allergies    Past Medical History- reviewed:  Past Medical History:   Diagnosis Date    Asthma     PT ON DAILY INHALERS AND HAS A RESCUE INHALER    Ill-defined condition     MILD MR, AUTISM    Other ill-defined conditions(799.89)     elevated cholesterol    Other ill-defined conditions(799.89)     nonverbal,uses sign language some,mental retardation    Psychiatric disorder     MR,depression anxiety       Family History - reviewed:  No family history on file.    Social History - reviewed:  Social History     Socioeconomic History    Marital status: Single     Spouse name: Not on

## 2024-09-04 ENCOUNTER — CLINICAL DOCUMENTATION (OUTPATIENT)
Age: 69
End: 2024-09-04

## 2024-09-13 RX ORDER — LANOLIN ALCOHOL/MO/W.PET/CERES
400 CREAM (GRAM) TOPICAL DAILY
Qty: 30 TABLET | Refills: 0 | Status: SHIPPED | OUTPATIENT
Start: 2024-09-13

## 2024-09-25 ENCOUNTER — CLINICAL DOCUMENTATION (OUTPATIENT)
Age: 69
End: 2024-09-25

## 2024-10-24 RX ORDER — LANOLIN ALCOHOL/MO/W.PET/CERES
400 CREAM (GRAM) TOPICAL DAILY
Qty: 30 TABLET | Refills: 11 | Status: SHIPPED | OUTPATIENT
Start: 2024-10-24

## 2024-10-24 NOTE — TELEPHONE ENCOUNTER
Last appointment: 9/3/24  Next appointment: 11/15/24  Previous refill encounter(s): 9/13/24 #30    Requested Prescriptions     Pending Prescriptions Disp Refills    magnesium oxide (MAG-OX) 400 (240 Mg) MG tablet 30 tablet 11     Sig: Take 1 tablet by mouth daily         For Pharmacy Admin Tracking Only    Program: Medication Refill  CPA in place:    Recommendation Provided To:   Intervention Detail: New Rx: 1, reason: Patient Preference  Intervention Accepted By:   Gap Closed?:    Time Spent (min): 5

## 2024-11-15 ENCOUNTER — OFFICE VISIT (OUTPATIENT)
Age: 69
End: 2024-11-15

## 2024-11-15 VITALS
BODY MASS INDEX: 23.51 KG/M2 | WEIGHT: 141.09 LBS | OXYGEN SATURATION: 96 % | RESPIRATION RATE: 16 BRPM | TEMPERATURE: 97.4 F | HEIGHT: 65 IN | SYSTOLIC BLOOD PRESSURE: 120 MMHG | DIASTOLIC BLOOD PRESSURE: 64 MMHG | HEART RATE: 73 BPM

## 2024-11-15 DIAGNOSIS — Z00.00 MEDICARE ANNUAL WELLNESS VISIT, SUBSEQUENT: Primary | ICD-10-CM

## 2024-11-15 RX ORDER — VALBENAZINE 60 MG/1
CAPSULE ORAL
COMMUNITY
Start: 2024-10-28

## 2024-11-15 RX ORDER — 1.1% SODIUM FLUORIDE PRESCRIPTION DENTAL CREAM 5 MG/G
CREAM DENTAL
COMMUNITY
Start: 2024-10-17

## 2024-11-15 ASSESSMENT — PATIENT HEALTH QUESTIONNAIRE - PHQ9
SUM OF ALL RESPONSES TO PHQ QUESTIONS 1-9: 0
2. FEELING DOWN, DEPRESSED OR HOPELESS: NOT AT ALL
1. LITTLE INTEREST OR PLEASURE IN DOING THINGS: NOT AT ALL
SUM OF ALL RESPONSES TO PHQ9 QUESTIONS 1 & 2: 0
SUM OF ALL RESPONSES TO PHQ QUESTIONS 1-9: 0

## 2024-11-15 ASSESSMENT — LIFESTYLE VARIABLES
HOW OFTEN DO YOU HAVE A DRINK CONTAINING ALCOHOL: NEVER
HOW MANY STANDARD DRINKS CONTAINING ALCOHOL DO YOU HAVE ON A TYPICAL DAY: PATIENT DOES NOT DRINK

## 2024-11-15 NOTE — PROGRESS NOTES
Chief Complaint   Patient presents with    Medicare AWV     Accompanied By: Gabrielle ( Care Taker)     \"Have you been to the ER, urgent care clinic since your last visit?  Hospitalized since your last visit?\"    NO    “Have you seen or consulted any other health care providers outside of [facility] since your last visit?”    NO             Vitals:    11/15/24 1056 11/15/24 1108   BP: (!) 72/40 120/64   Site: Left Upper Arm Right Upper Arm   Position: Sitting Sitting   Cuff Size: Small Adult Small Adult   Pulse: 73    Resp: 16    Temp: 97.4 °F (36.3 °C)    TempSrc: Temporal    SpO2: 96%    Weight: 64 kg (141 lb 1.5 oz)    Height: 1.651 m (5' 5\")       Health Maintenance Due   Topic Date Due    Respiratory Syncytial Virus (RSV) Pregnant or age 60 yrs+ (1 - Risk 60-74 years 1-dose series) Never done    Lipids  2024    Annual Wellness Visit (Medicare)  2024    Flu vaccine (1) 2024    COVID-19 Vaccine (3 -  season) 2024      The patient, Jules Bowie, identity was verified by name and , pharmacy verified  Labs:Yes  Fasting:No         
MD Dario   Nutritional Supplements (BOOST HIGH PROTEIN) LIQD 4 cans  orally daily Yes Hardik Bailey MD   PEG 3616-KNn-RyDkq-NaCl-NaSulf (PEG-3350/ELECTROLYTES) 236 g SOLR  Yes Provider, MD Dario   albuterol sulfate HFA (PROVENTIL;VENTOLIN;PROAIR) 108 (90 Base) MCG/ACT inhaler Inhale 2 puffs into the lungs every 4 hours as needed Yes Automatic Reconciliation, Ar   azelastine (OPTIVAR) 0.05 % ophthalmic solution Apply 1 drop to eye 2 times daily Yes Automatic Reconciliation, Ar   buPROPion (WELLBUTRIN XL) 300 MG extended release tablet Take 1 tablet by mouth Yes Automatic Reconciliation, Ar   cetirizine (ZYRTEC) 10 MG tablet Take 1 tablet by mouth daily Yes Automatic Reconciliation, Ar   FLUoxetine (PROZAC) 20 MG capsule Take 1 capsule by mouth daily Yes Automatic Reconciliation, Ar   fluticasone (FLONASE) 50 MCG/ACT nasal spray 2 sprays by Nasal route daily Yes Automatic Reconciliation, Ar   hydrocortisone 2.5 % ointment APPLY TO AFFECTED AREA ON FACE NIGHTLY AS NEEDED FOR SEBORRHEIC DERMATITIS FLARE UPS Yes Automatic Reconciliation, Ar   montelukast (SINGULAIR) 10 MG tablet Take 1 tablet by mouth Yes Automatic Reconciliation, Ar   omeprazole (PRILOSEC) 20 MG delayed release capsule TAKE 1 CAPSULE BY MOUTH DAILY AS NEEDED Yes Automatic Reconciliation, Ar   acetaminophen (TYLENOL) 500 MG tablet TAKE 1 TABLET EVERY 6 HOURS AS NEEDED FOR HEADACHE OR FEVER (NOT TO EXCEED 6 TABS/DAY) Yes Automatic Reconciliation, Ar   meclizine (ANTIVERT) 25 MG tablet Take by mouth 3 times daily as needed  Patient not taking: Reported on 10/18/2023  Automatic Reconciliation, Ar       CareTeam (Including outside providers/suppliers regularly involved in providing care):   Patient Care Team:  Mustapha Estrada MD as PCP - General (Family Medicine)  Mustapha Estrada MD as PCP - Empaneled Provider      Reviewed and updated this visit:  Tobacco  Allergies  Meds  Med Hx  Surg Hx  Soc Hx  Fam Hx

## 2024-11-15 NOTE — PATIENT INSTRUCTIONS
You may also have this test when you get your 's license or apply for some types of jobs.  Visual field tests  These tests are used:  To check for vision loss in any area of your range of vision.  To screen for certain eye diseases.  To look for nerve damage after a stroke, head injury, or other problem that could reduce blood flow to the brain.  Refraction and color tests  A refraction test is done to find the right prescription for glasses and contact lenses.  A color vision test is done to check for color blindness.  Color vision is often tested as part of a routine exam. You may also have this test when you apply for a job where recognizing different colors is important, such as , electronics, or the .  How are vision tests done?  Visual acuity test   You cover one eye at a time.  You read aloud from a wall chart across the room.  You read aloud from a small card that you hold in your hand.  Refraction   You look into a special device.  The device puts lenses of different strengths in front of each eye to see how strong your glasses or contact lenses need to be.  Visual field tests   Your doctor may have you look through special machines.  Or your doctor may simply have you stare straight ahead while they move a finger into and out of your field of vision.  Color vision test   You look at pieces of printed test patterns in various colors. You say what number or symbol you see.  Your doctor may have you trace the number or symbol using a pointer.  How do these tests feel?  There is very little chance of having a problem from this test. If dilating drops are used for a vision test, they may make the eyes sting and cause a medicine taste in the mouth.  Follow-up care is a key part of your treatment and safety. Be sure to make and go to all appointments, and call your doctor if you are having problems. It's also a good idea to know your test results and keep a list of the medicines you

## 2024-12-09 ENCOUNTER — TELEPHONE (OUTPATIENT)
Age: 69
End: 2024-12-09

## 2024-12-09 NOTE — TELEPHONE ENCOUNTER
Patient care taker Katharine would like for  to send in a RX for cough medicine she can be reached @ [phone]

## 2024-12-10 ENCOUNTER — TELEMEDICINE (OUTPATIENT)
Age: 69
End: 2024-12-10
Payer: MEDICARE

## 2024-12-10 DIAGNOSIS — J06.9 ACUTE URI: Primary | ICD-10-CM

## 2024-12-10 PROCEDURE — 1159F MED LIST DOCD IN RCRD: CPT | Performed by: STUDENT IN AN ORGANIZED HEALTH CARE EDUCATION/TRAINING PROGRAM

## 2024-12-10 PROCEDURE — 1123F ACP DISCUSS/DSCN MKR DOCD: CPT | Performed by: STUDENT IN AN ORGANIZED HEALTH CARE EDUCATION/TRAINING PROGRAM

## 2024-12-10 PROCEDURE — G8427 DOCREV CUR MEDS BY ELIG CLIN: HCPCS | Performed by: STUDENT IN AN ORGANIZED HEALTH CARE EDUCATION/TRAINING PROGRAM

## 2024-12-10 PROCEDURE — 3017F COLORECTAL CA SCREEN DOC REV: CPT | Performed by: STUDENT IN AN ORGANIZED HEALTH CARE EDUCATION/TRAINING PROGRAM

## 2024-12-10 PROCEDURE — 99213 OFFICE O/P EST LOW 20 MIN: CPT | Performed by: STUDENT IN AN ORGANIZED HEALTH CARE EDUCATION/TRAINING PROGRAM

## 2024-12-10 RX ORDER — GUAIFENESIN 600 MG/1
600 TABLET, EXTENDED RELEASE ORAL 2 TIMES DAILY
Qty: 30 TABLET | Refills: 0 | Status: SHIPPED | OUTPATIENT
Start: 2024-12-10 | End: 2024-12-25

## 2024-12-10 RX ORDER — ACETAMINOPHEN 500 MG
500 TABLET ORAL EVERY 6 HOURS PRN
Qty: 90 TABLET | Refills: 1 | Status: SHIPPED | OUTPATIENT
Start: 2024-12-10

## 2024-12-10 NOTE — TELEPHONE ENCOUNTER
Gabrielle stated he has been coughing for x 3 days. Patient is non-verbal . Also pointing to his throat as bothering him. Denies any fever. Scheduled for 12/10/2024 @ 11:20 am Virtual

## 2024-12-10 NOTE — PROGRESS NOTES
LAUREN [facility]  [address]  [place] [id number] [phone]    Chief Complaint: acute URI    Subjective  Jules Bowie is a 69 y.o. White (non-) male , established patient, here for evaluation of the concern(s) above;    SUBJECTIVE:     Pt would like to be evaluated for the problem(s) listed above:    Assisted by: Care Taker Gabrielle     Patient is non verbal.    Per caregiver, over the past 2 days patient has developed cold symptoms characterized by sore throat, and cough.   Caregiver states that patient has not had any  fever, chills, chest pain, shortness of breath, nausea/vomiting, wheezing. Denies any dyspnea, blood in the sputum, tachypnea, or tachycardia.       Review of systems:       A comprehensive review of systems was negative except for that written in the History of Present Illness.         PHYSICAL EXAM:     General: In no distress per caregiver                   Due to this being a TeleHealth evaluation, many elements of the physical examination are unable to be assessed.        ASSESSMENT/PLAN:     1. Acute URI  -     Benzocaine-Menthol (CEPACOL EXTRA STRENGTH) 15-2.6 MG LOZG lozenge; Take 1 lozenge by mouth every 2 hours as needed for Sore Throat, Disp-18 lozenge, R-0Normal  -     guaiFENesin (MUCINEX) 600 MG extended release tablet; Take 1 tablet by mouth 2 times daily for 15 days, Disp-30 tablet, R-0Normal  -     acetaminophen (TYLENOL) 500 MG tablet; Take 1 tablet by mouth every 6 hours as needed for Pain or Fever, Disp-90 tablet, R-1Normal    - Discussed precautions with patient's caregiver  - RTC prn for review if persistent symptoms, or go to the ER if worsening       We discussed the expected course, resolution and complications of the diagnosis(es) in detail. Patient was in [place] at the time of consultation. Medication risks, benefits, costs, interactions, and alternatives were discussed as indicated.  I advised her to contact

## 2024-12-10 NOTE — PROGRESS NOTES
Chief Complaint   Patient presents with    Cough    Pharyngitis     X 3 days      Assisted by: Care Taker Gabrielle    Patient is non verbal    Gabrielle stated he has been coughing for x 3 days. Patient is non-verbal . Also pointing to his throat as bothering him. Denies any fever. Care taker requesting pain medication.    \"Have you been to the ER, urgent care clinic since your last visit?  Hospitalized since your last visit?\"    NO    “Have you seen or consulted any other health care providers outside of [facility] since your last visit?”    NO             There were no vitals filed for this visit.   Health Maintenance Due   Topic Date Due    Respiratory Syncytial Virus (RSV) Pregnant or age 60 yrs+ (1 - Risk 60-74 years 1-dose series) Never done    Lipids  2024    Flu vaccine (1) 2024    COVID-19 Vaccine (3 - - season) 2024      The patient, Jules Bowie, identity was verified by name and , pharmacy verified  Labs:N/A  Fasting:N/A

## 2024-12-19 ENCOUNTER — CLINICAL DOCUMENTATION (OUTPATIENT)
Age: 69
End: 2024-12-19

## 2024-12-23 ENCOUNTER — CLINICAL DOCUMENTATION (OUTPATIENT)
Age: 69
End: 2024-12-23

## 2025-02-12 DIAGNOSIS — K59.04 CHRONIC IDIOPATHIC CONSTIPATION: ICD-10-CM

## 2025-02-12 RX ORDER — DOCUSATE SODIUM 100 MG/1
100 CAPSULE, LIQUID FILLED ORAL EVERY MORNING
Qty: 30 CAPSULE | Refills: 12 | Status: SHIPPED | OUTPATIENT
Start: 2025-02-12

## 2025-02-13 RX ORDER — MULTIVITAMIN WITH FOLIC ACID 400 MCG
1 TABLET ORAL DAILY
Qty: 30 TABLET | Refills: 12 | OUTPATIENT
Start: 2025-02-13

## 2025-02-14 RX ORDER — MULTIVITAMIN WITH FOLIC ACID 400 MCG
1 TABLET ORAL DAILY
Qty: 30 TABLET | Refills: 12 | Status: SHIPPED | OUTPATIENT
Start: 2025-02-14

## 2025-02-14 NOTE — TELEPHONE ENCOUNTER
Last appointment: 11/15/24  Next appointment: 5/16/25  Previous refill encounter(s): 8/14/24 #30 with 5 refills    Requested Prescriptions     Pending Prescriptions Disp Refills    Multiple Vitamin (MULTIVITAMIN) tablet [Pharmacy Med Name: TAB-A-TETO TABLET] 30 tablet 12     Sig: TAKE 1 TABLET BY MOUTH DAILY         For Pharmacy Admin Tracking Only    Program: Medication Refill  CPA in place:    Recommendation Provided To:   Intervention Detail: New Rx: 1, reason: Patient Preference  Intervention Accepted By:   Gap Closed?:    Time Spent (min): 5

## 2025-03-21 RX ORDER — POLYETHYLENE GLYCOL 3350 17 G/17G
POWDER, FOR SOLUTION ORAL
Qty: 510 G | Refills: 0 | OUTPATIENT
Start: 2025-03-21

## 2025-03-21 RX ORDER — BISMUTH SUBSALICYLATE 525 MG/15ML
SUSPENSION ORAL
Qty: 12 TABLET | Refills: 0 | OUTPATIENT
Start: 2025-03-21

## 2025-03-24 RX ORDER — POLYETHYLENE GLYCOL 3350 17 G/17G
17 POWDER, FOR SOLUTION ORAL DAILY PRN
Qty: 510 G | Refills: 0 | Status: SHIPPED | OUTPATIENT
Start: 2025-03-24 | End: 2025-04-23

## 2025-03-24 RX ORDER — LOPERAMIDE HYDROCHLORIDE 2 MG/1
2 TABLET ORAL 4 TIMES DAILY PRN
Qty: 12 TABLET | Refills: 0 | Status: SHIPPED | OUTPATIENT
Start: 2025-03-24 | End: 2025-03-28

## 2025-03-28 RX ORDER — BISMUTH SUBSALICYLATE 525 MG/15ML
SUSPENSION ORAL
Qty: 12 TABLET | Refills: 0 | Status: SHIPPED | OUTPATIENT
Start: 2025-03-28

## 2025-03-28 NOTE — TELEPHONE ENCOUNTER
Last appointment: 12/10/24  Next appointment: 5/16/25  Previous refill encounter(s): 3/24/24 #12    Requested Prescriptions     Pending Prescriptions Disp Refills    loperamide (ANTI-DIARRHEAL) 2 MG tablet [Pharmacy Med Name: ANTI-DIARRHEAL 2 MG CAPLET] 12 tablet 0     Sig: TAKE (1) TABLET BY MOUTH 4 TIMES A DAY AS NEEDED FOR DIARRHEA         For Pharmacy Admin Tracking Only    Program: Medication Refill  CPA in place:    Recommendation Provided To:   Intervention Detail: New Rx: 1, reason: Patient Preference  Intervention Accepted By:   Gap Closed?:    Time Spent (min): 5

## 2025-05-12 DIAGNOSIS — N40.1 BENIGN PROSTATIC HYPERPLASIA WITH LOWER URINARY TRACT SYMPTOMS, SYMPTOM DETAILS UNSPECIFIED: ICD-10-CM

## 2025-05-12 DIAGNOSIS — E78.5 HYPERLIPIDEMIA, UNSPECIFIED HYPERLIPIDEMIA TYPE: ICD-10-CM

## 2025-05-12 RX ORDER — ATORVASTATIN CALCIUM 10 MG/1
10 TABLET, FILM COATED ORAL NIGHTLY
Qty: 30 TABLET | Refills: 5 | Status: SHIPPED | OUTPATIENT
Start: 2025-05-12

## 2025-05-12 RX ORDER — FINASTERIDE 5 MG/1
5 TABLET, FILM COATED ORAL DAILY
Qty: 30 TABLET | Refills: 5 | Status: SHIPPED | OUTPATIENT
Start: 2025-05-12

## 2025-05-12 NOTE — TELEPHONE ENCOUNTER
Last appointment: 11/15/24  Next appointment: 5/16/25  Previous refill encounter(s): 5/31/24    Requested Prescriptions     Pending Prescriptions Disp Refills    atorvastatin (LIPITOR) 10 MG tablet 30 tablet 5     Sig: Take 1 tablet by mouth nightly    finasteride (PROSCAR) 5 MG tablet 30 tablet 5     Sig: Take 1 tablet by mouth daily         For Pharmacy Admin Tracking Only    Program: Medication Refill  CPA in place:    Recommendation Provided To:   Intervention Detail: New Rx: 2, reason: Patient Preference  Intervention Accepted By:   Gap Closed?:    Time Spent (min): 5

## 2025-05-14 ENCOUNTER — CLINICAL DOCUMENTATION (OUTPATIENT)
Age: 70
End: 2025-05-14

## 2025-05-16 ENCOUNTER — OFFICE VISIT (OUTPATIENT)
Age: 70
End: 2025-05-16
Payer: MEDICARE

## 2025-05-16 VITALS
OXYGEN SATURATION: 99 % | RESPIRATION RATE: 16 BRPM | HEIGHT: 65 IN | WEIGHT: 136.69 LBS | SYSTOLIC BLOOD PRESSURE: 121 MMHG | BODY MASS INDEX: 22.77 KG/M2 | HEART RATE: 67 BPM | DIASTOLIC BLOOD PRESSURE: 69 MMHG | TEMPERATURE: 97.5 F

## 2025-05-16 DIAGNOSIS — E78.2 MIXED HYPERLIPIDEMIA: Primary | ICD-10-CM

## 2025-05-16 DIAGNOSIS — E78.2 MIXED HYPERLIPIDEMIA: ICD-10-CM

## 2025-05-16 DIAGNOSIS — R73.09 ABNORMAL GLUCOSE: ICD-10-CM

## 2025-05-16 DIAGNOSIS — R56.9 SEIZURES (HCC): ICD-10-CM

## 2025-05-16 LAB
ANION GAP SERPL CALC-SCNC: 5 MMOL/L (ref 2–12)
BUN SERPL-MCNC: 15 MG/DL (ref 6–20)
BUN/CREAT SERPL: 13 (ref 12–20)
CALCIUM SERPL-MCNC: 8.9 MG/DL (ref 8.5–10.1)
CHLORIDE SERPL-SCNC: 106 MMOL/L (ref 97–108)
CHOLEST SERPL-MCNC: 110 MG/DL
CO2 SERPL-SCNC: 29 MMOL/L (ref 21–32)
CREAT SERPL-MCNC: 1.17 MG/DL (ref 0.7–1.3)
GLUCOSE SERPL-MCNC: 103 MG/DL (ref 65–100)
HDLC SERPL-MCNC: 44 MG/DL
HDLC SERPL: 2.5 (ref 0–5)
LDLC SERPL CALC-MCNC: 43.4 MG/DL (ref 0–100)
POTASSIUM SERPL-SCNC: 4.3 MMOL/L (ref 3.5–5.1)
SODIUM SERPL-SCNC: 140 MMOL/L (ref 136–145)
TRIGL SERPL-MCNC: 113 MG/DL
VLDLC SERPL CALC-MCNC: 22.6 MG/DL

## 2025-05-16 PROCEDURE — G8420 CALC BMI NORM PARAMETERS: HCPCS | Performed by: STUDENT IN AN ORGANIZED HEALTH CARE EDUCATION/TRAINING PROGRAM

## 2025-05-16 PROCEDURE — G8427 DOCREV CUR MEDS BY ELIG CLIN: HCPCS | Performed by: STUDENT IN AN ORGANIZED HEALTH CARE EDUCATION/TRAINING PROGRAM

## 2025-05-16 PROCEDURE — 1123F ACP DISCUSS/DSCN MKR DOCD: CPT | Performed by: STUDENT IN AN ORGANIZED HEALTH CARE EDUCATION/TRAINING PROGRAM

## 2025-05-16 PROCEDURE — 1159F MED LIST DOCD IN RCRD: CPT | Performed by: STUDENT IN AN ORGANIZED HEALTH CARE EDUCATION/TRAINING PROGRAM

## 2025-05-16 PROCEDURE — 3017F COLORECTAL CA SCREEN DOC REV: CPT | Performed by: STUDENT IN AN ORGANIZED HEALTH CARE EDUCATION/TRAINING PROGRAM

## 2025-05-16 PROCEDURE — 1036F TOBACCO NON-USER: CPT | Performed by: STUDENT IN AN ORGANIZED HEALTH CARE EDUCATION/TRAINING PROGRAM

## 2025-05-16 PROCEDURE — 99213 OFFICE O/P EST LOW 20 MIN: CPT | Performed by: STUDENT IN AN ORGANIZED HEALTH CARE EDUCATION/TRAINING PROGRAM

## 2025-05-16 SDOH — ECONOMIC STABILITY: FOOD INSECURITY: WITHIN THE PAST 12 MONTHS, YOU WORRIED THAT YOUR FOOD WOULD RUN OUT BEFORE YOU GOT MONEY TO BUY MORE.: NEVER TRUE

## 2025-05-16 SDOH — ECONOMIC STABILITY: FOOD INSECURITY: WITHIN THE PAST 12 MONTHS, THE FOOD YOU BOUGHT JUST DIDN'T LAST AND YOU DIDN'T HAVE MONEY TO GET MORE.: NEVER TRUE

## 2025-05-16 ASSESSMENT — PATIENT HEALTH QUESTIONNAIRE - PHQ9
SUM OF ALL RESPONSES TO PHQ QUESTIONS 1-9: 0
2. FEELING DOWN, DEPRESSED OR HOPELESS: NOT AT ALL
1. LITTLE INTEREST OR PLEASURE IN DOING THINGS: NOT AT ALL
SUM OF ALL RESPONSES TO PHQ QUESTIONS 1-9: 0

## 2025-05-16 NOTE — PROGRESS NOTES
LAUREN [facility]  [address] [place] [address] [phone]    C/C: Acute/chronic medical problems    HPI:    Jules Bowie is a 70 y.o.  White (non-)  male who presents to clinic today for evaluation of the issues listed below.     Accompanied By: Gabrielle ( Care Taker)    Subjective;      Significant PMHx include: Seborrheic dermatitis, nonverbal at [facility], chronic constipation, Cryptorchism, Autism Spectrum Disorder, positional vertigo, Bilateral Maringotomy. Here for follow up    Pt lives in group home ([facility]) and nonverbal at baseline.      HLD   Pt is doing well on current meds with no medication side effects noted   No new myalgias, no joint pains, no weakness   No TIA's, no chest pain on exertion, no dyspnea on exertion, no swelling of ankles.   Exercising -walking, staying active.    Constipation:  Improved on stool softeners.    Pt denies any  fever, chill, chest pain, SOB, abdominal pain, n/v/d, HA or dizziness.       Other Specialists/providers:  Psychiatrist - Maik Perez  Neurology - Shar  Podiatrist  GI  ENT - chronic vertigo and hard of hearing.     Allergies- reviewed:   No Known Allergies    Past Medical History- reviewed:  Past Medical History:   Diagnosis Date    Asthma     PT ON DAILY INHALERS AND HAS A RESCUE INHALER    Ill-defined condition     MILD MR, AUTISM    Other ill-defined conditions(799.89)     elevated cholesterol    Other ill-defined conditions(799.89)     nonverbal,uses sign language some,mental retardation    Psychiatric disorder     MR,depression anxiety       Family History - reviewed:  No family history on file.    Social History - reviewed:  Social History     Socioeconomic History    Marital status: Single     Spouse name: Not on file    Number of children: Not on file    Years of education: Not on file    Highest education level: Not on file   Occupational History    Not on file   Tobacco Use    Smoking

## 2025-05-16 NOTE — PROGRESS NOTES
Chief Complaint   Patient presents with    6 Month Follow-Up     Last Seen 12/10/2024 Telemedicine Acute URI         Assisted by: Care Taker Gabrielle     \"Have you been to the ER, urgent care clinic since your last visit?  Hospitalized since your last visit?\"    NO    “Have you seen or consulted any other health care providers outside of [facility] since your last visit?”    NO             Vitals:    25 1044   BP: 121/69   Pulse: 67   Resp: 16   Temp: 97.5 °F (36.4 °C)   TempSrc: Temporal   SpO2: 99%   Weight: 62 kg (136 lb 11 oz)   Height: 1.651 m (5' 5\")      Health Maintenance Due   Topic Date Due    Respiratory Syncytial Virus (RSV) Pregnant or age 60 yrs+ (1 - Risk 60-74 years 1-dose series) Never done    Lipids  2024    COVID-19 Vaccine (3 - 2024-25 season) 2024      The patient, Jules Bowie, identity was verified by name and , pharmacy verified  Labs:Yes  Fasting:No

## 2025-05-22 ENCOUNTER — CLINICAL DOCUMENTATION (OUTPATIENT)
Age: 70
End: 2025-05-22

## 2025-05-29 ENCOUNTER — RESULTS FOLLOW-UP (OUTPATIENT)
Age: 70
End: 2025-05-29

## 2025-06-05 ENCOUNTER — TELEPHONE (OUTPATIENT)
Age: 70
End: 2025-06-05

## 2025-06-05 NOTE — TELEPHONE ENCOUNTER
Tiffany with [facility] wanted to check on the status of the fax that was sent on 5/28 for nutrition supplies that was needing to be corrected. She can be reached at [phone].

## 2025-06-10 ENCOUNTER — CLINICAL DOCUMENTATION (OUTPATIENT)
Age: 70
End: 2025-06-10

## 2025-07-08 ENCOUNTER — TELEPHONE (OUTPATIENT)
Age: 70
End: 2025-07-08

## 2025-07-23 ENCOUNTER — APPOINTMENT (OUTPATIENT)
Facility: HOSPITAL | Age: 70
DRG: 149 | End: 2025-07-23
Payer: MEDICARE

## 2025-07-23 ENCOUNTER — HOSPITAL ENCOUNTER (INPATIENT)
Facility: HOSPITAL | Age: 70
LOS: 2 days | Discharge: HOME | DRG: 149 | End: 2025-07-26
Attending: EMERGENCY MEDICINE | Admitting: FAMILY MEDICINE
Payer: MEDICARE

## 2025-07-23 DIAGNOSIS — R56.9 SEIZURE (HCC): ICD-10-CM

## 2025-07-23 DIAGNOSIS — I63.9 CEREBROVASCULAR ACCIDENT (CVA), UNSPECIFIED MECHANISM (HCC): ICD-10-CM

## 2025-07-23 DIAGNOSIS — R29.90 STROKE-LIKE SYMPTOMS: Primary | ICD-10-CM

## 2025-07-23 DIAGNOSIS — I72.0 CAROTID ANEURYSM, LEFT: ICD-10-CM

## 2025-07-23 PROBLEM — R42 VERTIGO DUE TO CEREBROVASCULAR DISEASE: Status: ACTIVE | Noted: 2025-07-23

## 2025-07-23 PROBLEM — I67.9 VERTIGO DUE TO CEREBROVASCULAR DISEASE: Status: ACTIVE | Noted: 2025-07-23

## 2025-07-23 LAB
ALBUMIN SERPL-MCNC: 2.6 G/DL (ref 3.5–5)
ALBUMIN/GLOB SERPL: 0.8 (ref 1.1–2.2)
ALP SERPL-CCNC: 94 U/L (ref 45–117)
ALT SERPL-CCNC: 30 U/L (ref 12–78)
ANION GAP SERPL CALC-SCNC: 5 MMOL/L (ref 2–12)
AST SERPL-CCNC: 17 U/L (ref 15–37)
BASOPHILS # BLD: 0.03 K/UL (ref 0–0.1)
BASOPHILS NFR BLD: 0.6 % (ref 0–1)
BILIRUB SERPL-MCNC: 0.2 MG/DL (ref 0.2–1)
BUN SERPL-MCNC: 15 MG/DL (ref 6–20)
BUN/CREAT SERPL: 13 (ref 12–20)
CALCIUM SERPL-MCNC: 8.1 MG/DL (ref 8.5–10.1)
CHLORIDE SERPL-SCNC: 109 MMOL/L (ref 97–108)
CO2 SERPL-SCNC: 27 MMOL/L (ref 21–32)
CREAT SERPL-MCNC: 1.15 MG/DL (ref 0.7–1.3)
DIFFERENTIAL METHOD BLD: ABNORMAL
EOSINOPHIL # BLD: 0.06 K/UL (ref 0–0.4)
EOSINOPHIL NFR BLD: 1.1 % (ref 0–7)
ERYTHROCYTE [DISTWIDTH] IN BLOOD BY AUTOMATED COUNT: 11.9 % (ref 11.5–14.5)
GLOBULIN SER CALC-MCNC: 3.3 G/DL (ref 2–4)
GLUCOSE SERPL-MCNC: 61 MG/DL (ref 65–100)
HCT VFR BLD AUTO: 32.7 % (ref 36.6–50.3)
HGB BLD-MCNC: 10.6 G/DL (ref 12.1–17)
IMM GRANULOCYTES # BLD AUTO: 0.03 K/UL (ref 0–0.04)
IMM GRANULOCYTES NFR BLD AUTO: 0.6 % (ref 0–0.5)
INR PPP: 1.1 (ref 0.9–1.1)
LYMPHOCYTES # BLD: 1.22 K/UL (ref 0.8–3.5)
LYMPHOCYTES NFR BLD: 22.9 % (ref 12–49)
MCH RBC QN AUTO: 31.5 PG (ref 26–34)
MCHC RBC AUTO-ENTMCNC: 32.4 G/DL (ref 30–36.5)
MCV RBC AUTO: 97.3 FL (ref 80–99)
MONOCYTES # BLD: 0.49 K/UL (ref 0–1)
MONOCYTES NFR BLD: 9.2 % (ref 5–13)
NEUTS SEG # BLD: 3.5 K/UL (ref 1.8–8)
NEUTS SEG NFR BLD: 65.6 % (ref 32–75)
NRBC # BLD: 0 K/UL (ref 0–0.01)
NRBC BLD-RTO: 0 PER 100 WBC
PLATELET # BLD AUTO: 239 K/UL (ref 150–400)
PMV BLD AUTO: 9.8 FL (ref 8.9–12.9)
POTASSIUM SERPL-SCNC: 4 MMOL/L (ref 3.5–5.1)
PROT SERPL-MCNC: 5.9 G/DL (ref 6.4–8.2)
PROTHROMBIN TIME: 11.4 SEC (ref 9.2–11.2)
RBC # BLD AUTO: 3.36 M/UL (ref 4.1–5.7)
SODIUM SERPL-SCNC: 141 MMOL/L (ref 136–145)
T4 FREE SERPL-MCNC: 1 NG/DL (ref 0.8–1.5)
TROPONIN I SERPL HS-MCNC: 6 NG/L (ref 0–76)
TSH SERPL DL<=0.05 MIU/L-ACNC: 0.6 UIU/ML (ref 0.36–3.74)
WBC # BLD AUTO: 5.3 K/UL (ref 4.1–11.1)

## 2025-07-23 PROCEDURE — 93005 ELECTROCARDIOGRAM TRACING: CPT | Performed by: EMERGENCY MEDICINE

## 2025-07-23 PROCEDURE — 84439 ASSAY OF FREE THYROXINE: CPT

## 2025-07-23 PROCEDURE — 80053 COMPREHEN METABOLIC PANEL: CPT

## 2025-07-23 PROCEDURE — 6370000000 HC RX 637 (ALT 250 FOR IP): Performed by: EMERGENCY MEDICINE

## 2025-07-23 PROCEDURE — 6360000002 HC RX W HCPCS: Performed by: INTERNAL MEDICINE

## 2025-07-23 PROCEDURE — 70450 CT HEAD/BRAIN W/O DYE: CPT

## 2025-07-23 PROCEDURE — 95706 EEG WO VID 2-12HR INTMT MNTR: CPT

## 2025-07-23 PROCEDURE — 4A03X5D MEASUREMENT OF ARTERIAL FLOW, INTRACRANIAL, EXTERNAL APPROACH: ICD-10-PCS | Performed by: RADIOLOGY

## 2025-07-23 PROCEDURE — 96374 THER/PROPH/DIAG INJ IV PUSH: CPT

## 2025-07-23 PROCEDURE — 96372 THER/PROPH/DIAG INJ SC/IM: CPT

## 2025-07-23 PROCEDURE — 2580000003 HC RX 258: Performed by: EMERGENCY MEDICINE

## 2025-07-23 PROCEDURE — 6370000000 HC RX 637 (ALT 250 FOR IP): Performed by: INTERNAL MEDICINE

## 2025-07-23 PROCEDURE — APPNB45 APP NON BILLABLE 31-45 MINUTES

## 2025-07-23 PROCEDURE — G0378 HOSPITAL OBSERVATION PER HR: HCPCS

## 2025-07-23 PROCEDURE — 96361 HYDRATE IV INFUSION ADD-ON: CPT

## 2025-07-23 PROCEDURE — 95957 EEG DIGITAL ANALYSIS: CPT

## 2025-07-23 PROCEDURE — 70496 CT ANGIOGRAPHY HEAD: CPT

## 2025-07-23 PROCEDURE — 71045 X-RAY EXAM CHEST 1 VIEW: CPT

## 2025-07-23 PROCEDURE — 6360000002 HC RX W HCPCS: Performed by: EMERGENCY MEDICINE

## 2025-07-23 PROCEDURE — 85025 COMPLETE CBC W/AUTO DIFF WBC: CPT

## 2025-07-23 PROCEDURE — 99222 1ST HOSP IP/OBS MODERATE 55: CPT | Performed by: NURSE PRACTITIONER

## 2025-07-23 PROCEDURE — 6360000004 HC RX CONTRAST MEDICATION: Performed by: RADIOLOGY

## 2025-07-23 PROCEDURE — 84443 ASSAY THYROID STIM HORMONE: CPT

## 2025-07-23 PROCEDURE — XX20X89 MONITORING OF BRAIN ELECTRICAL ACTIVITY, COMPUTER-AIDED DETECTION AND NOTIFICATION, NEW TECHNOLOGY GROUP 9: ICD-10-PCS | Performed by: PSYCHIATRY & NEUROLOGY

## 2025-07-23 PROCEDURE — 95717 EEG PHYS/QHP 2-12 HR W/O VID: CPT | Performed by: PSYCHIATRY & NEUROLOGY

## 2025-07-23 PROCEDURE — 85610 PROTHROMBIN TIME: CPT

## 2025-07-23 PROCEDURE — 99285 EMERGENCY DEPT VISIT HI MDM: CPT

## 2025-07-23 PROCEDURE — 0042T CT BRAIN PERFUSION: CPT

## 2025-07-23 PROCEDURE — 2500000003 HC RX 250 WO HCPCS: Performed by: INTERNAL MEDICINE

## 2025-07-23 PROCEDURE — 84484 ASSAY OF TROPONIN QUANT: CPT

## 2025-07-23 RX ORDER — IOPAMIDOL 755 MG/ML
50 INJECTION, SOLUTION INTRAVASCULAR
Status: COMPLETED | OUTPATIENT
Start: 2025-07-23 | End: 2025-07-23

## 2025-07-23 RX ORDER — ONDANSETRON 4 MG/1
4 TABLET, ORALLY DISINTEGRATING ORAL EVERY 8 HOURS PRN
Status: DISCONTINUED | OUTPATIENT
Start: 2025-07-23 | End: 2025-07-26 | Stop reason: HOSPADM

## 2025-07-23 RX ORDER — POLYETHYLENE GLYCOL 3350 17 G/17G
17 POWDER, FOR SOLUTION ORAL DAILY PRN
Status: DISCONTINUED | OUTPATIENT
Start: 2025-07-23 | End: 2025-07-26 | Stop reason: HOSPADM

## 2025-07-23 RX ORDER — ASPIRIN 81 MG/1
81 TABLET, CHEWABLE ORAL DAILY
Status: DISCONTINUED | OUTPATIENT
Start: 2025-07-23 | End: 2025-07-25

## 2025-07-23 RX ORDER — MIDAZOLAM HYDROCHLORIDE 2 MG/2ML
2 INJECTION, SOLUTION INTRAMUSCULAR; INTRAVENOUS ONCE
Status: DISCONTINUED | OUTPATIENT
Start: 2025-07-23 | End: 2025-07-26 | Stop reason: HOSPADM

## 2025-07-23 RX ORDER — PANTOPRAZOLE SODIUM 40 MG/1
40 TABLET, DELAYED RELEASE ORAL
Status: DISCONTINUED | OUTPATIENT
Start: 2025-07-24 | End: 2025-07-26 | Stop reason: HOSPADM

## 2025-07-23 RX ORDER — ASPIRIN 325 MG
325 TABLET ORAL
Status: COMPLETED | OUTPATIENT
Start: 2025-07-23 | End: 2025-07-23

## 2025-07-23 RX ORDER — LEVETIRACETAM 500 MG/5ML
2000 INJECTION, SOLUTION, CONCENTRATE INTRAVENOUS ONCE
Status: COMPLETED | OUTPATIENT
Start: 2025-07-23 | End: 2025-07-23

## 2025-07-23 RX ORDER — LEVETIRACETAM 500 MG/1
500 TABLET ORAL 2 TIMES DAILY
Status: DISCONTINUED | OUTPATIENT
Start: 2025-07-23 | End: 2025-07-26 | Stop reason: HOSPADM

## 2025-07-23 RX ORDER — FLUTICASONE PROPIONATE 50 MCG
2 SPRAY, SUSPENSION (ML) NASAL DAILY
Status: DISCONTINUED | OUTPATIENT
Start: 2025-07-24 | End: 2025-07-26 | Stop reason: HOSPADM

## 2025-07-23 RX ORDER — ROSUVASTATIN CALCIUM 40 MG/1
40 TABLET, COATED ORAL NIGHTLY
Status: DISCONTINUED | OUTPATIENT
Start: 2025-07-23 | End: 2025-07-25

## 2025-07-23 RX ORDER — IOPAMIDOL 755 MG/ML
100 INJECTION, SOLUTION INTRAVASCULAR
Status: COMPLETED | OUTPATIENT
Start: 2025-07-23 | End: 2025-07-23

## 2025-07-23 RX ORDER — LEVETIRACETAM 500 MG/5ML
2000 INJECTION, SOLUTION, CONCENTRATE INTRAVENOUS EVERY 12 HOURS
Status: DISCONTINUED | OUTPATIENT
Start: 2025-07-23 | End: 2025-07-23

## 2025-07-23 RX ORDER — SODIUM CHLORIDE 0.9 % (FLUSH) 0.9 %
5-40 SYRINGE (ML) INJECTION EVERY 12 HOURS SCHEDULED
Status: DISCONTINUED | OUTPATIENT
Start: 2025-07-23 | End: 2025-07-26 | Stop reason: HOSPADM

## 2025-07-23 RX ORDER — DOCUSATE SODIUM 100 MG/1
100 CAPSULE, LIQUID FILLED ORAL EVERY MORNING
Status: DISCONTINUED | OUTPATIENT
Start: 2025-07-24 | End: 2025-07-26 | Stop reason: HOSPADM

## 2025-07-23 RX ORDER — ONDANSETRON 2 MG/ML
4 INJECTION INTRAMUSCULAR; INTRAVENOUS EVERY 6 HOURS PRN
Status: DISCONTINUED | OUTPATIENT
Start: 2025-07-23 | End: 2025-07-26 | Stop reason: HOSPADM

## 2025-07-23 RX ORDER — 0.9 % SODIUM CHLORIDE 0.9 %
1000 INTRAVENOUS SOLUTION INTRAVENOUS ONCE
Status: COMPLETED | OUTPATIENT
Start: 2025-07-23 | End: 2025-07-23

## 2025-07-23 RX ORDER — HYDROCORTISONE ACETATE 25 MG/1
25 SUPPOSITORY RECTAL 2 TIMES DAILY
Status: DISCONTINUED | OUTPATIENT
Start: 2025-07-23 | End: 2025-07-23

## 2025-07-23 RX ORDER — ASPIRIN 300 MG/1
300 SUPPOSITORY RECTAL DAILY
Status: DISCONTINUED | OUTPATIENT
Start: 2025-07-23 | End: 2025-07-25

## 2025-07-23 RX ORDER — ALBUTEROL SULFATE 90 UG/1
2 INHALANT RESPIRATORY (INHALATION) EVERY 4 HOURS PRN
Status: DISCONTINUED | OUTPATIENT
Start: 2025-07-23 | End: 2025-07-23 | Stop reason: CLARIF

## 2025-07-23 RX ORDER — ACETAMINOPHEN 500 MG
500 TABLET ORAL EVERY 6 HOURS PRN
Status: DISCONTINUED | OUTPATIENT
Start: 2025-07-23 | End: 2025-07-26 | Stop reason: HOSPADM

## 2025-07-23 RX ORDER — ALBUTEROL SULFATE 0.83 MG/ML
2.5 SOLUTION RESPIRATORY (INHALATION) EVERY 4 HOURS PRN
Status: DISCONTINUED | OUTPATIENT
Start: 2025-07-23 | End: 2025-07-26 | Stop reason: HOSPADM

## 2025-07-23 RX ORDER — AZELASTINE HYDROCHLORIDE 0.5 MG/ML
1 SOLUTION/ DROPS OPHTHALMIC 2 TIMES DAILY
Status: DISCONTINUED | OUTPATIENT
Start: 2025-07-23 | End: 2025-07-23 | Stop reason: CLARIF

## 2025-07-23 RX ORDER — MULTIVITAMIN WITH IRON
1 TABLET ORAL DAILY
Status: DISCONTINUED | OUTPATIENT
Start: 2025-07-23 | End: 2025-07-26 | Stop reason: HOSPADM

## 2025-07-23 RX ORDER — CETIRIZINE HYDROCHLORIDE 10 MG/1
10 TABLET ORAL DAILY
Status: DISCONTINUED | OUTPATIENT
Start: 2025-07-23 | End: 2025-07-26 | Stop reason: HOSPADM

## 2025-07-23 RX ORDER — ENOXAPARIN SODIUM 100 MG/ML
40 INJECTION SUBCUTANEOUS DAILY
Status: DISCONTINUED | OUTPATIENT
Start: 2025-07-23 | End: 2025-07-26 | Stop reason: HOSPADM

## 2025-07-23 RX ORDER — LANOLIN ALCOHOL/MO/W.PET/CERES
400 CREAM (GRAM) TOPICAL DAILY
Status: DISCONTINUED | OUTPATIENT
Start: 2025-07-23 | End: 2025-07-26 | Stop reason: HOSPADM

## 2025-07-23 RX ORDER — BENZOCAINE/MENTHOL 6 MG-10 MG
LOZENGE MUCOUS MEMBRANE 2 TIMES DAILY
Status: DISCONTINUED | OUTPATIENT
Start: 2025-07-23 | End: 2025-07-23

## 2025-07-23 RX ORDER — FINASTERIDE 5 MG/1
5 TABLET, FILM COATED ORAL DAILY
Status: DISCONTINUED | OUTPATIENT
Start: 2025-07-23 | End: 2025-07-26 | Stop reason: HOSPADM

## 2025-07-23 RX ORDER — SODIUM CHLORIDE 0.9 % (FLUSH) 0.9 %
5-40 SYRINGE (ML) INJECTION PRN
Status: DISCONTINUED | OUTPATIENT
Start: 2025-07-23 | End: 2025-07-26 | Stop reason: HOSPADM

## 2025-07-23 RX ORDER — SODIUM CHLORIDE 9 MG/ML
INJECTION, SOLUTION INTRAVENOUS PRN
Status: DISCONTINUED | OUTPATIENT
Start: 2025-07-23 | End: 2025-07-26 | Stop reason: HOSPADM

## 2025-07-23 RX ORDER — BUPROPION HYDROCHLORIDE 300 MG/1
300 TABLET ORAL DAILY
Status: DISCONTINUED | OUTPATIENT
Start: 2025-07-23 | End: 2025-07-26 | Stop reason: HOSPADM

## 2025-07-23 RX ORDER — MONTELUKAST SODIUM 10 MG/1
10 TABLET ORAL NIGHTLY
Status: DISCONTINUED | OUTPATIENT
Start: 2025-07-23 | End: 2025-07-26 | Stop reason: HOSPADM

## 2025-07-23 RX ORDER — LOPERAMIDE HYDROCHLORIDE 2 MG/1
2 CAPSULE ORAL 3 TIMES DAILY PRN
Status: DISCONTINUED | OUTPATIENT
Start: 2025-07-23 | End: 2025-07-26 | Stop reason: HOSPADM

## 2025-07-23 RX ORDER — KETOTIFEN FUMARATE 0.35 MG/ML
1 SOLUTION/ DROPS OPHTHALMIC 2 TIMES DAILY
Status: DISCONTINUED | OUTPATIENT
Start: 2025-07-23 | End: 2025-07-26 | Stop reason: HOSPADM

## 2025-07-23 RX ADMIN — ENOXAPARIN SODIUM 40 MG: 100 INJECTION SUBCUTANEOUS at 16:21

## 2025-07-23 RX ADMIN — SODIUM CHLORIDE 1000 ML: 0.9 INJECTION, SOLUTION INTRAVENOUS at 11:23

## 2025-07-23 RX ADMIN — SODIUM CHLORIDE, PRESERVATIVE FREE 10 ML: 5 INJECTION INTRAVENOUS at 22:42

## 2025-07-23 RX ADMIN — LEVETIRACETAM 2000 MG: 100 INJECTION INTRAVENOUS at 11:19

## 2025-07-23 RX ADMIN — ROSUVASTATIN 40 MG: 40 TABLET, FILM COATED ORAL at 21:45

## 2025-07-23 RX ADMIN — MONTELUKAST 10 MG: 10 TABLET, FILM COATED ORAL at 21:45

## 2025-07-23 RX ADMIN — KETOTIFEN FUMARATE 1 DROP: 0.25 SOLUTION/ DROPS OPHTHALMIC at 21:45

## 2025-07-23 RX ADMIN — ASPIRIN 325 MG: 325 TABLET ORAL at 14:29

## 2025-07-23 RX ADMIN — IOPAMIDOL 50 ML: 755 INJECTION, SOLUTION INTRAVENOUS at 10:56

## 2025-07-23 RX ADMIN — LEVETIRACETAM 500 MG: 500 TABLET, FILM COATED ORAL at 21:45

## 2025-07-23 RX ADMIN — IOPAMIDOL 20 ML: 755 INJECTION, SOLUTION INTRAVENOUS at 11:04

## 2025-07-23 RX ADMIN — THERA TABS 1 TABLET: TAB at 16:15

## 2025-07-23 NOTE — PLAN OF CARE
Problem: Safety - Adult  Goal: Free from fall injury  Outcome: Progressing     Problem: Discharge Planning  Goal: Discharge to home or other facility with appropriate resources  Outcome: Progressing  Flowsheets (Taken 7/23/2025 1800)  Discharge to home or other facility with appropriate resources: Identify barriers to discharge with patient and caregiver     Problem: Pain  Goal: Verbalizes/displays adequate comfort level or baseline comfort level  Outcome: Progressing

## 2025-07-23 NOTE — H&P
[facility] Adult  Hospitalist Group  History and Physical    Date of Service:  7/23/2025  Primary Care Provider: Mustapha Estrada MD  Source of information: The patient, Chart review, and Friend/caregiver    Chief Complaint: Altered Mental Status      History of Presenting Illness:   Jules Bowie is a 70 y.o. male who presents with another episode of vertigo.  Patient stays in a group home setting due to mental health issues.  Reported to be mental retardation.  The  center, [facility] states the patient has intermittent episodes of unsteadiness dating back to several years.  Recently he called the facility nurse and reported the same.  On evaluation patient was found to have a low blood pressure reading.  Patient was sent to the emergency room after EMS was contacted.  No witnessed seizures.  At baseline he walks.  Unsteady at times.  No recent falls.  No fever no chills.Miesha at [id number] is the .  No head injuries.      Review of the emergency room records suggest patient came with unsteady gait, left-sided facial droop and not following commands.  Patient was not following verbal commands as per EMS statement.  On arrival EMS found his blood pressure to be 91 x 55.  He received a 200 mL of normal saline during transport.  On arrival to the emergency room blood sugar was 148, and was evaluated by teleneurology.  Blood pressure was 115 x 57 at the time of evaluation.  His NIHSS score was 22.  CT did not reveal any acute process.  CT angiogram was negative for any flow-limiting stenosis.  He was not a TNK candidate or mechanical thrombectomy candidate.  A EEG study was scheduled and started in the emergency room.      The patient denies any headache, blurry vision, sore throat, trouble swallowing, trouble with speech, chest pain, SOB, cough, fever, chills, N/V/D, abd pain, urinary symptoms, constipation, recent travels, sick contacts, focal or generalized neurological  does not use drugs.     Family and social history were personally reviewed, all pertinent and relevant details are outlined as above.    Objective:   BP (!) 115/57   Pulse 62   Temp 97.9 °F (36.6 °C) (Oral)   Resp 16   Ht 1.651 m (5' 5\")   Wt 63.8 kg (140 lb 10.5 oz)   SpO2 96%   BMI 23.41 kg/m²         PHYSICAL EXAM: Frail looking, drowsy, does not engage in sustained conversation, does not answer questions, awake, limited testing, otherwise normal and negative findings as below    General: Alert x oriented x 3, awake, no acute distress, resting in bed, pleasant male appears to be stated age  HEENT: PEERL, EOMI, moist mucus membranes  Neck: Supple, no JVD, no meningeal signs  Chest: Clear to auscultation bilaterally   CVS: RRR, S1 S2 heard, no murmurs/rubs/gallops  Abd: Soft, non-tender, non-distended, +bowel sounds   Ext: No clubbing, no cyanosis, no edema  Neuro/Psych: Pleasant mood and affect, CN 2-12 grossly intact, sensory grossly within normal limit, Strength 5/5 in all extremities, DTR 1+ x 4  Cap refill: Brisk, less than 3 seconds  Pulses: 2+, symmetric in all extremities  Skin: Warm, dry, without rashes or lesions    Data Review:   All diagnostic labs and studies have been reviewed.    Abnormal Labs Reviewed   CBC WITH AUTO DIFFERENTIAL - Abnormal; Notable for the following components:       Result Value    RBC 3.36 (*)     Hemoglobin 10.6 (*)     Hematocrit 32.7 (*)     Immature Granulocytes % 0.6 (*)     All other components within normal limits   PROTIME-INR - Abnormal; Notable for the following components:    Protime 11.4 (*)     All other components within normal limits   COMPREHENSIVE METABOLIC PANEL - Abnormal; Notable for the following components:    Chloride 109 (*)     Glucose 61 (*)     Calcium 8.1 (*)     Total Protein 5.9 (*)     Albumin 2.6 (*)     Albumin/Globulin Ratio 0.8 (*)     All other components within normal limits       [unfilled]    IMAGING:   XR CHEST PORTABLE   Final

## 2025-07-23 NOTE — ED NOTES
Headband: applied  Date/Time: 7/23/2025 12:00pm  Recorder: recording started  Skin: intact  Highest Seizure [place] Percentage past hour: 0      General info regarding Seizure [place] %:  Minimum duration of study is 2 hours. If Seizure [place] has remained 0% throughout the entire 2-hour duration, communicate with provider to stop the recording.     Seizure [place] 0-10% - Continue to monitor and complete 2-hour study.  Seizure [place] 11-89% - Epileptiform activity present. Notify provider for next steps.  Seizure [place] >/= 90% - Epileptiform activity consistent with Status Epilepticus. Immediately notify provider.     *Patients with Seizure [place] above 10% that persists may require a study longer than 2 hours. Maximum recording duration is 24 hours. Please update provider with a persistent increase in Seizure [place] above 10%.

## 2025-07-23 NOTE — ED TRIAGE NOTES
Pt arrives to ED via EMS with reports of unsteady gait, L sided facial droop, and not following commands. Per EMS the patient lives at a group home but goes to adult day care daily. According to EMS when the pt arrived to the adult day care he needed help getting off the bus due to his unsteady balance/gait and pt was not following verbal commands which is reportedly not his baseline. EMS found his initial BP to be 91/55, 200ml NS administered en route.    , no thinners (per chart and EMS), LTKW yesterday evening  Dr. Sibley assessing pt on arrival. Code Stroke Level 2 Van Positive called overhead.

## 2025-07-23 NOTE — ED NOTES
TRANSFER - OUT REPORT:    Verbal report given to KEVIN Wilburn on Jules Bowie  [facility]       Report consisted of patient's Situation, Background, Assessment and   Recommendations(SBAR).     Information from the following report(s) Nurse Handoff Report, Index, ED Encounter Summary, ED SBAR, Adult Overview, Surgery Report, MAR, Recent Results, Neuro Assessment, and Event Log was reviewed with the receiving nurse.    [place] Fall Assessment:    Presents to emergency department  because of falls (Syncope, seizure, or loss of consciousness): No  Age > 70: Yes  Altered Mental Status, Intoxication with alcohol or substance confusion (Disorientation, impaired judgment, poor safety awaremess, or inability to follow instructions): Yes  Impaired Mobility: Ambulates or transfers with assistive devices or assistance; Unable to ambulate or transer.: Yes  Nursing Judgement: Yes          Lines:   Peripheral IV 07/23/25 Left Antecubital (Active)        Opportunity for questions and clarification was provided.      Patient transported with:  Monitor and Registered Nurse

## 2025-07-23 NOTE — CONSULTS
NEUROLOGY CONSULT NOTE    Name Jules Bowie Age 70 y.o.   MRN [id number]  1955     Consulting Physician: Cheryl Patricia MD      Chief Complaint:  L facial droop, unsteady gait     Assessment:     Principal Problem:    Vertigo due to cerebrovascular disease  Resolved Problems:    * No resolved hospital problems. *    Patient is a 70 year-old male with history of seizures, vertigo, autism and MR who presented today from his group home/day activity with concern for L facial droop and unsteadiness. Patient is nonverbal and history provided is minimal. There is no facial droop currently or focal weakness; any weakness noted is symmetric. NIHSS 22 (much of scoring is patient's baseline). CT head negative. CTA no significant stenosis or occlusion. 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 Exam difficult to obtain given baseline functioning.     Possible stroke/TIA versus nonspecific symptoms  History of seizure  Recommendations:   - Obtain MRI brain  - Continue aspirin and statin for now  - Send A1c and lipid panel  - Obtain TTE  - Goal normotension/normoglycemia   - PT/OT consults  - Continue Keppra 500 mg BID      Will f/u when above testing is completed. Please contact with any questions.  History of Present Illness:      This is a 70 y.o. male with history of multiple episodes of peripheral vertigo, seizures, asthma, autism, MR, nonverbal, depression/anxiety, hypercholesterolemia, and s/p bilat maringotomy with tubes who presented to the ED from his facility today due to having concern for L facial droop and imbalance while at a day activity at the [employer]. There was also concern that patient had vertigo since he has had multiple recurrent episodes of peripheral vertigo in the past. At baseline, he does not communicate with words but is able to with gestures and nods. He is able to feed himself and he can walk without assistance, He lives at [facility] group home.

## 2025-07-23 NOTE — PROGRESS NOTES
Code Stroke Documentation      Symptoms:  Unsteady gate, left sided facial droop, not following commands, non-verbal at baseline   Baseline mRS:   3 - lives in group home, goes to adult day care by bus, assistance with ADLS and non-verbal at baseline with hx of seizures   Last Known Well:  07/23/25 LKWT not known, but is thought to have been at baseline at bedtime in group home last night.   Medical hx: Past Medical History:   Diagnosis Date    Asthma     PT ON DAILY INHALERS AND HAS A RESCUE INHALER    Ill-defined condition     MILD MR, AUTISM    Other ill-defined conditions(799.89)     elevated cholesterol    Other ill-defined conditions(799.89)     nonverbal,uses sign language some,mental retardation    Psychiatric disorder     MR,depression anxiety      Vitals: Vitals:    07/23/25 1145   BP: (!) 115/57   Pulse: 62   Resp: 16   Temp:    SpO2: 96%      AC/APT:  None   VAN: Positive   NIHSS: 1a-LOC:0  1b-Month/Age:2  1c-Open/Close Hand:2  2-Best Gaze:0  3-Visual Fields:0  4-Facial Palsy:0 - symmetric upon activation  5a-Left Arm:3  5b-Right Arm:3  6a-Left Leg:3  6b-Right Leg:3  7-Limb Ataxia:0  8-Sensory:2  9-Best Language:3 - mute at baseline  10-Dysarthria:0  11-Extinction/Inattention:1  TOTAL SCORE:22   Imaging (personally reviewed): CT: No acute process  CTA/CTP: No LVO or flow limiting stenosis     Plan: tNK Candidate: NO  Mechanical thrombectomy Candidate: NO    *Perform dysphagia screening prior to any PO intake*     Discussed with: Dr. Sibley, patient, primary nurse, Dr. Soriano - Teleneuro    Arrival time: 10:38 am - brought teleneuro to CT, verified patient condition and spoke with primary nurse.  I left to see another patient in angio prior to procedure and returned to finish assessing patient and imaging.    I have spent 45 of critical care time involved in lab review, consultations with specialist, family decision making and documentation. During this entire length of time I was immediately available to

## 2025-07-23 NOTE — ED PROVIDER NOTES
[facility] 6S NEURO-SCI TELE  EMERGENCY DEPARTMENT ENCOUNTER      Pt Name: Jules Bowie  MRN: [id number]  Birthdate 1955  Date of evaluation: 7/23/2025  Provider: Jay Jay Sibley MD    CHIEF COMPLAINT       Chief Complaint   Patient presents with    Altered Mental Status         HISTORY OF PRESENT ILLNESS   (Location/Symptom, Timing/Onset, Context/Setting, Quality, Duration, Modifying Factors, Severity)  Note limiting factors.   70M w/ hx BPH, asthma, HTN, seizures, cognitive impairment p/w change in MS. Pt lives in a group home and was being transported to his adult  facility. Upon arrival, staff noticed that he had a left sided facial droop and wasn't speaking. Normally is minimally verbal but today wasn't following commands. Staff also noticed that he was very unstable/unsteady while walking and nearly fell over. Also noted to be hypotensive for EMS 90/50 for which gave IVF. NO reported falls/trauma. Very limited hx. No reported F/C, vomiting, diarrhea or recent illnesses. Per EMR, on valbenazine and keppra. NO anticoagulation. NO known hx of cva.            Review of External Medical Records:     Nursing Notes were reviewed.    REVIEW OF SYSTEMS    (2-9 systems for level 4, 10 or more for level 5)     Review of Systems   Unable to perform ROS: Acuity of condition       Except as noted above the remainder of the review of systems was reviewed and negative.       PAST MEDICAL HISTORY     Past Medical History:   Diagnosis Date    Asthma     PT ON DAILY INHALERS AND HAS A RESCUE INHALER    Ill-defined condition     MILD MR, AUTISM    Other ill-defined conditions(799.89)     elevated cholesterol    Other ill-defined conditions(799.89)     nonverbal,uses sign language some,mental retardation    Psychiatric disorder     MR,depression anxiety         SURGICAL HISTORY       Past Surgical History:   Procedure Laterality Date    COLONOSCOPY N/A 5/18/2023    COLONOSCOPY, EGD performed by Danny Humphrey MD at [facility]  IMPRESSION      1. Stroke-like symptoms    2. Seizure (HCC)    3. Carotid aneurysm, left    4. Cerebrovascular accident (CVA), unspecified mechanism (HCC)          DISPOSITION/PLAN   DISPOSITION Admitted 07/23/2025 02:48:09 PM      PATIENT REFERRED TO:  Mustapha Estrada MD  [address] [id number] [phone]    Schedule an appointment as soon as possible for a visit in 30 day(s)  Please make an appointment with your primary doctor within 4 weeks of leaving the hospital. If you don’t have a primary doctor, please find one and see them within the next 4 weeks.      DISCHARGE MEDICATIONS:  Current Discharge Medication List            Jay Jay Sibley MD (electronically signed)  Emergency Attending Physician       Perfect Serve Consult for Admission  7:01 AM    ED Room Number: 657/01  Patient Name and age:  Jules Bowie 70 y.o.  male  [id number]  Working Diagnosis:   1. Stroke-like symptoms    2. Seizure (HCC)    3. Carotid aneurysm, left    4. Cerebrovascular accident (CVA), unspecified mechanism (HCC)        Department: [facility] Adult ED - [phone]  Recommended Level of Care: telemetry  Readmission: No    Other:         Jay Jay Sibley MD  07/25/25 0701

## 2025-07-23 NOTE — CARE COORDINATION
4:03 PM    Care Management Initial Assessment       RUR: N/A  Readmission? No  1st IM letter given? No  1st  letter given: No    CM met with patient and patient's Staff Worker at [facility], Maria Luisa Tong [phone] at bedside to introduce self and role. Patient is a resident at [facility] located at [address] [place] [address] [address] [address] [phone].  Patient's sister is his Legal Guardian.  ACP on file.  At baseline patient is non-verbal and communicates via hand gestures or head nods.  He is able to feed and dress himself.  Patient with a medical history significant for BPH, asthma, HTN, seizures, cognitive impairment (MR).  Patient currently admitted for Vertigo due to cerebrovascular disease.    ADLs: Indepedent  DME: None  PCP follow up: Mustapha Estrada MD   Previous Home Health: None  Previous Skilled Nursing Facility: None  Previous Inpatient Rehab: None  Insurance verified: Medicare A&B/ [facility]  Pharmacy: [facility]  Emergency Contact: Sister (Legal Guardian): Miesha Bowie [phone], Staff Person at [facility], Gabrielle Dykes [phone], and  of [facility] [phone].    Discharge will need to be coordinated with staff at [facility].  [facility] staff will be able to assist with transport once patient is medically stable for discharge.  CM will follow patient progress and assist as needed with JASMIN plan.     07/23/25 3014   Service Assessment   Patient Orientation Unable to Assess  (Non verbal at baseline-MR)   Cognition Alert  (Non-verbal at baseling)   History Provided By Other (see comment);Child/Family  (Staff Worker: Maria Luisa Tong [phone])   Primary Caregiver Private caregiver  ([facility])   Accompanied By/Relationship Staff Worker: Maria Luisa Tong [phone]   Support Systems Family Members;Other (Comment)  (Group Home Workers)   Patient's Healthcare Decision Maker is: Named

## 2025-07-24 ENCOUNTER — APPOINTMENT (OUTPATIENT)
Facility: HOSPITAL | Age: 70
DRG: 149 | End: 2025-07-24
Payer: MEDICARE

## 2025-07-24 PROBLEM — R29.90 STROKE-LIKE SYMPTOM: Status: ACTIVE | Noted: 2025-07-24

## 2025-07-24 PROBLEM — R29.90 STROKE-LIKE SYMPTOMS: Status: ACTIVE | Noted: 2025-07-24

## 2025-07-24 PROBLEM — R09.89 OTHER SPECIFIED SYMPTOMS AND SIGNS INVOLVING THE CIRCULATORY AND RESPIRATORY SYSTEMS: Status: ACTIVE | Noted: 2025-07-24

## 2025-07-24 PROBLEM — R26.81 UNSTEADINESS ON FEET: Status: ACTIVE | Noted: 2019-10-25

## 2025-07-24 LAB
ANION GAP SERPL CALC-SCNC: 4 MMOL/L (ref 2–12)
BUN SERPL-MCNC: 13 MG/DL (ref 6–20)
BUN/CREAT SERPL: 13 (ref 12–20)
CALCIUM SERPL-MCNC: 8.7 MG/DL (ref 8.5–10.1)
CHLORIDE SERPL-SCNC: 110 MMOL/L (ref 97–108)
CHOLEST SERPL-MCNC: 92 MG/DL
CO2 SERPL-SCNC: 29 MMOL/L (ref 21–32)
CREAT SERPL-MCNC: 1.02 MG/DL (ref 0.7–1.3)
ECHO AO ROOT DIAM: 3.2 CM
ECHO AO ROOT INDEX: 1.94 CM/M2
ECHO AV AREA PEAK VELOCITY: 1.9 CM2
ECHO AV AREA VTI: 1.6 CM2
ECHO AV AREA/BSA PEAK VELOCITY: 1.2 CM2/M2
ECHO AV AREA/BSA VTI: 1 CM2/M2
ECHO AV MEAN GRADIENT: 6 MMHG
ECHO AV MEAN VELOCITY: 1.2 M/S
ECHO AV PEAK GRADIENT: 12 MMHG
ECHO AV PEAK VELOCITY: 1.7 M/S
ECHO AV VELOCITY RATIO: 0.82
ECHO AV VTI: 33.1 CM
ECHO BSA: 1.71 M2
ECHO EST RA PRESSURE: 3 MMHG
ECHO LA DIAMETER INDEX: 1.82 CM/M2
ECHO LA DIAMETER: 3 CM
ECHO LA TO AORTIC ROOT RATIO: 0.94
ECHO LA VOL A-L A2C: 53 ML (ref 18–58)
ECHO LA VOL A-L A4C: 43 ML (ref 18–58)
ECHO LA VOL MOD A2C: 51 ML (ref 18–58)
ECHO LA VOL MOD A4C: 37 ML (ref 18–58)
ECHO LA VOLUME AREA LENGTH: 50 ML
ECHO LA VOLUME INDEX A-L A2C: 32 ML/M2 (ref 16–34)
ECHO LA VOLUME INDEX A-L A4C: 26 ML/M2 (ref 16–34)
ECHO LA VOLUME INDEX AREA LENGTH: 30 ML/M2 (ref 16–34)
ECHO LA VOLUME INDEX MOD A2C: 31 ML/M2 (ref 16–34)
ECHO LA VOLUME INDEX MOD A4C: 22 ML/M2 (ref 16–34)
ECHO LV E' LATERAL VELOCITY: 13.93 CM/S
ECHO LV E' SEPTAL VELOCITY: 9.01 CM/S
ECHO LV EF PHYSICIAN: 55 %
ECHO LVOT AREA: 2.5 CM2
ECHO LVOT AV VTI INDEX: 0.65
ECHO LVOT DIAM: 1.8 CM
ECHO LVOT MEAN GRADIENT: 3 MMHG
ECHO LVOT PEAK GRADIENT: 7 MMHG
ECHO LVOT PEAK VELOCITY: 1.4 M/S
ECHO LVOT STROKE VOLUME INDEX: 33 ML/M2
ECHO LVOT SV: 54.4 ML
ECHO LVOT VTI: 21.4 CM
ECHO MV A VELOCITY: 0.76 M/S
ECHO MV AREA PHT: 4.1 CM2
ECHO MV AREA VTI: 3.1 CM2
ECHO MV E DECELERATION TIME (DT): 186.5 MS
ECHO MV E VELOCITY: 0.55 M/S
ECHO MV E/A RATIO: 0.72
ECHO MV E/E' LATERAL: 3.95
ECHO MV E/E' RATIO (AVERAGED): 5.03
ECHO MV E/E' SEPTAL: 6.1
ECHO MV LVOT VTI INDEX: 0.83
ECHO MV MAX VELOCITY: 0.8 M/S
ECHO MV MEAN GRADIENT: 1 MMHG
ECHO MV MEAN VELOCITY: 0.3 M/S
ECHO MV PEAK GRADIENT: 3 MMHG
ECHO MV PRESSURE HALF TIME (PHT): 54.1 MS
ECHO MV VTI: 17.8 CM
ECHO RIGHT VENTRICULAR SYSTOLIC PRESSURE (RVSP): 29 MMHG
ECHO RV FREE WALL PEAK S': 12.2 CM/S
ECHO RV TAPSE: 1.9 CM (ref 1.7–?)
ECHO TV REGURGITANT MAX VELOCITY: 2.56 M/S
ECHO TV REGURGITANT PEAK GRADIENT: 26 MMHG
ERYTHROCYTE [DISTWIDTH] IN BLOOD BY AUTOMATED COUNT: 12.1 % (ref 11.5–14.5)
EST. AVERAGE GLUCOSE BLD GHB EST-MCNC: 105 MG/DL
GLUCOSE SERPL-MCNC: 74 MG/DL (ref 65–100)
HBA1C MFR BLD: 5.3 % (ref 4–5.6)
HCT VFR BLD AUTO: 34.7 % (ref 36.6–50.3)
HDLC SERPL-MCNC: 39 MG/DL
HDLC SERPL: 2.4 (ref 0–5)
HGB BLD-MCNC: 11.3 G/DL (ref 12.1–17)
LDLC SERPL CALC-MCNC: 36.8 MG/DL (ref 0–100)
MCH RBC QN AUTO: 31.5 PG (ref 26–34)
MCHC RBC AUTO-ENTMCNC: 32.6 G/DL (ref 30–36.5)
MCV RBC AUTO: 96.7 FL (ref 80–99)
NRBC # BLD: 0 K/UL (ref 0–0.01)
NRBC BLD-RTO: 0 PER 100 WBC
PLATELET # BLD AUTO: 265 K/UL (ref 150–400)
PMV BLD AUTO: 10.1 FL (ref 8.9–12.9)
POTASSIUM SERPL-SCNC: 3.9 MMOL/L (ref 3.5–5.1)
RBC # BLD AUTO: 3.59 M/UL (ref 4.1–5.7)
SODIUM SERPL-SCNC: 143 MMOL/L (ref 136–145)
TRIGL SERPL-MCNC: 81 MG/DL
VLDLC SERPL CALC-MCNC: 16.2 MG/DL
WBC # BLD AUTO: 5.1 K/UL (ref 4.1–11.1)

## 2025-07-24 PROCEDURE — 83036 HEMOGLOBIN GLYCOSYLATED A1C: CPT

## 2025-07-24 PROCEDURE — 85027 COMPLETE CBC AUTOMATED: CPT

## 2025-07-24 PROCEDURE — 97161 PT EVAL LOW COMPLEX 20 MIN: CPT

## 2025-07-24 PROCEDURE — 6360000002 HC RX W HCPCS: Performed by: INTERNAL MEDICINE

## 2025-07-24 PROCEDURE — 96372 THER/PROPH/DIAG INJ SC/IM: CPT

## 2025-07-24 PROCEDURE — 6370000000 HC RX 637 (ALT 250 FOR IP): Performed by: INTERNAL MEDICINE

## 2025-07-24 PROCEDURE — 2060000000 HC ICU INTERMEDIATE R&B

## 2025-07-24 PROCEDURE — G0378 HOSPITAL OBSERVATION PER HR: HCPCS

## 2025-07-24 PROCEDURE — 80061 LIPID PANEL: CPT

## 2025-07-24 PROCEDURE — 97530 THERAPEUTIC ACTIVITIES: CPT

## 2025-07-24 PROCEDURE — 2500000003 HC RX 250 WO HCPCS: Performed by: INTERNAL MEDICINE

## 2025-07-24 PROCEDURE — 93306 TTE W/DOPPLER COMPLETE: CPT

## 2025-07-24 PROCEDURE — 97165 OT EVAL LOW COMPLEX 30 MIN: CPT

## 2025-07-24 PROCEDURE — 80048 BASIC METABOLIC PNL TOTAL CA: CPT

## 2025-07-24 PROCEDURE — 93306 TTE W/DOPPLER COMPLETE: CPT | Performed by: SPECIALIST

## 2025-07-24 PROCEDURE — 92610 EVALUATE SWALLOWING FUNCTION: CPT

## 2025-07-24 PROCEDURE — 92523 SPEECH SOUND LANG COMPREHEN: CPT

## 2025-07-24 PROCEDURE — 1100000003 HC PRIVATE W/ TELEMETRY

## 2025-07-24 PROCEDURE — 99232 SBSQ HOSP IP/OBS MODERATE 35: CPT | Performed by: NURSE PRACTITIONER

## 2025-07-24 RX ORDER — VALBENAZINE 60 MG/1
60 CAPSULE ORAL DAILY
COMMUNITY

## 2025-07-24 RX ORDER — POLYETHYLENE GLYCOL 3350 17 G/17G
17 POWDER, FOR SOLUTION ORAL DAILY PRN
COMMUNITY

## 2025-07-24 RX ORDER — ATORVASTATIN CALCIUM 10 MG/1
10 TABLET, FILM COATED ORAL
COMMUNITY

## 2025-07-24 RX ORDER — SENNOSIDES 8.6 MG/1
1 TABLET ORAL
COMMUNITY

## 2025-07-24 RX ADMIN — KETOTIFEN FUMARATE 1 DROP: 0.25 SOLUTION/ DROPS OPHTHALMIC at 08:02

## 2025-07-24 RX ADMIN — PANTOPRAZOLE SODIUM 40 MG: 40 TABLET, DELAYED RELEASE ORAL at 06:45

## 2025-07-24 RX ADMIN — SODIUM CHLORIDE, PRESERVATIVE FREE 10 ML: 5 INJECTION INTRAVENOUS at 08:02

## 2025-07-24 RX ADMIN — CETIRIZINE HYDROCHLORIDE 10 MG: 10 TABLET, FILM COATED ORAL at 08:01

## 2025-07-24 RX ADMIN — THERA TABS 1 TABLET: TAB at 08:01

## 2025-07-24 RX ADMIN — Medication 400 MG: at 08:01

## 2025-07-24 RX ADMIN — BUPROPION HYDROCHLORIDE 300 MG: 300 TABLET, EXTENDED RELEASE ORAL at 10:48

## 2025-07-24 RX ADMIN — ACETAMINOPHEN 500 MG: 500 TABLET ORAL at 23:18

## 2025-07-24 RX ADMIN — FLUTICASONE PROPIONATE 2 SPRAY: 50 SPRAY, METERED NASAL at 10:48

## 2025-07-24 RX ADMIN — SODIUM CHLORIDE, PRESERVATIVE FREE 10 ML: 5 INJECTION INTRAVENOUS at 22:31

## 2025-07-24 RX ADMIN — LEVETIRACETAM 500 MG: 500 TABLET, FILM COATED ORAL at 08:01

## 2025-07-24 RX ADMIN — ENOXAPARIN SODIUM 40 MG: 100 INJECTION SUBCUTANEOUS at 08:02

## 2025-07-24 RX ADMIN — DOCUSATE SODIUM 100 MG: 100 CAPSULE, LIQUID FILLED ORAL at 08:01

## 2025-07-24 RX ADMIN — KETOTIFEN FUMARATE 1 DROP: 0.25 SOLUTION/ DROPS OPHTHALMIC at 22:31

## 2025-07-24 RX ADMIN — LEVETIRACETAM 500 MG: 500 TABLET, FILM COATED ORAL at 22:31

## 2025-07-24 RX ADMIN — FLUOXETINE HYDROCHLORIDE 20 MG: 20 CAPSULE ORAL at 08:01

## 2025-07-24 RX ADMIN — MONTELUKAST 10 MG: 10 TABLET, FILM COATED ORAL at 22:31

## 2025-07-24 RX ADMIN — ROSUVASTATIN 40 MG: 40 TABLET, FILM COATED ORAL at 22:31

## 2025-07-24 RX ADMIN — ASPIRIN 81 MG: 81 TABLET, CHEWABLE ORAL at 08:01

## 2025-07-24 RX ADMIN — FINASTERIDE 5 MG: 5 TABLET, FILM COATED ORAL at 08:01

## 2025-07-24 NOTE — PROGRESS NOTES
PHYSICAL THERAPY EVALUATION/DISCHARGE    Patient: Jules Bowie (70 y.o. male)  Date: 7/24/2025  Primary Diagnosis: Seizure (HCC) [R56.9]  Vertigo due to cerebrovascular disease [I67.9, R42]  Carotid aneurysm, left [I72.0]  Stroke-like symptoms [R29.90]  Stroke-like symptom [R29.90]       Precautions:              ASSESSMENT AND RECOMMENDATIONS: Patient is a 70 year-old male with history of seizures, vertigo, autism and MR who presented today from his group home/day activity with concern for L facial droop and unsteadiness.   Based on the objective data below, the patient demonstrated safe transfers and gait. Patient with some trunk sway, some increased weight bearing on supinated foot during ambulation - but appears to be at his functional baseline. Patient able to stand while reaching across midline without issue.   Patient nods yes to all questions from therapist   07/24/25 1115   Vital Signs   Orthostatic B/P and Pulse? Yes   Blood Pressure Lying 120/61   Pulse Lying 67 PER MINUTE   Blood Pressure Sitting 123/62   Pulse Sitting 68 PER MINUTE   Blood Pressure Standing 120/67   Pulse Standing 67 PER MINUTE       Functional Outcome Measure:  The patient scored 20/24 on the [facility] outcome measure   Cutoff score <=171,2,3 had higher odds of discharging home with home health or need of SNF/IPR  Further skilled acute physical therapy is not indicated at this time.       PLAN :  Recommendation for discharge: (in order for the patient to meet his/her long term goals):   No skilled physical therapy    Other factors to consider for discharge: impaired cognition    IF patient discharges home will need the following DME: none       SUBJECTIVE:   Patient nodding yes to all questions    OBJECTIVE DATA SUMMARY:     Past Medical History:   Diagnosis Date    Asthma     PT ON DAILY INHALERS AND HAS A RESCUE INHALER    Ill-defined condition     MILD MR, AUTISM    Other ill-defined conditions(689.89)     elevated cholesterol    Other

## 2025-07-24 NOTE — PROGRESS NOTES
Speech LAnguage Pathology EVALUATION/DISCHARGE    Patient: Jules Bowie (70 y.o. male)  Date: 7/24/2025  Primary Diagnosis: Seizure (HCC) [R56.9]  Vertigo due to cerebrovascular disease [I67.9, R42]  Carotid aneurysm, left [I72.0]  Stroke-like symptoms [R29.90]  Stroke-like symptom [R29.90]       Precautions:  Fall Risk                  ASSESSMENT :  Patient seen for swallow and communication evaluations. Patient utilizing sign language to talk with SLP, and immediately used the ASL sign for toilet when SLP entered the room. Attempted to determine if patient utilized any other sign language but was unable to have patient perform any signs cued (only spontaneously). Therefore, provided to staff information (and provided below) on commonly used signs so that they could be understand what the patient is communicating. Additionally, patient seen with regular diet thin liquid tray. No overt difficulty nor overt s/s of aspiration present with any consistency. No further acute SLP needs anticipated. Will sign off. Please reocnsult should SLP be of any assistance.     Patient will be discharged from skilled speech-language pathology services at this time.     PLAN :  Recommendations and Planned Interventions:  Diet: Regular diet/thin liquids    For staff interpretation purposes:              Acute SLP Services: No, patient will be discharged from acute skilled speech-language pathology at this time.  Discharge Recommendations: No, additional SLP treatment not indicated at discharge     SUBJECTIVE:   Patient signed toilet.    OBJECTIVE:     Past Medical History:   Diagnosis Date    Asthma     PT ON DAILY INHALERS AND HAS A RESCUE INHALER    Ill-defined condition     AUTISM    Other ill-defined conditions(799.89)     elevated cholesterol    Other ill-defined conditions(799.89)     nonverbal,uses sign language some    Psychiatric disorder     depression anxiety   **Please note that MR/mental retardation are no longer  fed    Oral Phase: Functional  Bolus Acceptance: No impairment  Bolus Formation and Control: No impairment  Type of Impairment: N/A  Propulsion: No impairment  Oral Residue: Absent    Pharyngeal Phase: Subjectively WFL  Initiation of Swallow: Present  Laryngeal Elevation: Present  Aspiration Signs/Symptoms: None  Other Pharyngeal Observations: None    Overall Bedside Presentation: Subjectively WFL                Respiratory Status/Airway:  Room air                         Outcome Measure:  Functional Oral Intake Scale (FOIS): 7--Total oral diet with no restrictions    After treatment:   Call bell left within reach and Nursing notified    COMMUNICATION/EDUCATION:     The patient's plan of care including recommendations, planned interventions, and recommended diet changes were discussed with: Registered nurse    Patient is unable to participate in goal setting and plan of care    Katiuska Cerda, Ph.D., CCC-SLP (pronouns: she/her/hers)  Speech-Language Pathologist

## 2025-07-24 NOTE — PROGRESS NOTES
NEUROLOGY PROGRESS NOTE    Name Jules Bowie Age 70 y.o.   MRN [id number]  1955     Consulting Physician: Darin Darnell MD      Chief Complaint:  L facial droop, unsteady gait     Assessment:     Principal Problem:    Vertigo due to cerebrovascular disease  Active Problems:    Unsteadiness on feet    Stroke-like symptoms    Other specified symptoms and signs involving the circulatory and respiratory systems    Stroke-like symptom  Resolved Problems:    * No resolved hospital problems. *    Patient is a 70 year-old male with history of seizures, vertigo, autism and MR who presented today from his group home/day activity with concern for L facial droop and unsteadiness. Patient is nonverbal and history provided is minimal. There is no facial droop currently or focal weakness; any weakness noted is symmetric. NIHSS 22 (much of scoring is patient's baseline). CT head negative. CTA no significant stenosis or occlusion. 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 Exam difficult to obtain given baseline functioning.     25:  Exam improved to baseline and confirmed by Maria Luisa from facility at bedside. MRI brain still pending. LDL 36.8, A1c 5.3. TTE EF 55%, LA normal. Less concern for seizure with presentation.       Possible stroke/TIA versus nonspecific symptoms  History of seizure  Recommendations:   - Obtain MRI brain  - Continue aspirin and statin for now  - Goal normotension/normoglycemia   - PT/OT following and no recommendations  - Continue Keppra 500 mg BID      Will f/u when above testing is completed. Please contact with any questions.  History of Present Illness:      This is a 70 y.o. male with history of multiple episodes of peripheral vertigo, seizures, asthma, autism, MR, nonverbal, depression/anxiety, hypercholesterolemia, and s/p bilat maringotomy with tubes who presented to the ED from his facility today due to having concern for L facial droop and imbalance while

## 2025-07-24 NOTE — CARE COORDINATION
Transition of Care Plan:    Return to Group Home when stable (PENDING MRI)  [facility] [address], [place] [address]     Staff Worker: Maria Luisa Tong [phone]     Transport: group home    RUR:  n/a OBS  Prior Level of Functioning:  needs assist w/ most ADLs at his baseline  Disposition:  return to group home   FAISAL: 7/24  Follow up appointments: PCP  DME needed: none   Transportation at discharge: group home confirmed  IM/IMM Medicare/ letter given: no obs  Is patient a  and connected with VA? no  Caregiver Contact:   Sister (Legal Guardian): Miesha Bowie [phone], Staff Person at half-way, Gabrielle Dykes [phone], and  of [facility] [phone]  Discharge Caregiver contacted prior to discharge? yes  Care Conference needed? no  Barriers to discharge: none - dc    11am: Per Attending, Pt awaiting MRI then will return to group home when stable. PT OT have assessed Pt and cleared to return to group home.     Nancy Cleveland, DESHAWNW

## 2025-07-24 NOTE — PROGRESS NOTES
Admission Medication Reconciliation:    Information obtained from: [facility] medication list updated with information provided by Alireza Garcia RD dated July 2025.    Summary:     Medications added: atorvastatin, polyethylene glycol, senna   Medications deleted: hydrocortisone suppository,   Doses changed: NA    Inpatient orders have been reviewed and no changes are needed.     Allergies:  Patient has no known allergies.    Significant PMH/Disease States:   Past Medical History:   Diagnosis Date    Asthma     PT ON DAILY INHALERS AND HAS A RESCUE INHALER    Ill-defined condition     MILD MR, AUTISM    Other ill-defined conditions(799.89)     elevated cholesterol    Other ill-defined conditions(799.89)     nonverbal,uses sign language some,mental retardation    Psychiatric disorder     MR,depression anxiety     Chief Complaint for this Admission:    Chief Complaint   Patient presents with    Altered Mental Status     Prior to Admission Medications:   Prior to Admission Medications   Prescriptions Last Dose Informant   ANTI-DIARRHEAL 2 MG tablet Past Month    Sig: TAKE (1) TABLET BY MOUTH 4 TIMES A DAY AS NEEDED FOR DIARRHEA   Benzocaine-Menthol (CEPACOL EXTRA STRENGTH) 15-2.6 MG LOZG lozenge Past Month    Sig: Take 1 lozenge by mouth every 2 hours as needed for Sore Throat   FLUoxetine (PROZAC) 20 MG capsule Past Month    Sig: Take 1 capsule by mouth daily   Multiple Vitamin (MULTIVITAMIN) tablet Past Month    Sig: TAKE 1 TABLET BY MOUTH DAILY   Valbenazine Tosylate (INGREZZA) 60 MG CAPS Past Month    Sig: Take 60 mg by mouth daily   acetaminophen (TYLENOL) 500 MG tablet Past Month    Sig: Take 1 tablet by mouth every 6 hours as needed for Pain or Fever   albuterol sulfate HFA (PROVENTIL;VENTOLIN;PROAIR) 108 (90 Base) MCG/ACT inhaler Past Month    Sig: Inhale 2 puffs into the lungs every 4 hours as needed   atorvastatin (LIPITOR) 10 MG tablet Past Month    Sig: Take 1 tablet by mouth nightly   azelastine (OPTIVAR) 0.05 %

## 2025-07-24 NOTE — PLAN OF CARE
Encourage patient to monitor pain and request assistance  7/23/2025 1909 by Marylin Ren, RN  Outcome: Progressing     Problem: Skin/Tissue Integrity  Goal: Skin integrity remains intact  Description: 1.  Monitor for areas of redness and/or skin breakdown  2.  Assess vascular access sites hourly  3.  Every 4-6 hours minimum:  Change oxygen saturation probe site  4.  Every 4-6 hours:  If on nasal continuous positive airway pressure, respiratory therapy assess nares and determine need for appliance change or resting period  Outcome: Progressing  Flowsheets  Taken 7/24/2025 0800 by Wendy Laguerre, RN  Skin Integrity Remains Intact:   Monitor for areas of redness and/or skin breakdown   Assess vascular access sites hourly   Every 4-6 hours minimum:  Change oxygen saturation probe site  Taken 7/23/2025 2000 by Severson, Constance M, RN  Skin Integrity Remains Intact: Monitor for areas of redness and/or skin breakdown

## 2025-07-24 NOTE — PROGRESS NOTES
Pt has a piece of paper in chart (called Appointment Notes/Physician's Order) defining needs/communication techniques but states as follows:    ~Jules communicates by using gestures, nodding his head up and down \"yes\", and sideways \"no\". Can ask for what he wants by pointing or using sign language.  ~Eats his food independently.  ~Uses the restroom on his own after gesturing a sign for bathroom\". Staff need to monitor him while using the restroom due to his behavior of storing urine in containers.  ~Due to vertigo, staff ensure to supervise him when getting in the tub and ambulating on stairs.  ~NO KNOWN ALLERGIES.  ~Missing teeth prone to choking.        0800: This RN informed that pt is unable to verbalize hx r/t MRI form.  1004: Called Gabrielle (works at [facility]) to assist with medication questions related to pts meds. All questions answered + then verified with pharmacy. Also asked Gabrielle if pts sister, Miesha would be the best contact to fill out MRI screening form as this needs to be completed.  1008: Attempted to call Miesha to complete MRI form. No answer. Will try again.  1250: Called Miesha and spoke with her and updated her on need for MRI form to be completed. Miesha advised this RN that Lc Hinton, manager of [facility] would be the better contact for this.  1300: Spoke with Lc Hinton in regards to obtaining information needed for MRI screening form. Second RNAnia at nurses station to verify.  1301: MRI form faxed to MRI.

## 2025-07-24 NOTE — PROGRESS NOTES
Hospitalist Progress Note  SABRINA Rivas NP  Answering service: [phone] OR [phone] from in house phone        Date of Service:  2025  NAME:  Jules Bowie  :  1955  MRN:  [id number]      Admission Summary:   Per H&P     Jules Bowie is a 70 y.o. male who presents with another episode of vertigo.  Patient stays in a group home setting due to mental health issues.  Reported to be mental retardation.  The  center, formerly [facility] states the patient has intermittent episodes of unsteadiness dating back to several years.  Recently he called the facility nurse and reported the same.  On evaluation patient was found to have a low blood pressure reading.  Patient was sent to the emergency room after EMS was contacted.  No witnessed seizures.  At baseline he walks.  Unsteady at times.  No recent falls.  No fever no chills.Miesha at [phone] is the .  No head injuries.        Review of the emergency room records suggest patient came with unsteady gait, left-sided facial droop and not following commands.  Patient was not following verbal commands as per EMS statement.  On arrival EMS found his blood pressure to be 91 x 55.  He received a 200 mL of normal saline during transport.  On arrival to the emergency room blood sugar was 148, and was evaluated by teleneurology.  Blood pressure was 115 x 57 at the time of evaluation.  His NIHSS score was 22.  CT did not reveal any acute process.  CT angiogram was negative for any flow-limiting stenosis.  He was not a TNK candidate or mechanical thrombectomy candidate.  A EEG study was scheduled and started in the emergency room.        The patient denies any headache, blurry vision, sore throat, trouble swallowing, trouble with speech, chest pain, SOB, cough, fever, chills, N/V/D, abd pain, urinary symptoms, constipation, recent travels, sick contacts,

## 2025-07-24 NOTE — PROGRESS NOTES
OCCUPATIONAL THERAPY EVALUATION/DISCHARGE  Patient: Jules Bowie (70 y.o. 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 Based on the objective data below and chart review prior to seeing patient, the patient is functioning near his baseline and further skilled acute occupational therapy is not indicated at this time. Pt is currently Supervision level for functional mobility and ADLs without AD. Pt completed grooming tasks standing at sink and donned pull up brief with (S). Per nursing staff, pt has been using BS for toileting with (S). Pt left sitting up in chair at end of session with needs met, call light in reach and chair alarm on.       07/24/25 1115   Vital Signs   Orthostatic B/P and Pulse? Yes   Blood Pressure Lying 120/61   Pulse Lying 67 PER MINUTE   Blood Pressure Sitting 123/62   Pulse Sitting 68 PER MINUTE   Blood Pressure Standing 120/67   Pulse Standing 67 PER MINUTE      Functional Outcome Measure:  The patient scored 18/24 on the AM-PAC outcome measure.         PLAN :  Recommend with staff: Amb to bathroom with Supervision, daily ADLs with set up A and Supervision.    Recommendation for discharge: (in order for the patient to meet his/her long term goals):   No skilled occupational therapy    Other factors to consider for discharge: no additional factors    IF patient discharges home will need the following DME: patient owns DME required for discharge     SUBJECTIVE:   Patient stated: Pt is non-verbal at baseline. Per chart review, pt communicated via gestures and head nods. Pt nodding head throughout session in response to questions, however, with inconsistent accuracy (pt nods yes to all questions).    OBJECTIVE DATA

## 2025-07-25 ENCOUNTER — APPOINTMENT (OUTPATIENT)
Facility: HOSPITAL | Age: 70
DRG: 149 | End: 2025-07-25
Payer: MEDICARE

## 2025-07-25 LAB
ALBUMIN SERPL-MCNC: 2.8 G/DL (ref 3.5–5)
ALBUMIN/GLOB SERPL: 0.9 (ref 1.1–2.2)
ALP SERPL-CCNC: 100 U/L (ref 45–117)
ALT SERPL-CCNC: 24 U/L (ref 12–78)
ANION GAP SERPL CALC-SCNC: 7 MMOL/L (ref 2–12)
AST SERPL-CCNC: 19 U/L (ref 15–37)
BASOPHILS # BLD: 0.05 K/UL (ref 0–0.1)
BASOPHILS NFR BLD: 0.7 % (ref 0–1)
BILIRUB SERPL-MCNC: 0.1 MG/DL (ref 0.2–1)
BUN SERPL-MCNC: 14 MG/DL (ref 6–20)
BUN/CREAT SERPL: 14 (ref 12–20)
CALCIUM SERPL-MCNC: 8.6 MG/DL (ref 8.5–10.1)
CHLORIDE SERPL-SCNC: 108 MMOL/L (ref 97–108)
CO2 SERPL-SCNC: 27 MMOL/L (ref 21–32)
CREAT SERPL-MCNC: 1.03 MG/DL (ref 0.7–1.3)
DIFFERENTIAL METHOD BLD: ABNORMAL
EOSINOPHIL # BLD: 0.13 K/UL (ref 0–0.4)
EOSINOPHIL NFR BLD: 1.8 % (ref 0–7)
ERYTHROCYTE [DISTWIDTH] IN BLOOD BY AUTOMATED COUNT: 11.9 % (ref 11.5–14.5)
GLOBULIN SER CALC-MCNC: 3.1 G/DL (ref 2–4)
GLUCOSE SERPL-MCNC: 89 MG/DL (ref 65–100)
HCT VFR BLD AUTO: 34.2 % (ref 36.6–50.3)
HGB BLD-MCNC: 11.5 G/DL (ref 12.1–17)
IMM GRANULOCYTES # BLD AUTO: 0.03 K/UL (ref 0–0.04)
IMM GRANULOCYTES NFR BLD AUTO: 0.4 % (ref 0–0.5)
LYMPHOCYTES # BLD: 1.71 K/UL (ref 0.8–3.5)
LYMPHOCYTES NFR BLD: 24.3 % (ref 12–49)
MCH RBC QN AUTO: 31.9 PG (ref 26–34)
MCHC RBC AUTO-ENTMCNC: 33.6 G/DL (ref 30–36.5)
MCV RBC AUTO: 94.7 FL (ref 80–99)
MONOCYTES # BLD: 0.56 K/UL (ref 0–1)
MONOCYTES NFR BLD: 8 % (ref 5–13)
NEUTS SEG # BLD: 4.55 K/UL (ref 1.8–8)
NEUTS SEG NFR BLD: 64.8 % (ref 32–75)
NRBC # BLD: 0 K/UL (ref 0–0.01)
NRBC BLD-RTO: 0 PER 100 WBC
PLATELET # BLD AUTO: 244 K/UL (ref 150–400)
PMV BLD AUTO: 10.1 FL (ref 8.9–12.9)
POTASSIUM SERPL-SCNC: 3.8 MMOL/L (ref 3.5–5.1)
PROT SERPL-MCNC: 5.9 G/DL (ref 6.4–8.2)
RBC # BLD AUTO: 3.61 M/UL (ref 4.1–5.7)
SODIUM SERPL-SCNC: 142 MMOL/L (ref 136–145)
WBC # BLD AUTO: 7 K/UL (ref 4.1–11.1)

## 2025-07-25 PROCEDURE — 6370000000 HC RX 637 (ALT 250 FOR IP): Performed by: INTERNAL MEDICINE

## 2025-07-25 PROCEDURE — 99232 SBSQ HOSP IP/OBS MODERATE 35: CPT | Performed by: NURSE PRACTITIONER

## 2025-07-25 PROCEDURE — 2060000000 HC ICU INTERMEDIATE R&B

## 2025-07-25 PROCEDURE — 94760 N-INVAS EAR/PLS OXIMETRY 1: CPT

## 2025-07-25 PROCEDURE — 6370000000 HC RX 637 (ALT 250 FOR IP): Performed by: NURSE PRACTITIONER

## 2025-07-25 PROCEDURE — 74018 RADEX ABDOMEN 1 VIEW: CPT

## 2025-07-25 PROCEDURE — 2500000003 HC RX 250 WO HCPCS: Performed by: INTERNAL MEDICINE

## 2025-07-25 PROCEDURE — 80053 COMPREHEN METABOLIC PANEL: CPT

## 2025-07-25 PROCEDURE — 70551 MRI BRAIN STEM W/O DYE: CPT

## 2025-07-25 PROCEDURE — 6360000002 HC RX W HCPCS: Performed by: INTERNAL MEDICINE

## 2025-07-25 PROCEDURE — 85025 COMPLETE CBC W/AUTO DIFF WBC: CPT

## 2025-07-25 RX ORDER — ATORVASTATIN CALCIUM 10 MG/1
10 TABLET, FILM COATED ORAL NIGHTLY
Status: DISCONTINUED | OUTPATIENT
Start: 2025-07-25 | End: 2025-07-26 | Stop reason: HOSPADM

## 2025-07-25 RX ADMIN — PANTOPRAZOLE SODIUM 40 MG: 40 TABLET, DELAYED RELEASE ORAL at 06:45

## 2025-07-25 RX ADMIN — ENOXAPARIN SODIUM 40 MG: 100 INJECTION SUBCUTANEOUS at 08:15

## 2025-07-25 RX ADMIN — LEVETIRACETAM 500 MG: 500 TABLET, FILM COATED ORAL at 22:02

## 2025-07-25 RX ADMIN — KETOTIFEN FUMARATE 1 DROP: 0.25 SOLUTION/ DROPS OPHTHALMIC at 08:17

## 2025-07-25 RX ADMIN — MONTELUKAST 10 MG: 10 TABLET, FILM COATED ORAL at 22:02

## 2025-07-25 RX ADMIN — FLUTICASONE PROPIONATE 2 SPRAY: 50 SPRAY, METERED NASAL at 08:17

## 2025-07-25 RX ADMIN — FLUOXETINE HYDROCHLORIDE 20 MG: 20 CAPSULE ORAL at 08:15

## 2025-07-25 RX ADMIN — CETIRIZINE HYDROCHLORIDE 10 MG: 10 TABLET, FILM COATED ORAL at 08:15

## 2025-07-25 RX ADMIN — DOCUSATE SODIUM 100 MG: 100 CAPSULE, LIQUID FILLED ORAL at 08:15

## 2025-07-25 RX ADMIN — ASPIRIN 81 MG: 81 TABLET, CHEWABLE ORAL at 08:15

## 2025-07-25 RX ADMIN — LEVETIRACETAM 500 MG: 500 TABLET, FILM COATED ORAL at 08:15

## 2025-07-25 RX ADMIN — BUPROPION HYDROCHLORIDE 300 MG: 300 TABLET, EXTENDED RELEASE ORAL at 08:15

## 2025-07-25 RX ADMIN — SODIUM CHLORIDE, PRESERVATIVE FREE 10 ML: 5 INJECTION INTRAVENOUS at 08:17

## 2025-07-25 RX ADMIN — Medication 400 MG: at 08:15

## 2025-07-25 RX ADMIN — ATORVASTATIN CALCIUM 10 MG: 10 TABLET, FILM COATED ORAL at 22:02

## 2025-07-25 RX ADMIN — FINASTERIDE 5 MG: 5 TABLET, FILM COATED ORAL at 08:15

## 2025-07-25 RX ADMIN — THERA TABS 1 TABLET: TAB at 08:15

## 2025-07-25 NOTE — PLAN OF CARE
Problem: Safety - Adult  Goal: Free from fall injury  7/25/2025 0950 by Wendy Laguerre RN  Outcome: Progressing  Flowsheets (Taken 7/25/2025 0800)  Free From Fall Injury:   Instruct family/caregiver on patient safety   Based on caregiver fall risk screen, instruct family/caregiver to ask for assistance with transferring infant if caregiver noted to have fall risk factors  7/25/2025 0106 by Driss oDan, RN  Outcome: Progressing     Problem: Discharge Planning  Goal: Discharge to home or other facility with appropriate resources  7/25/2025 0950 by Wendy Laguerre RN  Outcome: Progressing  Flowsheets (Taken 7/25/2025 0800)  Discharge to home or other facility with appropriate resources:   Identify barriers to discharge with patient and caregiver   Arrange for needed discharge resources and transportation as appropriate   Identify discharge learning needs (meds, wound care, etc)  7/25/2025 0106 by Driss Doan, RN  Outcome: Progressing     Problem: Pain  Goal: Verbalizes/displays adequate comfort level or baseline comfort level  7/25/2025 0950 by Wendy Laguerre RN  Outcome: Progressing  7/25/2025 0106 by Driss Doan, RN  Outcome: Progressing     Problem: Skin/Tissue Integrity  Goal: Skin integrity remains intact  Description: 1.  Monitor for areas of redness and/or skin breakdown  2.  Assess vascular access sites hourly  3.  Every 4-6 hours minimum:  Change oxygen saturation probe site  4.  Every 4-6 hours:  If on nasal continuous positive airway pressure, respiratory therapy assess nares and determine need for appliance change or resting period  7/25/2025 0950 by Wendy Laguerre RN  Outcome: Progressing  Flowsheets (Taken 7/25/2025 0800)  Skin Integrity Remains Intact:   Monitor for areas of redness and/or skin breakdown   Assess vascular access sites hourly   Every 4-6 hours:  If on nasal continuous positive airway pressure, assess nares and determine need for appliance change or resting

## 2025-07-25 NOTE — PROGRESS NOTES
Hospitalist Progress Note  SABRINA Curry NP          Date of Service:  2025  NAME:  Jules Bowie  :  1955  MRN:  [id number]    Admission Summary:      Mr. Bowie is a 70 y.o. male who presented to the ED with another episode of vertigo.  Patient stays in a group home setting due to mental health issues.  Per outside report,  patient has been having intermittent episodes of unsteadiness dating back several years.  Recently, he called the facility nurse and reported symptoms.  On evaluation, patient was found to have a low blood pressure reading.  He was  sent to the ED through EMS.  No witnessed seizures.  At baseline he walks though unsteady at times.  No recent falls.       Review of the emergency room records suggest patient came with unsteady gait, left-sided facial droop and not following commands.  Patient was not following verbal commands per EMS statement.  EMS found BP 91/55.  He received a 200 mL of normal saline during transport.  In the ED, glucose was 148, and was evaluated by teleneurology.  Blood pressure was 115/57 at the time of evaluation.  NIHSS was 22.  CT did not reveal any acute process.  CT angiogram was negative for any flow-limiting stenosis.  He was not a TNK candidate or mechanical thrombectomy candidate.  A EEG study was scheduled and started in the ED      Interval history / Subjective:        Patient was seen on rounds this morning, he was lying in bed in no acute distress.  Patient is nonverbal but nods his heads to questioning.  Unable to obtain an accurate review of systems but he denies any pain.  Nursing reports no acute events overnight.  Plans are for MRI today.    Assessment & Plan:     Vertigo  Stroke like Symptoms  - CT Head: No acute process.   - CTA/P:  No acute large vessel occlusion, arterial dissection, or hemodynamically  significant stenosis. No perfusion abnormality.

## 2025-07-25 NOTE — PROCEDURES
Patient Name  MRN Ordering Physician Primary Indication   EDDY TOBIAS 1955 [id number] [facility] Ischemic Stroke   Site Location   [facility] ED     Recording Information:  Start Time: 2025 11:50 AM End Time: 2025 17:29 PM Duration: 05:39:15 (339 minutes)   Recording Technique: This EEG was obtained using a 10 lead, 8 channel circumferential rapid EEG with no parasagittal coverage. Performed with Brenda Morton Status Epilepticus Monitor, ICD-10 [id number]     Clinical History  EDDY TOBIAS is a 70 year old Ischemic Stroke patient undergoing EEG to screen for non-convulsive status epilepticus.     Description: Background activity consists of well-modulated rhythms in the early part of this recording without any asymmetry of sharp wave activity.  Recording was poor due to high impedance on the right side for most part of the study.      Impression: Technically very limited study with readable portions appearing normal.  No clearly epileptiform disturbance or electrographic seizures seen    Comment: Consider routine 18 channel EEG for further clarification as it provides better spatial resolution parasagittal coverage.

## 2025-07-25 NOTE — PROGRESS NOTES
NEUROLOGY PROGRESS NOTE    Name Jules Bowie Age 70 y.o.   MRN [id number]  1955     Consulting Physician: Darin Darnell MD      Chief Complaint:  L facial droop, unsteady gait     Assessment:     Principal Problem:    Vertigo due to cerebrovascular disease  Active Problems:    Unsteadiness on feet    Stroke-like symptoms    Other specified symptoms and signs involving the circulatory and respiratory systems    Stroke-like symptom  Resolved Problems:    * No resolved hospital problems. *    Patient is a 70 year-old male with history of seizures, vertigo, autism and MR who presented today from his group home/day activity with concern for L facial droop and unsteadiness. Patient is nonverbal and history provided is minimal. There is no facial droop currently or focal weakness; any weakness noted is symmetric. NIHSS 22 (much of scoring is patient's baseline). CT head negative. CTA no significant stenosis or occlusion. 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 Exam difficult to obtain given baseline functioning.     25:  Exam improved to baseline and confirmed by Maria Luisa from facility at bedside. MRI brain still pending. LDL 36.8, A1c 5.3. TTE EF 55%, LA normal. Less concern for seizure with presentation.     25:  MRI brain negative for acute infarct with frontal lobe encephalomalacia and mild CIWM.       Possible stroke/TIA versus nonspecific symptoms  History of seizure  Recommendations:   - Discontinue aspirin  - Can change back to home dose statin   - Goal normotension/normoglycemia   - PT/OT evaluated and no recommendations  - Continue Keppra 500 mg BID  - Discussed incidental aneurysm finding with Lianne Hidalgo NP and she states he can f/u in the [facility] for further evaluation and management given low risk of rupture. Please schedule at discharge.     No further recommendations. Please contact with any questions.   History of Present Illness:      This is a 70

## 2025-07-26 VITALS
SYSTOLIC BLOOD PRESSURE: 109 MMHG | HEART RATE: 74 BPM | WEIGHT: 130.3 LBS | OXYGEN SATURATION: 94 % | RESPIRATION RATE: 19 BRPM | DIASTOLIC BLOOD PRESSURE: 63 MMHG | HEIGHT: 65 IN | TEMPERATURE: 98.8 F | BODY MASS INDEX: 21.71 KG/M2

## 2025-07-26 PROBLEM — I67.9 VERTIGO DUE TO CEREBROVASCULAR DISEASE: Status: RESOLVED | Noted: 2025-07-23 | Resolved: 2025-07-26

## 2025-07-26 PROBLEM — R42 VERTIGO DUE TO CEREBROVASCULAR DISEASE: Status: RESOLVED | Noted: 2025-07-23 | Resolved: 2025-07-26

## 2025-07-26 PROBLEM — R26.81 UNSTEADINESS ON FEET: Status: RESOLVED | Noted: 2019-10-25 | Resolved: 2025-07-26

## 2025-07-26 PROBLEM — R29.90 STROKE-LIKE SYMPTOMS: Status: RESOLVED | Noted: 2025-07-24 | Resolved: 2025-07-26

## 2025-07-26 PROBLEM — R29.90 STROKE-LIKE SYMPTOM: Status: RESOLVED | Noted: 2025-07-24 | Resolved: 2025-07-26

## 2025-07-26 LAB
EKG ATRIAL RATE: 68 BPM
EKG DIAGNOSIS: NORMAL
EKG P AXIS: 79 DEGREES
EKG P-R INTERVAL: 158 MS
EKG Q-T INTERVAL: 418 MS
EKG QRS DURATION: 112 MS
EKG QTC CALCULATION (BAZETT): 444 MS
EKG R AXIS: 75 DEGREES
EKG T AXIS: 63 DEGREES
EKG VENTRICULAR RATE: 68 BPM

## 2025-07-26 PROCEDURE — 93010 ELECTROCARDIOGRAM REPORT: CPT | Performed by: SPECIALIST

## 2025-07-26 PROCEDURE — 2500000003 HC RX 250 WO HCPCS: Performed by: INTERNAL MEDICINE

## 2025-07-26 PROCEDURE — 6370000000 HC RX 637 (ALT 250 FOR IP): Performed by: INTERNAL MEDICINE

## 2025-07-26 PROCEDURE — 6360000002 HC RX W HCPCS: Performed by: INTERNAL MEDICINE

## 2025-07-26 RX ADMIN — ENOXAPARIN SODIUM 40 MG: 100 INJECTION SUBCUTANEOUS at 09:03

## 2025-07-26 RX ADMIN — FINASTERIDE 5 MG: 5 TABLET, FILM COATED ORAL at 09:01

## 2025-07-26 RX ADMIN — FLUOXETINE HYDROCHLORIDE 20 MG: 20 CAPSULE ORAL at 09:01

## 2025-07-26 RX ADMIN — Medication 400 MG: at 09:01

## 2025-07-26 RX ADMIN — SODIUM CHLORIDE, PRESERVATIVE FREE 10 ML: 5 INJECTION INTRAVENOUS at 09:03

## 2025-07-26 RX ADMIN — LEVETIRACETAM 500 MG: 500 TABLET, FILM COATED ORAL at 09:01

## 2025-07-26 RX ADMIN — DOCUSATE SODIUM 100 MG: 100 CAPSULE, LIQUID FILLED ORAL at 09:01

## 2025-07-26 RX ADMIN — THERA TABS 1 TABLET: TAB at 09:01

## 2025-07-26 RX ADMIN — BUPROPION HYDROCHLORIDE 300 MG: 300 TABLET, EXTENDED RELEASE ORAL at 09:01

## 2025-07-26 RX ADMIN — FLUTICASONE PROPIONATE 2 SPRAY: 50 SPRAY, METERED NASAL at 09:02

## 2025-07-26 RX ADMIN — PANTOPRAZOLE SODIUM 40 MG: 40 TABLET, DELAYED RELEASE ORAL at 07:10

## 2025-07-26 RX ADMIN — KETOTIFEN FUMARATE 1 DROP: 0.25 SOLUTION/ DROPS OPHTHALMIC at 09:02

## 2025-07-26 RX ADMIN — CETIRIZINE HYDROCHLORIDE 10 MG: 10 TABLET, FILM COATED ORAL at 09:01

## 2025-07-26 NOTE — PLAN OF CARE
Problem: Safety - Adult  Goal: Free from fall injury  7/26/2025 1333 by Smitha Zuluaga RN  Outcome: Completed  7/26/2025 1139 by Smitha Zuluaga RN  Outcome: Progressing     Problem: Discharge Planning  Goal: Discharge to home or other facility with appropriate resources  7/26/2025 1333 by Smitha Zuluaga RN  Outcome: Completed  7/26/2025 1139 by Smitha Zuluaga RN  Outcome: Progressing     Problem: Pain  Goal: Verbalizes/displays adequate comfort level or baseline comfort level  7/26/2025 1333 by Smitha Zuluaga RN  Outcome: Completed  7/26/2025 1139 by Smitha Zuluaga RN  Outcome: Progressing     Problem: Skin/Tissue Integrity  Goal: Skin integrity remains intact  Description: 1.  Monitor for areas of redness and/or skin breakdown  2.  Assess vascular access sites hourly  3.  Every 4-6 hours minimum:  Change oxygen saturation probe site  4.  Every 4-6 hours:  If on nasal continuous positive airway pressure, respiratory therapy assess nares and determine need for appliance change or resting period  7/26/2025 1333 by Smitha Zuluaga RN  Outcome: Completed  7/26/2025 1139 by Smitha Zuluaga RN  Outcome: Progressing  Flowsheets (Taken 7/26/2025 0800)  Skin Integrity Remains Intact: Monitor for areas of redness and/or skin breakdown     Problem: Neurosensory - Adult  Goal: Achieves stable or improved neurological status  7/26/2025 1333 by Smitha Zuluaga RN  Outcome: Completed  7/26/2025 1139 by Smitha Zuluaga RN  Outcome: Progressing  Goal: Absence of seizures  7/26/2025 1333 by Smitha Zuluaga RN  Outcome: Completed  7/26/2025 1139 by Smitha Zuluaga RN  Outcome: Progressing  Goal: Achieves maximal functionality and self care  7/26/2025 1333 by Smitha Zuluaga RN  Outcome: Completed  7/26/2025 1139 by Smitha Zuulaga RN  Outcome: Progressing     Problem: Musculoskeletal - Adult  Goal: Return mobility to safest level of function  7/26/2025 1333 by Smitha Zuluaga RN  Outcome: Completed  7/26/2025 1139 by Smitha Zuluaga RN  Outcome:  Progressing     Problem: Metabolic/Fluid and Electrolytes - Adult  Goal: Electrolytes maintained within normal limits  7/26/2025 1333 by Smitha Zuluaga RN  Outcome: Completed  7/26/2025 1139 by Smitha Zuluaga RN  Outcome: Progressing  Goal: Hemodynamic stability and optimal renal function maintained  7/26/2025 1333 by Smitha Zuluaga RN  Outcome: Completed  7/26/2025 1139 by Smitha Zuluaga RN  Outcome: Progressing

## 2025-07-26 NOTE — DISCHARGE INSTRUCTIONS
Discharge Summary      PATIENT ID: Jules Bowie  MRN: [id number]   YOB: 1955    DATE OF ADMISSION: 7/23/2025 10:31 AM    DATE OF DISCHARGE: 7/26/2025   PRIMARY CARE PROVIDER: Mustapha Estrada MD   ATTENDING PHYSICIAN: Darin Darnell MD  DISCHARGING PROVIDER: SABRINA Curry NP       CONSULTATIONS: IP CONSULT TO TELE-NEUROLOGY  IP CONSULT TO NEUROLOGY  IP CONSULT TO CASE MANAGEMENT  IP CONSULT TO PHARMACY     PROCEDURES/SURGERIES: * No surgery found *      ADMITTING DIAGNOSES & HOSPITAL COURSE:      Mr. Bowie is a 70 y.o. male who presented to the ED with another episode of vertigo.  Patient stays in a group home setting due to mental health issues.  Per outside report,  patient has been having intermittent episodes of unsteadiness dating back several years.  Recently, he called the facility nurse and reported symptoms.  On evaluation, patient was found to have a low blood pressure reading.  He was  sent to the ED through EMS.  No witnessed seizures.  At baseline he walks though unsteady at times.  No recent falls.       Review of the emergency room records suggest patient came with unsteady gait, left-sided facial droop and not following commands.  Patient was not following verbal commands per EMS statement.  EMS found BP 91/55.  He received a 200 mL of normal saline during transport.  In the ED, glucose was 148, and was evaluated by teleneurology.  Blood pressure was 115/57 at the time of evaluation.  NIHSS was 22.  CT did not reveal any acute process.  CT angiogram was negative for any flow-limiting stenosis.  He was not a TNK candidate or mechanical thrombectomy candidate.  A EEG study was scheduled and started in the ED      DISCHARGE DIAGNOSES / PLAN:       Vertigo  Stroke like Symptoms  - CT Head: No acute process.   - CTA/P:  No acute large vessel occlusion, arterial dissection, or hemodynamically  significant stenosis. No perfusion abnormality. 3 mm left carotid cave aneurysm. Small

## 2025-07-26 NOTE — DISCHARGE SUMMARY
Discharge Summary     PATIENT ID: Jules Bowie  MRN: [id number]   YOB: 1955    DATE OF ADMISSION: 7/23/2025 10:31 AM    DATE OF DISCHARGE: 7/26/2025   PRIMARY CARE PROVIDER: Mustapha Estrada MD   ATTENDING PHYSICIAN: Darin Darnell MD  DISCHARGING PROVIDER: SABRINA Curry NP      CONSULTATIONS: IP CONSULT TO TELE-NEUROLOGY  IP CONSULT TO NEUROLOGY  IP CONSULT TO CASE MANAGEMENT  IP CONSULT TO PHARMACY    PROCEDURES/SURGERIES: * No surgery found *     ADMITTING DIAGNOSES & HOSPITAL COURSE:     Mr. Bowie is a 70 y.o. male who presented to the ED with another episode of vertigo.  Patient stays in a group home setting due to mental health issues.  Per outside report,  patient has been having intermittent episodes of unsteadiness dating back several years.  Recently, he called the facility nurse and reported symptoms.  On evaluation, patient was found to have a low blood pressure reading.  He was  sent to the ED through EMS.  No witnessed seizures.  At baseline he walks though unsteady at times.  No recent falls.       Review of the emergency room records suggest patient came with unsteady gait, left-sided facial droop and not following commands.  Patient was not following verbal commands per EMS statement.  EMS found BP 91/55.  He received a 200 mL of normal saline during transport.  In the ED, glucose was 148, and was evaluated by teleneurology.  Blood pressure was 115/57 at the time of evaluation.  NIHSS was 22.  CT did not reveal any acute process.  CT angiogram was negative for any flow-limiting stenosis.  He was not a TNK candidate or mechanical thrombectomy candidate.  A EEG study was scheduled and started in the ED     DISCHARGE DIAGNOSES / PLAN:      Vertigo  Stroke like Symptoms  - CT Head: No acute process.   - CTA/P:  No acute large vessel occlusion, arterial dissection, or hemodynamically  significant stenosis. No perfusion abnormality. 3 mm left carotid cave aneurysm. Small amount

## 2025-07-26 NOTE — PLAN OF CARE
Problem: Safety - Adult  Goal: Free from fall injury  Outcome: Progressing     Problem: Discharge Planning  Goal: Discharge to home or other facility with appropriate resources  Outcome: Progressing     Problem: Pain  Goal: Verbalizes/displays adequate comfort level or baseline comfort level  Outcome: Progressing     Problem: Skin/Tissue Integrity  Goal: Skin integrity remains intact  Description: 1.  Monitor for areas of redness and/or skin breakdown  2.  Assess vascular access sites hourly  3.  Every 4-6 hours minimum:  Change oxygen saturation probe site  4.  Every 4-6 hours:  If on nasal continuous positive airway pressure, respiratory therapy assess nares and determine need for appliance change or resting period  Outcome: Progressing     Problem: Neurosensory - Adult  Goal: Achieves stable or improved neurological status  Outcome: Progressing  Goal: Absence of seizures  Outcome: Progressing  Goal: Achieves maximal functionality and self care  Outcome: Progressing     Problem: Musculoskeletal - Adult  Goal: Return mobility to safest level of function  Outcome: Progressing     Problem: Metabolic/Fluid and Electrolytes - Adult  Goal: Electrolytes maintained within normal limits  Outcome: Progressing  Goal: Hemodynamic stability and optimal renal function maintained  Outcome: Progressing

## 2025-08-15 ENCOUNTER — TELEMEDICINE (OUTPATIENT)
Age: 70
End: 2025-08-15
Payer: MEDICARE

## 2025-08-15 ENCOUNTER — CLINICAL DOCUMENTATION (OUTPATIENT)
Age: 70
End: 2025-08-15

## 2025-08-15 DIAGNOSIS — Z09 HOSPITAL DISCHARGE FOLLOW-UP: ICD-10-CM

## 2025-08-15 DIAGNOSIS — R42 VERTIGO: Primary | ICD-10-CM

## 2025-08-15 PROCEDURE — 1111F DSCHRG MED/CURRENT MED MERGE: CPT | Performed by: STUDENT IN AN ORGANIZED HEALTH CARE EDUCATION/TRAINING PROGRAM

## 2025-08-15 PROCEDURE — 1159F MED LIST DOCD IN RCRD: CPT | Performed by: STUDENT IN AN ORGANIZED HEALTH CARE EDUCATION/TRAINING PROGRAM

## 2025-08-15 PROCEDURE — G8427 DOCREV CUR MEDS BY ELIG CLIN: HCPCS | Performed by: STUDENT IN AN ORGANIZED HEALTH CARE EDUCATION/TRAINING PROGRAM

## 2025-08-15 PROCEDURE — 1123F ACP DISCUSS/DSCN MKR DOCD: CPT | Performed by: STUDENT IN AN ORGANIZED HEALTH CARE EDUCATION/TRAINING PROGRAM

## 2025-08-15 PROCEDURE — 3017F COLORECTAL CA SCREEN DOC REV: CPT | Performed by: STUDENT IN AN ORGANIZED HEALTH CARE EDUCATION/TRAINING PROGRAM

## 2025-08-15 PROCEDURE — 99213 OFFICE O/P EST LOW 20 MIN: CPT | Performed by: STUDENT IN AN ORGANIZED HEALTH CARE EDUCATION/TRAINING PROGRAM

## (undated) DEVICE — WIPE 400300 MEROCEL 20PK INSTRUMENT: Brand: MEROCEL®

## (undated) DEVICE — SUTURE CHROMIC GUT SZ 4-0 L18IN ABSRB BRN L13MM P-3 3/8 CIR 1654G

## (undated) DEVICE — SURGICAL PROCEDURE PACK BASIN MAJ SET CUST NO CAUT

## (undated) DEVICE — BLADE TYMPLSTY W2.5MM 60DEG SHRP ALL ARND BVL DN

## (undated) DEVICE — STERILE COTTON BALLS LARGE 5/P: Brand: MEDLINE

## (undated) DEVICE — SYR LR LCK 1ML GRAD NSAF 30ML --

## (undated) DEVICE — GOWN,SIRUS,NONRNF,SETINSLV,XL,20/CS: Brand: MEDLINE

## (undated) DEVICE — 1010 S-DRAPE TOWEL DRAPE 10/BX: Brand: STERI-DRAPE™

## (undated) DEVICE — SURGIFOAM SPNG SZ 12-7

## (undated) DEVICE — TRAY PREP DRY W/ PREM GLV 2 APPL 6 SPNG 2 UNDPD 1 OVERWRAP

## (undated) DEVICE — SYR 10ML CTRL LR LCK NSAF LF --

## (undated) DEVICE — SUTURE PLN GUT SZ 5-0 L18IN ABSRB YELLOWISH TAN L13MM PC-1 1915G

## (undated) DEVICE — PACK,EENT,TURBAN DRAPE,PK II: Brand: MEDLINE

## (undated) DEVICE — INFECTION CONTROL KIT SYS

## (undated) DEVICE — STRAP,POSITIONING,KNEE/BODY,FOAM,4X60": Brand: MEDLINE

## (undated) DEVICE — 40418 TRENDELENBURG ONE-STEP ARM PROTECTORS LARGE (1 PAIR): Brand: 40418 TRENDELENBURG ONE-STEP ARM PROTECTORS LARGE (1 PAIR)

## (undated) DEVICE — SOLUTION IV 1000ML 0.9% SOD CHL

## (undated) DEVICE — STERILE POLYISOPRENE POWDER-FREE SURGICAL GLOVES WITH EMOLLIENT COATING: Brand: PROTEXIS

## (undated) DEVICE — TUBING IRRIG COMPATIBLE W ERBE MEDIVATOR PMP HYDR

## (undated) DEVICE — HANDLE LT SNAP ON ULT DURABLE LENS FOR TRUMPF ALC DISPOSABLE

## (undated) DEVICE — DRAPE MICSCP W46XL120IN FOR ZEISS MD

## (undated) DEVICE — COVER,MAYO STAND,STERILE: Brand: MEDLINE

## (undated) DEVICE — TUBING SUCT MASTOID TWO IRRIG SPIK ALL IN 1 LTWT FLX LEVIN

## (undated) DEVICE — GARMENT,MEDLINE,DVT,INT,CALF,MED, GEN2: Brand: MEDLINE

## (undated) DEVICE — FORCEPS BX L240CM JAW DIA2.8MM L CAP W/ NDL MIC MESH TOOTH

## (undated) DEVICE — SYRINGE,EAR/ULCER, 2 OZ, STERILE: Brand: MEDLINE

## (undated) DEVICE — SNARE VASC L240CM LOOP W10MM SHTH DIA2.4MM RND STIFF CLD

## (undated) DEVICE — SOLUTION IRRIG 1000ML H2O STRL BLT

## (undated) DEVICE — BIPOLAR FORCEPS CORD: Brand: VALLEYLAB

## (undated) DEVICE — ELECTRODE 8227410 PAIRED 2 CH SET ROHS

## (undated) DEVICE — MASTISOL ADHESIVE LIQ 2/3ML

## (undated) DEVICE — NEEDLE HYPO 25GA L1.5IN BLU POLYPR HUB S STL REG BVL STR

## (undated) DEVICE — SUTURE VCRL SZ 3-0 L18IN ABSRB UD PS-2 L19MM 1/2 CIR J497G

## (undated) DEVICE — BLADE OPHTH MINI BEAV SHRP --

## (undated) DEVICE — SYR 5ML 1/5 GRAD LL NSAF LF --